# Patient Record
Sex: FEMALE | Race: WHITE | NOT HISPANIC OR LATINO
[De-identification: names, ages, dates, MRNs, and addresses within clinical notes are randomized per-mention and may not be internally consistent; named-entity substitution may affect disease eponyms.]

---

## 2017-09-12 ENCOUNTER — LABORATORY RESULT (OUTPATIENT)
Age: 82
End: 2017-09-12

## 2017-09-12 ENCOUNTER — NON-APPOINTMENT (OUTPATIENT)
Age: 82
End: 2017-09-12

## 2017-09-12 ENCOUNTER — APPOINTMENT (OUTPATIENT)
Dept: INTERNAL MEDICINE | Facility: CLINIC | Age: 82
End: 2017-09-12
Payer: MEDICARE

## 2017-09-12 VITALS — DIASTOLIC BLOOD PRESSURE: 66 MMHG | SYSTOLIC BLOOD PRESSURE: 156 MMHG

## 2017-09-12 VITALS — SYSTOLIC BLOOD PRESSURE: 142 MMHG | DIASTOLIC BLOOD PRESSURE: 72 MMHG

## 2017-09-12 DIAGNOSIS — Z00.00 ENCOUNTER FOR GENERAL ADULT MEDICAL EXAMINATION W/OUT ABNORMAL FINDINGS: ICD-10-CM

## 2017-09-12 DIAGNOSIS — F03.90 UNSPECIFIED DEMENTIA W/OUT BEHAVIORAL DISTURBANCE: ICD-10-CM

## 2017-09-12 DIAGNOSIS — Z82.0 FAMILY HISTORY OF EPILEPSY AND OTHER DISEASES OF THE NERVOUS SYSTEM: ICD-10-CM

## 2017-09-12 DIAGNOSIS — M48.06 SPINAL STENOSIS, LUMBAR REGION: ICD-10-CM

## 2017-09-12 DIAGNOSIS — Z82.49 FAMILY HISTORY OF ISCHEMIC HEART DISEASE AND OTHER DISEASES OF THE CIRCULATORY SYSTEM: ICD-10-CM

## 2017-09-12 DIAGNOSIS — I25.2 OLD MYOCARDIAL INFARCTION: ICD-10-CM

## 2017-09-12 DIAGNOSIS — Z87.898 PERSONAL HISTORY OF OTHER SPECIFIED CONDITIONS: ICD-10-CM

## 2017-09-12 LAB
BILIRUB UR QL STRIP: NORMAL
CLARITY UR: CLEAR
COLLECTION METHOD: NORMAL
GLUCOSE UR-MCNC: NORMAL
HCG UR QL: 0.2 EU/DL
HGB UR QL STRIP.AUTO: NORMAL
KETONES UR-MCNC: NORMAL
LEUKOCYTE ESTERASE UR QL STRIP: NORMAL
NITRITE UR QL STRIP: NORMAL
PH UR STRIP: 5.5
PROT UR STRIP-MCNC: NORMAL
SP GR UR STRIP: 1.01

## 2017-09-12 PROCEDURE — 81003 URINALYSIS AUTO W/O SCOPE: CPT | Mod: QW

## 2017-09-12 PROCEDURE — 90686 IIV4 VACC NO PRSV 0.5 ML IM: CPT

## 2017-09-12 PROCEDURE — 90670 PCV13 VACCINE IM: CPT | Mod: GA

## 2017-09-12 PROCEDURE — 36415 COLL VENOUS BLD VENIPUNCTURE: CPT

## 2017-09-12 PROCEDURE — G0009: CPT

## 2017-09-12 PROCEDURE — 93000 ELECTROCARDIOGRAM COMPLETE: CPT

## 2017-09-12 PROCEDURE — G0439: CPT

## 2017-09-12 PROCEDURE — G0438: CPT | Mod: GA

## 2017-09-12 PROCEDURE — 99214 OFFICE O/P EST MOD 30 MIN: CPT | Mod: 25

## 2017-09-12 PROCEDURE — G0008: CPT

## 2017-09-12 RX ORDER — MULTIVIT-MIN/FOLIC/VIT K/LYCOP 400-300MCG
25 MCG TABLET ORAL DAILY
Refills: 0 | Status: ACTIVE | COMMUNITY

## 2017-09-12 RX ORDER — INULIN 1.5 G
TABLET,CHEWABLE ORAL DAILY
Refills: 0 | Status: ACTIVE | COMMUNITY

## 2017-09-12 RX ORDER — ASPIRIN ENTERIC COATED TABLETS 81 MG 81 MG/1
81 TABLET, DELAYED RELEASE ORAL DAILY
Refills: 0 | Status: ACTIVE | COMMUNITY

## 2017-09-12 RX ORDER — FAMOTIDINE 10 MG/1
10 TABLET, FILM COATED ORAL DAILY
Refills: 0 | Status: DISCONTINUED | COMMUNITY
End: 2017-09-12

## 2017-09-12 RX ORDER — CETIRIZINE HYDROCHLORIDE 10 MG/1
10 CAPSULE, LIQUID FILLED ORAL DAILY
Refills: 0 | Status: ACTIVE | COMMUNITY

## 2017-09-12 RX ORDER — DOCUSATE SODIUM 100 MG/1
100 CAPSULE ORAL
Qty: 180 | Refills: 3 | Status: ACTIVE | COMMUNITY

## 2017-09-12 RX ORDER — ASCORBIC ACID 125 MG
TABLET,CHEWABLE ORAL DAILY
Refills: 0 | Status: ACTIVE | COMMUNITY

## 2017-09-13 LAB
25(OH)D3 SERPL-MCNC: 34.6 NG/ML
ALBUMIN SERPL ELPH-MCNC: 4.1 G/DL
ALP BLD-CCNC: 75 U/L
ALT SERPL-CCNC: 15 U/L
ANION GAP SERPL CALC-SCNC: 19 MMOL/L
AST SERPL-CCNC: 24 U/L
BASOPHILS # BLD AUTO: 0.02 K/UL
BASOPHILS NFR BLD AUTO: 0.2 %
BILIRUB SERPL-MCNC: 0.3 MG/DL
BUN SERPL-MCNC: 37 MG/DL
CALCIUM SERPL-MCNC: 11.4 MG/DL
CHLORIDE SERPL-SCNC: 99 MMOL/L
CHOLEST SERPL-MCNC: 172 MG/DL
CHOLEST/HDLC SERPL: 2.8 RATIO
CO2 SERPL-SCNC: 24 MMOL/L
CREAT SERPL-MCNC: 1.56 MG/DL
EOSINOPHIL # BLD AUTO: 0.27 K/UL
EOSINOPHIL NFR BLD AUTO: 3.1 %
GLUCOSE SERPL-MCNC: 85 MG/DL
HBA1C MFR BLD HPLC: 5.6 %
HCT VFR BLD CALC: 40 %
HDLC SERPL-MCNC: 62 MG/DL
HGB BLD-MCNC: 12.7 G/DL
IMM GRANULOCYTES NFR BLD AUTO: 0.1 %
LDLC SERPL CALC-MCNC: 72 MG/DL
LYMPHOCYTES # BLD AUTO: 1.65 K/UL
LYMPHOCYTES NFR BLD AUTO: 19.1 %
MAN DIFF?: NORMAL
MCHC RBC-ENTMCNC: 28.8 PG
MCHC RBC-ENTMCNC: 31.8 GM/DL
MCV RBC AUTO: 90.7 FL
MONOCYTES # BLD AUTO: 0.63 K/UL
MONOCYTES NFR BLD AUTO: 7.3 %
NEUTROPHILS # BLD AUTO: 6.05 K/UL
NEUTROPHILS NFR BLD AUTO: 70.2 %
PLATELET # BLD AUTO: 301 K/UL
POTASSIUM SERPL-SCNC: 4.9 MMOL/L
PROT SERPL-MCNC: 8 G/DL
RBC # BLD: 4.41 M/UL
RBC # FLD: 14.2 %
SAVE SPECIMEN: NORMAL
SODIUM SERPL-SCNC: 142 MMOL/L
T3RU NFR SERPL: 0.9 INDEX
TRIGL SERPL-MCNC: 190 MG/DL
TSH SERPL-ACNC: 3.83 UIU/ML
URATE SERPL-MCNC: 10.7 MG/DL
WBC # FLD AUTO: 8.63 K/UL

## 2017-09-19 DIAGNOSIS — Z63.4 DISAPPEARANCE AND DEATH OF FAMILY MEMBER: ICD-10-CM

## 2017-09-19 SDOH — SOCIAL STABILITY - SOCIAL INSECURITY: DISSAPEARANCE AND DEATH OF FAMILY MEMBER: Z63.4

## 2017-09-21 ENCOUNTER — RECORD ABSTRACTING (OUTPATIENT)
Age: 82
End: 2017-09-21

## 2017-09-25 ENCOUNTER — RECORD ABSTRACTING (OUTPATIENT)
Age: 82
End: 2017-09-25

## 2017-10-11 ENCOUNTER — MEDICATION RENEWAL (OUTPATIENT)
Age: 82
End: 2017-10-11

## 2017-10-12 ENCOUNTER — OTHER (OUTPATIENT)
Age: 82
End: 2017-10-12

## 2017-11-03 ENCOUNTER — MEDICATION RENEWAL (OUTPATIENT)
Age: 82
End: 2017-11-03

## 2017-12-06 ENCOUNTER — APPOINTMENT (OUTPATIENT)
Dept: INTERNAL MEDICINE | Facility: CLINIC | Age: 82
End: 2017-12-06
Payer: MEDICARE

## 2017-12-06 VITALS — SYSTOLIC BLOOD PRESSURE: 122 MMHG | DIASTOLIC BLOOD PRESSURE: 72 MMHG

## 2017-12-06 VITALS — DIASTOLIC BLOOD PRESSURE: 70 MMHG | SYSTOLIC BLOOD PRESSURE: 120 MMHG

## 2017-12-06 VITALS — HEIGHT: 62 IN | WEIGHT: 143 LBS | BODY MASS INDEX: 26.31 KG/M2

## 2017-12-06 PROCEDURE — 99214 OFFICE O/P EST MOD 30 MIN: CPT

## 2017-12-06 RX ORDER — IPRATROPIUM BROMIDE 0.5 MG/2.5ML
0.02 SOLUTION RESPIRATORY (INHALATION)
Refills: 0 | Status: ACTIVE | COMMUNITY

## 2017-12-06 RX ORDER — FLUOCINOLONE ACETONIDE 0.11 MG/ML
0.01 OIL AURICULAR (OTIC)
Refills: 0 | Status: ACTIVE | COMMUNITY

## 2017-12-21 ENCOUNTER — APPOINTMENT (OUTPATIENT)
Dept: INTERNAL MEDICINE | Facility: CLINIC | Age: 82
End: 2017-12-21

## 2018-01-11 ENCOUNTER — APPOINTMENT (OUTPATIENT)
Dept: INTERNAL MEDICINE | Facility: CLINIC | Age: 83
End: 2018-01-11

## 2018-01-29 ENCOUNTER — CHART COPY (OUTPATIENT)
Age: 83
End: 2018-01-29

## 2018-02-01 ENCOUNTER — APPOINTMENT (OUTPATIENT)
Dept: INTERNAL MEDICINE | Facility: CLINIC | Age: 83
End: 2018-02-01
Payer: MEDICARE

## 2018-02-01 DIAGNOSIS — Z00.00 ENCOUNTER FOR GENERAL ADULT MEDICAL EXAMINATION W/OUT ABNORMAL FINDINGS: ICD-10-CM

## 2018-02-01 PROCEDURE — 77080 DXA BONE DENSITY AXIAL: CPT

## 2018-04-10 ENCOUNTER — MEDICATION RENEWAL (OUTPATIENT)
Age: 83
End: 2018-04-10

## 2018-04-25 ENCOUNTER — MEDICATION RENEWAL (OUTPATIENT)
Age: 83
End: 2018-04-25

## 2018-05-22 ENCOUNTER — OTHER (OUTPATIENT)
Age: 83
End: 2018-05-22

## 2018-05-23 ENCOUNTER — RX RENEWAL (OUTPATIENT)
Age: 83
End: 2018-05-23

## 2018-05-30 ENCOUNTER — LABORATORY RESULT (OUTPATIENT)
Age: 83
End: 2018-05-30

## 2018-05-30 ENCOUNTER — APPOINTMENT (OUTPATIENT)
Dept: INTERNAL MEDICINE | Facility: CLINIC | Age: 83
End: 2018-05-30
Payer: MEDICARE

## 2018-05-30 VITALS — DIASTOLIC BLOOD PRESSURE: 72 MMHG | SYSTOLIC BLOOD PRESSURE: 112 MMHG

## 2018-05-30 VITALS — DIASTOLIC BLOOD PRESSURE: 72 MMHG | SYSTOLIC BLOOD PRESSURE: 106 MMHG

## 2018-05-30 DIAGNOSIS — E21.3 HYPERPARATHYROIDISM, UNSPECIFIED: ICD-10-CM

## 2018-05-30 PROCEDURE — 36415 COLL VENOUS BLD VENIPUNCTURE: CPT

## 2018-05-30 PROCEDURE — 99214 OFFICE O/P EST MOD 30 MIN: CPT | Mod: 25

## 2018-05-30 NOTE — PHYSICAL EXAM
[No Acute Distress] : no acute distress [Well Nourished] : well nourished [No Respiratory Distress] : no respiratory distress  [Clear to Auscultation] : lungs were clear to auscultation bilaterally [No Accessory Muscle Use] : no accessory muscle use [Regular Rhythm] : with a regular rhythm [Normal S1, S2] : normal S1 and S2 [No Murmur] : no murmur heard [No Edema] : there was no peripheral edema [No Joint Swelling] : no joint swelling [Grossly Normal Strength/Tone] : grossly normal strength/tone [No Focal Deficits] : no focal deficits [de-identified] : ELINA

## 2018-05-30 NOTE — ASSESSMENT
[FreeTextEntry1] : This is a 89-year-old female whose history has been reviewed above\par \par She has a history of ASHD she has little to no activity she has no chest pain or shortness of breath that can be ascertained her lungs are clear heart sounds are unchanged. She is on appropriate medication no intervention\par \par She has a history of dementia she remains on Aricept she does seem a bit more alert which I do believe it is from her hearing aids. No further intervention\par \par She is just hypertension blood pressure is under good control there is no orthostasis no medication changes\par \par She has a history of hyperparathyroidism she has been seen by endocrinology who declines intervention at this time a serum calcium has been drawn\par \par She does have some low back pain and this disease we will prescribe physical therapy

## 2018-05-30 NOTE — HISTORY OF PRESENT ILLNESS
[FreeTextEntry1] : This is a 89-year-old female for evaluation of treatment for hyperparathyroidism hypertension hypercholesterolemia ASHD mild dementia and spinal stenosis [de-identified] : Patient is accompanied by her daughter.\par \par She has received hearing aids and is much more attentive.\par \par She is having back pain which is not terribly debilitating. She continues to be relatively weak\par \par Her daughter states that she is incontinent but totally unaware\par \par The patient offers no complaints on her own

## 2018-05-31 LAB
25(OH)D3 SERPL-MCNC: 40.2 NG/ML
ALBUMIN SERPL ELPH-MCNC: 4.2 G/DL
ALP BLD-CCNC: 72 U/L
ALT SERPL-CCNC: 13 U/L
ANION GAP SERPL CALC-SCNC: 15 MMOL/L
AST SERPL-CCNC: 21 U/L
BASOPHILS # BLD AUTO: 0.02 K/UL
BASOPHILS NFR BLD AUTO: 0.2 %
BILIRUB SERPL-MCNC: 0.4 MG/DL
BUN SERPL-MCNC: 40 MG/DL
CALCIUM SERPL-MCNC: 11 MG/DL
CHLORIDE SERPL-SCNC: 105 MMOL/L
CHOLEST SERPL-MCNC: 172 MG/DL
CHOLEST/HDLC SERPL: 3.2 RATIO
CO2 SERPL-SCNC: 23 MMOL/L
CREAT SERPL-MCNC: 1.53 MG/DL
EOSINOPHIL # BLD AUTO: 0.2 K/UL
EOSINOPHIL NFR BLD AUTO: 2.4 %
GLUCOSE SERPL-MCNC: 96 MG/DL
HBA1C MFR BLD HPLC: 5.7 %
HCT VFR BLD CALC: 38.9 %
HDLC SERPL-MCNC: 54 MG/DL
HGB BLD-MCNC: 12 G/DL
IMM GRANULOCYTES NFR BLD AUTO: 0.1 %
LDLC SERPL CALC-MCNC: 84 MG/DL
LYMPHOCYTES # BLD AUTO: 1.25 K/UL
LYMPHOCYTES NFR BLD AUTO: 14.9 %
MAN DIFF?: NORMAL
MCHC RBC-ENTMCNC: 28.4 PG
MCHC RBC-ENTMCNC: 30.8 GM/DL
MCV RBC AUTO: 92.2 FL
MONOCYTES # BLD AUTO: 0.62 K/UL
MONOCYTES NFR BLD AUTO: 7.4 %
NEUTROPHILS # BLD AUTO: 6.31 K/UL
NEUTROPHILS NFR BLD AUTO: 75 %
PLATELET # BLD AUTO: 284 K/UL
POTASSIUM SERPL-SCNC: 4.2 MMOL/L
PROT SERPL-MCNC: 7.7 G/DL
RBC # BLD: 4.22 M/UL
RBC # FLD: 15.6 %
SAVE SPECIMEN: NORMAL
SODIUM SERPL-SCNC: 143 MMOL/L
T3RU NFR SERPL: 0.89 INDEX
T4 SERPL-MCNC: 6.1 UG/DL
TRIGL SERPL-MCNC: 171 MG/DL
TSH SERPL-ACNC: 4.14 UIU/ML
URATE SERPL-MCNC: 8.9 MG/DL
WBC # FLD AUTO: 8.41 K/UL

## 2018-06-08 ENCOUNTER — RX RENEWAL (OUTPATIENT)
Age: 83
End: 2018-06-08

## 2018-06-26 ENCOUNTER — MEDICATION RENEWAL (OUTPATIENT)
Age: 83
End: 2018-06-26

## 2018-07-12 ENCOUNTER — OTHER (OUTPATIENT)
Age: 83
End: 2018-07-12

## 2018-09-10 ENCOUNTER — RX RENEWAL (OUTPATIENT)
Age: 83
End: 2018-09-10

## 2018-09-12 ENCOUNTER — MEDICATION RENEWAL (OUTPATIENT)
Age: 83
End: 2018-09-12

## 2018-09-12 DIAGNOSIS — M54.9 DORSALGIA, UNSPECIFIED: ICD-10-CM

## 2018-09-19 ENCOUNTER — MESSAGE (OUTPATIENT)
Age: 83
End: 2018-09-19

## 2018-09-27 ENCOUNTER — OTHER (OUTPATIENT)
Age: 83
End: 2018-09-27

## 2018-09-27 ENCOUNTER — MEDICATION RENEWAL (OUTPATIENT)
Age: 83
End: 2018-09-27

## 2018-09-28 ENCOUNTER — OTHER (OUTPATIENT)
Age: 83
End: 2018-09-28

## 2018-11-01 ENCOUNTER — APPOINTMENT (OUTPATIENT)
Dept: INTERNAL MEDICINE | Facility: CLINIC | Age: 83
End: 2018-11-01
Payer: MEDICARE

## 2018-11-01 ENCOUNTER — LABORATORY RESULT (OUTPATIENT)
Age: 83
End: 2018-11-01

## 2018-11-01 VITALS
WEIGHT: 143 LBS | BODY MASS INDEX: 26.31 KG/M2 | SYSTOLIC BLOOD PRESSURE: 116 MMHG | DIASTOLIC BLOOD PRESSURE: 58 MMHG | HEIGHT: 62 IN | HEART RATE: 52 BPM

## 2018-11-01 DIAGNOSIS — L89.90 PRESSURE ULCER OF UNSPECIFIED SITE, UNSPECIFIED STAGE: ICD-10-CM

## 2018-11-01 PROCEDURE — 36415 COLL VENOUS BLD VENIPUNCTURE: CPT | Mod: PD

## 2018-11-01 PROCEDURE — 99213 OFFICE O/P EST LOW 20 MIN: CPT | Mod: 25,PD

## 2018-11-01 NOTE — PHYSICAL EXAM
[No Acute Distress] : no acute distress [Well Nourished] : well nourished [Well Developed] : well developed [Well-Appearing] : well-appearing [No Respiratory Distress] : no respiratory distress  [Clear to Auscultation] : lungs were clear to auscultation bilaterally [No Accessory Muscle Use] : no accessory muscle use [Normal Rate] : normal rate  [Normal S1, S2] : normal S1 and S2 [No Edema] : there was no peripheral edema [Soft] : abdomen soft [Non Tender] : non-tender [Non-distended] : non-distended [No Masses] : no abdominal mass palpated [No HSM] : no HSM [Normal Bowel Sounds] : normal bowel sounds [Normal Affect] : the affect was normal [Normal Insight/Judgement] : insight and judgment were intact [de-identified] : 3 wounds--one stage 1 on her head, ulcer on heel 3cm by 3 cm, one on sacrum--large, multiple stages

## 2018-11-01 NOTE — HISTORY OF PRESENT ILLNESS
[FreeTextEntry8] : 89 year old female here today with complaints of pain in her left hip. \par Shes having hip pain and having issues walking. She has a sore on her buttocks and heal also. \par She has been in pain for 4/5 days.  She has not taken anything for the pain, tylenol doesn’t work for the pain. \par She hasn’t seen Ortho. \par She was sitting down a lot which made the pain worse. \par She has an aide who helps her out. \par \par

## 2018-11-01 NOTE — ASSESSMENT
[FreeTextEntry1] : Pain: fu with Ortho\par prednisone 20mg for three days \par \par Pressure Ulcers: rotate every 2 hours at least,\par will put in woundcare/homecare referral asap \par \par routine labs

## 2018-11-02 ENCOUNTER — INPATIENT (INPATIENT)
Facility: HOSPITAL | Age: 83
LOS: 5 days | Discharge: HOME | DRG: 603 | End: 2018-11-08
Attending: HOSPITALIST | Admitting: HOSPITALIST
Payer: MEDICARE

## 2018-11-02 ENCOUNTER — OTHER (OUTPATIENT)
Age: 83
End: 2018-11-02

## 2018-11-02 VITALS
SYSTOLIC BLOOD PRESSURE: 150 MMHG | RESPIRATION RATE: 16 BRPM | OXYGEN SATURATION: 100 % | HEART RATE: 60 BPM | DIASTOLIC BLOOD PRESSURE: 76 MMHG

## 2018-11-02 DIAGNOSIS — F03.90 UNSPECIFIED DEMENTIA WITHOUT BEHAVIORAL DISTURBANCE: ICD-10-CM

## 2018-11-02 DIAGNOSIS — R22.9 LOCALIZED SWELLING, MASS AND LUMP, UNSPECIFIED: ICD-10-CM

## 2018-11-02 DIAGNOSIS — Z29.9 ENCOUNTER FOR PROPHYLACTIC MEASURES, UNSPECIFIED: ICD-10-CM

## 2018-11-02 DIAGNOSIS — M48.00 SPINAL STENOSIS, SITE UNSPECIFIED: ICD-10-CM

## 2018-11-02 DIAGNOSIS — E78.5 HYPERLIPIDEMIA, UNSPECIFIED: ICD-10-CM

## 2018-11-02 DIAGNOSIS — Z96.642 PRESENCE OF LEFT ARTIFICIAL HIP JOINT: Chronic | ICD-10-CM

## 2018-11-02 DIAGNOSIS — L03.317 CELLULITIS OF BUTTOCK: ICD-10-CM

## 2018-11-02 DIAGNOSIS — M86.9 OSTEOMYELITIS, UNSPECIFIED: ICD-10-CM

## 2018-11-02 DIAGNOSIS — R91.8 OTHER NONSPECIFIC ABNORMAL FINDING OF LUNG FIELD: ICD-10-CM

## 2018-11-02 DIAGNOSIS — I10 ESSENTIAL (PRIMARY) HYPERTENSION: ICD-10-CM

## 2018-11-02 DIAGNOSIS — N18.3 CHRONIC KIDNEY DISEASE, STAGE 3 (MODERATE): ICD-10-CM

## 2018-11-02 LAB
ALBUMIN SERPL ELPH-MCNC: 3.2 G/DL — LOW (ref 3.3–5)
ALBUMIN SERPL ELPH-MCNC: 3.7 G/DL
ALP BLD-CCNC: 145 U/L
ALP SERPL-CCNC: 130 U/L — HIGH (ref 40–120)
ALT FLD-CCNC: 41 U/L — SIGNIFICANT CHANGE UP (ref 10–45)
ALT SERPL-CCNC: 45 U/L
ANION GAP SERPL CALC-SCNC: 11 MMOL/L — SIGNIFICANT CHANGE UP (ref 5–17)
ANION GAP SERPL CALC-SCNC: 15 MMOL/L
APTT BLD: 25.9 SEC — LOW (ref 27.5–36.3)
AST SERPL-CCNC: 29 U/L — SIGNIFICANT CHANGE UP (ref 10–40)
AST SERPL-CCNC: 36 U/L
BASE EXCESS BLDV CALC-SCNC: 1 MMOL/L — SIGNIFICANT CHANGE UP (ref -2–2)
BASE EXCESS BLDV CALC-SCNC: 2.8 MMOL/L — HIGH (ref -2–2)
BASOPHILS # BLD AUTO: 0 K/UL
BASOPHILS # BLD AUTO: 0 K/UL — SIGNIFICANT CHANGE UP (ref 0–0.2)
BASOPHILS NFR BLD AUTO: 0 %
BASOPHILS NFR BLD AUTO: 0.1 % — SIGNIFICANT CHANGE UP (ref 0–2)
BILIRUB SERPL-MCNC: 0.2 MG/DL
BILIRUB SERPL-MCNC: 0.3 MG/DL — SIGNIFICANT CHANGE UP (ref 0.2–1.2)
BUN SERPL-MCNC: 48 MG/DL — HIGH (ref 7–23)
BUN SERPL-MCNC: 49 MG/DL
CA-I SERPL-SCNC: 1.42 MMOL/L — HIGH (ref 1.12–1.3)
CA-I SERPL-SCNC: 1.5 MMOL/L — HIGH (ref 1.12–1.3)
CALCIUM SERPL-MCNC: 11.7 MG/DL — HIGH (ref 8.4–10.5)
CALCIUM SERPL-MCNC: 11.8 MG/DL
CHLORIDE BLDV-SCNC: 104 MMOL/L — SIGNIFICANT CHANGE UP (ref 96–108)
CHLORIDE BLDV-SCNC: 105 MMOL/L — SIGNIFICANT CHANGE UP (ref 96–108)
CHLORIDE SERPL-SCNC: 102 MMOL/L — SIGNIFICANT CHANGE UP (ref 96–108)
CHLORIDE SERPL-SCNC: 103 MMOL/L
CHOLEST SERPL-MCNC: 137 MG/DL
CHOLEST/HDLC SERPL: 5.7 RATIO
CO2 BLDV-SCNC: 28 MMOL/L — SIGNIFICANT CHANGE UP (ref 22–30)
CO2 BLDV-SCNC: 30 MMOL/L — SIGNIFICANT CHANGE UP (ref 22–30)
CO2 SERPL-SCNC: 25 MMOL/L
CO2 SERPL-SCNC: 28 MMOL/L — SIGNIFICANT CHANGE UP (ref 22–31)
CREAT SERPL-MCNC: 1.37 MG/DL
CREAT SERPL-MCNC: 1.4 MG/DL — HIGH (ref 0.5–1.3)
CRP SERPL-MCNC: 6.14 MG/DL — HIGH (ref 0–0.4)
EOSINOPHIL # BLD AUTO: 0 K/UL
EOSINOPHIL # BLD AUTO: 0.1 K/UL — SIGNIFICANT CHANGE UP (ref 0–0.5)
EOSINOPHIL NFR BLD AUTO: 0 %
EOSINOPHIL NFR BLD AUTO: 0.4 % — SIGNIFICANT CHANGE UP (ref 0–6)
ERYTHROCYTE [SEDIMENTATION RATE] IN BLOOD: 95 MM/HR — HIGH (ref 0–20)
GAS PNL BLDV: 139 MMOL/L — SIGNIFICANT CHANGE UP (ref 136–145)
GAS PNL BLDV: 139 MMOL/L — SIGNIFICANT CHANGE UP (ref 136–145)
GAS PNL BLDV: SIGNIFICANT CHANGE UP
GLUCOSE BLDV-MCNC: 115 MG/DL — HIGH (ref 70–99)
GLUCOSE BLDV-MCNC: 237 MG/DL — HIGH (ref 70–99)
GLUCOSE SERPL-MCNC: 117 MG/DL — HIGH (ref 70–99)
HCO3 BLDV-SCNC: 27 MMOL/L — SIGNIFICANT CHANGE UP (ref 21–29)
HCO3 BLDV-SCNC: 29 MMOL/L — SIGNIFICANT CHANGE UP (ref 21–29)
HCT VFR BLD CALC: 36.8 % — SIGNIFICANT CHANGE UP (ref 34.5–45)
HCT VFR BLD CALC: 36.9 %
HCT VFR BLDA CALC: 34 % — LOW (ref 39–50)
HCT VFR BLDA CALC: 36 % — LOW (ref 39–50)
HDLC SERPL-MCNC: 24 MG/DL
HGB BLD CALC-MCNC: 11.1 G/DL — LOW (ref 11.5–15.5)
HGB BLD CALC-MCNC: 11.6 G/DL — SIGNIFICANT CHANGE UP (ref 11.5–15.5)
HGB BLD-MCNC: 11.7 G/DL — SIGNIFICANT CHANGE UP (ref 11.5–15.5)
HGB BLD-MCNC: 11.9 G/DL
INR BLD: 1.05 RATIO — SIGNIFICANT CHANGE UP (ref 0.88–1.16)
LACTATE BLDV-MCNC: 1.8 MMOL/L — SIGNIFICANT CHANGE UP (ref 0.7–2)
LACTATE BLDV-MCNC: 2.5 MMOL/L — HIGH (ref 0.7–2)
LDLC SERPL CALC-MCNC: 70 MG/DL
LYMPHOCYTES # BLD AUTO: 0.98 K/UL
LYMPHOCYTES # BLD AUTO: 1.3 K/UL — SIGNIFICANT CHANGE UP (ref 1–3.3)
LYMPHOCYTES # BLD AUTO: 6.4 % — LOW (ref 13–44)
LYMPHOCYTES NFR BLD AUTO: 4.3 %
MAN DIFF?: NORMAL
MCHC RBC-ENTMCNC: 28.3 PG — SIGNIFICANT CHANGE UP (ref 27–34)
MCHC RBC-ENTMCNC: 29.3 PG
MCHC RBC-ENTMCNC: 31.7 GM/DL — LOW (ref 32–36)
MCHC RBC-ENTMCNC: 32.2 GM/DL
MCV RBC AUTO: 89.2 FL — SIGNIFICANT CHANGE UP (ref 80–100)
MCV RBC AUTO: 90.9 FL
MONOCYTES # BLD AUTO: 0.77 K/UL
MONOCYTES # BLD AUTO: 1.2 K/UL — HIGH (ref 0–0.9)
MONOCYTES NFR BLD AUTO: 3.4 %
MONOCYTES NFR BLD AUTO: 5.9 % — SIGNIFICANT CHANGE UP (ref 2–14)
NEUTROPHILS # BLD AUTO: 17.5 K/UL — HIGH (ref 1.8–7.4)
NEUTROPHILS # BLD AUTO: 20.97 K/UL
NEUTROPHILS NFR BLD AUTO: 87.2 % — HIGH (ref 43–77)
NEUTROPHILS NFR BLD AUTO: 92.3 %
OTHER CELLS CSF MANUAL: 4 ML/DL — LOW (ref 18–22)
OTHER CELLS CSF MANUAL: 5 ML/DL — LOW (ref 18–22)
PCO2 BLDV: 50 MMHG — SIGNIFICANT CHANGE UP (ref 35–50)
PCO2 BLDV: 54 MMHG — HIGH (ref 35–50)
PH BLDV: 7.35 — SIGNIFICANT CHANGE UP (ref 7.35–7.45)
PH BLDV: 7.35 — SIGNIFICANT CHANGE UP (ref 7.35–7.45)
PLAT MORPH BLD: NORMAL — SIGNIFICANT CHANGE UP
PLATELET # BLD AUTO: 459 K/UL
PLATELET # BLD AUTO: 460 K/UL — HIGH (ref 150–400)
PO2 BLDV: 23 MMHG — SIGNIFICANT CHANGE UP (ref 25–45)
PO2 BLDV: 23 MMHG — SIGNIFICANT CHANGE UP (ref 25–45)
POTASSIUM BLDV-SCNC: 3.8 MMOL/L — SIGNIFICANT CHANGE UP (ref 3.5–5.3)
POTASSIUM BLDV-SCNC: 4.7 MMOL/L — SIGNIFICANT CHANGE UP (ref 3.5–5.3)
POTASSIUM SERPL-MCNC: 4.7 MMOL/L — SIGNIFICANT CHANGE UP (ref 3.5–5.3)
POTASSIUM SERPL-SCNC: 4.7 MMOL/L — SIGNIFICANT CHANGE UP (ref 3.5–5.3)
POTASSIUM SERPL-SCNC: 5.3 MMOL/L
PROT SERPL-MCNC: 7.5 G/DL — SIGNIFICANT CHANGE UP (ref 6–8.3)
PROT SERPL-MCNC: 8.2 G/DL
PROTHROM AB SERPL-ACNC: 12.1 SEC — SIGNIFICANT CHANGE UP (ref 10–12.9)
RAPID RVP RESULT: SIGNIFICANT CHANGE UP
RBC # BLD: 4.06 M/UL
RBC # BLD: 4.12 M/UL — SIGNIFICANT CHANGE UP (ref 3.8–5.2)
RBC # FLD: 14.4 % — SIGNIFICANT CHANGE UP (ref 10.3–14.5)
RBC # FLD: 16.2 %
RBC BLD AUTO: SIGNIFICANT CHANGE UP
SAO2 % BLDV: 30 % — LOW (ref 67–88)
SAO2 % BLDV: 31 % — LOW (ref 67–88)
SODIUM SERPL-SCNC: 141 MMOL/L — SIGNIFICANT CHANGE UP (ref 135–145)
SODIUM SERPL-SCNC: 143 MMOL/L
T4 SERPL-MCNC: 4.6 UG/DL
TRIGL SERPL-MCNC: 213 MG/DL
TSH SERPL-ACNC: 1.37 UIU/ML
WBC # BLD: 20.1 K/UL — HIGH (ref 3.8–10.5)
WBC # FLD AUTO: 20.1 K/UL — HIGH (ref 3.8–10.5)
WBC # FLD AUTO: 22.72 K/UL

## 2018-11-02 PROCEDURE — 73552 X-RAY EXAM OF FEMUR 2/>: CPT | Mod: 26,LT

## 2018-11-02 PROCEDURE — 99285 EMERGENCY DEPT VISIT HI MDM: CPT

## 2018-11-02 PROCEDURE — 99223 1ST HOSP IP/OBS HIGH 75: CPT | Mod: GC

## 2018-11-02 PROCEDURE — 71045 X-RAY EXAM CHEST 1 VIEW: CPT | Mod: 26

## 2018-11-02 PROCEDURE — 71250 CT THORAX DX C-: CPT | Mod: 26

## 2018-11-02 PROCEDURE — 74176 CT ABD & PELVIS W/O CONTRAST: CPT | Mod: 26

## 2018-11-02 PROCEDURE — 73502 X-RAY EXAM HIP UNI 2-3 VIEWS: CPT | Mod: 26,LT

## 2018-11-02 RX ORDER — AZITHROMYCIN 500 MG/1
500 TABLET, FILM COATED ORAL ONCE
Qty: 0 | Refills: 0 | Status: DISCONTINUED | OUTPATIENT
Start: 2018-11-02 | End: 2018-11-02

## 2018-11-02 RX ORDER — VANCOMYCIN HCL 1 G
1000 VIAL (EA) INTRAVENOUS ONCE
Qty: 0 | Refills: 0 | Status: COMPLETED | OUTPATIENT
Start: 2018-11-02 | End: 2018-11-02

## 2018-11-02 RX ORDER — HEPARIN SODIUM 5000 [USP'U]/ML
5000 INJECTION INTRAVENOUS; SUBCUTANEOUS EVERY 8 HOURS
Qty: 0 | Refills: 0 | Status: DISCONTINUED | OUTPATIENT
Start: 2018-11-02 | End: 2018-11-02

## 2018-11-02 RX ORDER — AZTREONAM 2 G
1000 VIAL (EA) INJECTION ONCE
Qty: 0 | Refills: 0 | Status: COMPLETED | OUTPATIENT
Start: 2018-11-02 | End: 2018-11-02

## 2018-11-02 RX ORDER — AMLODIPINE BESYLATE 2.5 MG/1
2.5 TABLET ORAL DAILY
Qty: 0 | Refills: 0 | Status: DISCONTINUED | OUTPATIENT
Start: 2018-11-02 | End: 2018-11-08

## 2018-11-02 RX ORDER — SODIUM CHLORIDE 9 MG/ML
1000 INJECTION INTRAMUSCULAR; INTRAVENOUS; SUBCUTANEOUS ONCE
Qty: 0 | Refills: 0 | Status: COMPLETED | OUTPATIENT
Start: 2018-11-02 | End: 2018-11-02

## 2018-11-02 RX ORDER — CARVEDILOL PHOSPHATE 80 MG/1
6.25 CAPSULE, EXTENDED RELEASE ORAL EVERY 12 HOURS
Qty: 0 | Refills: 0 | Status: DISCONTINUED | OUTPATIENT
Start: 2018-11-02 | End: 2018-11-08

## 2018-11-02 RX ORDER — ATORVASTATIN CALCIUM 80 MG/1
40 TABLET, FILM COATED ORAL AT BEDTIME
Qty: 0 | Refills: 0 | Status: DISCONTINUED | OUTPATIENT
Start: 2018-11-02 | End: 2018-11-08

## 2018-11-02 RX ORDER — CIPROFLOXACIN LACTATE 400MG/40ML
400 VIAL (ML) INTRAVENOUS EVERY 12 HOURS
Qty: 0 | Refills: 0 | Status: DISCONTINUED | OUTPATIENT
Start: 2018-11-02 | End: 2018-11-02

## 2018-11-02 RX ORDER — SODIUM CHLORIDE 9 MG/ML
500 INJECTION INTRAMUSCULAR; INTRAVENOUS; SUBCUTANEOUS ONCE
Qty: 0 | Refills: 0 | Status: COMPLETED | OUTPATIENT
Start: 2018-11-02 | End: 2018-11-02

## 2018-11-02 RX ORDER — GABAPENTIN 400 MG/1
100 CAPSULE ORAL THREE TIMES A DAY
Qty: 0 | Refills: 0 | Status: DISCONTINUED | OUTPATIENT
Start: 2018-11-02 | End: 2018-11-08

## 2018-11-02 RX ADMIN — Medication 200 MILLIGRAM(S): at 20:30

## 2018-11-02 RX ADMIN — SODIUM CHLORIDE 1000 MILLILITER(S): 9 INJECTION INTRAMUSCULAR; INTRAVENOUS; SUBCUTANEOUS at 15:41

## 2018-11-02 RX ADMIN — SODIUM CHLORIDE 1000 MILLILITER(S): 9 INJECTION INTRAMUSCULAR; INTRAVENOUS; SUBCUTANEOUS at 13:55

## 2018-11-02 RX ADMIN — ATORVASTATIN CALCIUM 40 MILLIGRAM(S): 80 TABLET, FILM COATED ORAL at 23:31

## 2018-11-02 RX ADMIN — SODIUM CHLORIDE 500 MILLILITER(S): 9 INJECTION INTRAMUSCULAR; INTRAVENOUS; SUBCUTANEOUS at 19:15

## 2018-11-02 RX ADMIN — Medication 50 MILLIGRAM(S): at 15:42

## 2018-11-02 RX ADMIN — GABAPENTIN 100 MILLIGRAM(S): 400 CAPSULE ORAL at 23:31

## 2018-11-02 RX ADMIN — Medication 250 MILLIGRAM(S): at 16:13

## 2018-11-02 NOTE — H&P ADULT - NSHPLABSRESULTS_GEN_ALL_CORE
11.7   20.1  )-----------( 460      ( 02 Nov 2018 14:23 )             36.8       11-02    141  |  102  |  48<H>  ----------------------------<  117<H>  4.7   |  28  |  1.40<H>    Ca    11.7<H>      02 Nov 2018 14:23    TPro  7.5  /  Alb  3.2<L>  /  TBili  0.3  /  DBili  x   /  AST  29  /  ALT  41  /  AlkPhos  130<H>  11-02                  PT/INR - ( 02 Nov 2018 14:23 )   PT: 12.1 sec;   INR: 1.05 ratio         PTT - ( 02 Nov 2018 14:23 )  PTT:25.9 sec    < from: Xray Chest 1 View AP/PA (11.02.18 @ 16:29) >    PROCEDURE DATE:  11/02/2018      ******PRELIMINARY REPORT******    ******PRELIMINARY REPORT******              INTERPRETATION:  Right upper lobe opacity has somewhat defined borders,   pneumonia is considered however chest CT is recommended to exclude   underlying mass.    Follow up official report.              ******PRELIMINARY REPORT******    ******PRELIMINARY REPORT******          < end of copied text >    < from: CT Chest No Cont (11.02.18 @ 19:47) >      IMPRESSION: A mass in the peripheral right upper lobe is consistent with   primary lung cancer.      < end of copied text > Personally reviewed labs, imaging.    11.7   20.1  )-----------( 460      ( 02 Nov 2018 14:23 )             36.8       11-02    141  |  102  |  48<H>  ----------------------------<  117<H>  4.7   |  28  |  1.40<H>    Ca    11.7<H>      02 Nov 2018 14:23    TPro  7.5  /  Alb  3.2<L>  /  TBili  0.3  /  DBili  x   /  AST  29  /  ALT  41  /  AlkPhos  130<H>  11-02                  PT/INR - ( 02 Nov 2018 14:23 )   PT: 12.1 sec;   INR: 1.05 ratio         PTT - ( 02 Nov 2018 14:23 )  PTT:25.9 sec    < from: Xray Chest 1 View AP/PA (11.02.18 @ 16:29) >    PROCEDURE DATE:  11/02/2018      ******PRELIMINARY REPORT******    ******PRELIMINARY REPORT******              INTERPRETATION:  Right upper lobe opacity has somewhat defined borders,   pneumonia is considered however chest CT is recommended to exclude   underlying mass.    Follow up official report.              ******PRELIMINARY REPORT******    ******PRELIMINARY REPORT******          < end of copied text >    < from: CT Chest No Cont (11.02.18 @ 19:47) >      IMPRESSION: A mass in the peripheral right upper lobe is consistent with   primary lung cancer.      < end of copied text >

## 2018-11-02 NOTE — H&P ADULT - PROBLEM SELECTOR PLAN 5
Pt reportedly takes carvedilol, amlodipine, and torsemide as outpatient per Allscripts review, however unable to reach daughter or pharmacy (closed) for verification  - c/w home amlodipine  - holding carvedilol and torsemide in setting of active infection, add back as clinically indicated Pain currently well controlled, has not needed pain medication since presentation  - pain management with tylenol for now Creatinine on admission 1.40, appears at baseline (Cr ~1.5 from as far back as 2017 per Allscripts)  - renally dose all meds, avoid nephrotoxins  - BMP QD

## 2018-11-02 NOTE — CHART NOTE - NSCHARTNOTEFT_GEN_A_CORE
TO BE COMPLETED WITHIN 6 HOURS OF INITIAL ASSESSMENT:    For use in patients that have 2 sepsis criteria and new organ dysfunction   •	Lactate >2    If patient persistent hypotension (SBP<90) or any lactate >4 then provider evaluation (Physician/PA/NP) within 30 minutes of bolus completion is required.    Vital Signs Last 24 Hrs  T(C): 37.1 (02 Nov 2018 17:30), Max: 37.1 (02 Nov 2018 17:30)  T(F): 98.7 (02 Nov 2018 17:30), Max: 98.7 (02 Nov 2018 17:30)  HR: 60 (02 Nov 2018 13:08) (60 - 60)  BP: 150/76 (02 Nov 2018 13:08) (150/76 - 150/76)  BP(mean): --  RR: 16 (02 Nov 2018 13:08) (16 - 16)  SpO2: 100% (02 Nov 2018 13:08) (100% - 100%)  		  LUNGS:  [ x ]Clear bilaterally [  ] Wheeze [  ] Rhonchi [  ] Rales [  ] Crackles; Other:  HEART: [ x ]RRR [  ] No murmur [ x ]  Normal S1S2 [  ] Tachycardia;  Other:  CAPILLARY REFULL:  	Fingers: [ x ] less than 2 seconds [  ] more than 2 seconds                                           Toes: [ x ]  less than 2 seconds [  ] more than 2 seconds   PERIPHERAL PULSES:  Radial: [ x ] Palpable  [  ]  non-palpable                                         Dorsalis Pedis: [ x ] Palpable  [  ] non-palpable                                         Posterior Tibial: [ x ] Palpable  [  ] non-palpable                                          Other:  SKIN:   [  ]  Diaphoretic  [  ]  mottling  [  ]  Cold extremities  [ x ]  Warm [ x ]  Dry                      Other:    Labs:  02 Nov 2018 14:23    141    |  102    |  48     ----------------------------<  117    4.7     |  28     |  1.40     Ca    11.7       02 Nov 2018 14:23    TPro  7.5    /  Alb  3.2    /  TBili  0.3    /  DBili  x      /  AST  29     /  ALT  41     /  AlkPhos  130    02 Nov 2018 14:23                          11.7   20.1  )-----------( 460      ( 02 Nov 2018 14:23 )             36.8     PT/INR - ( 02 Nov 2018 14:23 )   PT: 12.1 sec;   INR: 1.05 ratio    PTT - ( 02 Nov 2018 14:23 )  PTT:25.9 sec    Lactate: 2.5    Plan (orders must be placed in EMR):     [ x ]  Check Repeat Lactate   [ x ]  No change in current plan  [  ]  Start Vasopressors:  [  ]  Repeat Fluid Bolus:  [  ] other:    Nazanin Hector MD, R3  MAR Spectra: [id number]

## 2018-11-02 NOTE — H&P ADULT - ASSESSMENT
89F with PMHx of mild dementia, CAD, HLD, HTN, spinal stenosis who p/w left hip pain and elevated WBC on outpatient labs found to have likely osteomyelitis of left hip and new lung mass concerning for primary lung CA. 89F with PMHx of mild dementia, CAD, HLD, HTN, spinal stenosis who p/w left hip pain and elevated WBC on outpatient labs found to have likely osteomyelitis of sacrum, large fluid collection in left left hip, and an incidental new RUL lung mass concerning for primary lung CA.

## 2018-11-02 NOTE — H&P ADULT - PROBLEM SELECTOR PLAN 6
- c/w atorvastatin 40 Pt reportedly takes carvedilol, amlodipine, and torsemide as outpatient per Allscripts review, however unable to reach daughter or pharmacy (closed) for verification  - c/w home amlodipine and carvediolol  - holding torsemide in setting of ?active infection, add back as clinically indicated. Echo from 1/2018 showed EF60%, normal LVSF and mild concentric LVH Pain currently well controlled, has not needed pain medication since presentation  - pain management with tylenol for now

## 2018-11-02 NOTE — ED PROVIDER NOTE - SKIN WOUND DESCRIPTION
Left calcaneous medial aspect with 3x3cm stage II ulcer with no discharge or surrounding erythema Left calcaneous medial aspect with 3x3cm stage II ulcer with no discharge or surrounding erythema. Large stage II sacral ulcer with surrounding erythema +warmth ttp

## 2018-11-02 NOTE — H&P ADULT - HISTORY OF PRESENT ILLNESS
Pt is a 89F with PMHx of mild dementia, CAD, HLD, HTN, spinal stenosis who p/w left hip pain and elevated WBC on outpatient labs. Pt presented to PMD's office yesterday with complaint of left hip pain that had been progressing for 5 days. She has a known sacral decubitus ulcer. The pain is localized to her left hip, is a constant dull ache. Sitting for prolonged period of time makes the pain worse. she denies numbness or tingling in her LLE. She had a total hip replacement "years" ago in [place], she cannot recall the name of her surgeon. She denies current or recent hx of F/C/N/V, CP/SOB, dysuria, constipation/diarrhea. No recent trauma, travel, or sick contacts. Her PMD prescribed her 20mg of prednisone for 3 days and referred her to ortho. She was called back today to go to the emergency room after CBC showed elevated WBC count.    In ED, initial VS were T98.2 HR60 /62 RR18 SaO2 94% on RA. Labs notable for WBC count of 20.1, creatinine of 1.4 (baseline 1.4 - 1.5)

## 2018-11-02 NOTE — CONSULT NOTE ADULT - ASSESSMENT
89F r/o left prosthetic joint infection  NPO after midnight, IVF  Recommend IR aspiration of fluid collection for cell count, culture, and crystals  Hold abx until aspiration  Analgesia  DVT ppx  WBAT  Incentive spirometry  PT/OT  Will d/w attending any further recommendations

## 2018-11-02 NOTE — H&P ADULT - PROBLEM SELECTOR PLAN 7
DVT ppx: SCDs in setting of possible IR intervention tomorrow  Diet: NPO pending possible IR intervention tomorrow  GOC: daughter makes healthcare decisions for patient, clarify GOC in AM - c/w donepezil 10mg QD - c/w atorvastatin 40 Pt reportedly takes carvedilol, amlodipine, and torsemide as outpatient per Allscripts review, however unable to reach daughter or pharmacy (closed) for verification  - c/w home amlodipine and carvediolol  - holding torsemide in setting of ?active infection, add back as clinically indicated. Echo from 1/2018 showed EF60%, normal LVSF and mild concentric LVH

## 2018-11-02 NOTE — ED PROVIDER NOTE - MUSCULOSKELETAL, MLM
Spine appears normal, range of motion is not limited, no muscle or joint tenderness Spine appears normal, B/L UE and RLE full range of motion, painless; some pain c passive L hip range of motion without obvious erythema or d/c.

## 2018-11-02 NOTE — H&P ADULT - NSHPREVIEWOFSYSTEMS_GEN_ALL_CORE
CONSTITUTIONAL: No weakness, fevers or chills  EYES/ENT: No visual changes;  No vertigo or throat pain   NECK: No pain or stiffness  RESPIRATORY: No cough, wheezing, hemoptysis; No shortness of breath  CARDIOVASCULAR: No chest pain or palpitations  GASTROINTESTINAL: No abdominal or epigastric pain. No nausea, vomiting, or hematemesis; No diarrhea or constipation. No melena or hematochezia.  GENITOURINARY: No dysuria, frequency or hematuria  NEUROLOGICAL: No numbness or weakness  MSK: +left hip pain  SKIN: No itching, burning, rashes, or lesions   All other review of systems is negative unless indicated above.

## 2018-11-02 NOTE — ED ADULT NURSE NOTE - OBJECTIVE STATEMENT
89 yo female A&OX3 presents to the ED with the c/o generalized body weakness. As per family, pt has been c/o generalized body pain and weakness. Denies fevers, + chills yesterday. No decrease in po intake. Pt unable to ambulate due to weakness and pain. Was called today and told that she has a elevated WBC, unknown count. Lungs clear, equal b/l no sob or cough. Abd round, soft non tender and non distended. Denies any n/v/d. MAEX4

## 2018-11-02 NOTE — H&P ADULT - PROBLEM SELECTOR PLAN 1
Pt with elevated WBC, ESR, and CT a/p showing concern for occygeal osteomyelitis, most likely 2/2 known sacral decubitus ulcer. Large fluid collection in subcutaneous tissue adjacent to left hip, ddx includes hematoma vs infection. Evaluated by ortho in ED who recommend IR sampling in AM and holding off on antibiotics until collected. Received one dose of aztreonam and vanc in ED (PCN allergic). Lactate initially elevated to 2.5 on presentation, now wnl after 2.5L NS bolus. Vitally stable and nontoxic appearing on exam.  - IR c/s in AM for possible drainage/sample of fluid from subcutaneous tissue of left hip. Will hold any more antibiotics until then.  - f/u blood cultures  - c/w mIVF: NS at 75 for 10 hours  - PT eval, WBAT  - fall precautions  - incentive spirometry Pt with elevated WBC, ESR, CRP, and CT a/p showing concern for occygeal osteomyelitis, most likely 2/2 known sacral decubitus ulcer. Received one dose of aztreonam and vanc in ED (PCN allergic). Lactate initially elevated to 2.5 on presentation, now wnl after 2.5L NS bolus. Vitally stable and nontoxic appearing on exam.  - IR c/s in AM for possible drainage/sample of fluid from subcutaneous tissue of left hip. Will hold any more antibiotics until then.  - f/u blood cultures  - obtain MRI pelvis  - PT eval, WBAT  - fall precautions  - incentive spirometry  - wound care consult Pt with elevated WBC, ESR, CRP, and CT a/p showing concern for coccygeal osteomyelitis, most likely 2/2 known sacral decubitus ulcer. Received one dose of aztreonam and vanc in ED (PCN allergic). Lactate initially elevated to 2.5 on presentation, now wnl after 2.5L NS bolus. Vitally stable and nontoxic appearing on exam.  - restart antibiotics after IR drainage of fluid collection as below  - f/u blood cultures  - obtain MRI pelvis  - PT eval, WBAT  - fall precautions  - incentive spirometry  - wound care consult  - consider ID consult pending results of MRI

## 2018-11-02 NOTE — H&P ADULT - FAMILY HISTORY
No pertinent family history in first degree relatives No pertinent family history in first degree relatives, unable to obtain full fam hx from pt 2/2 dementia

## 2018-11-02 NOTE — H&P ADULT - PROBLEM SELECTOR PLAN 8
DVT ppx: SCDs in setting of possible IR intervention tomorrow  Diet: NPO pending possible IR intervention tomorrow  GOC: daughter makes healthcare decisions for patient, clarify GOC in AM - c/w donepezil 10mg QD

## 2018-11-02 NOTE — ED PROVIDER NOTE - PROGRESS NOTE DETAILS
lact/WBC noted.  Does not meet sepsis criteria but this likely represents sepsis and will treat accordingly.  Vanco/aztreonam and fluids ordered.  --BMM

## 2018-11-02 NOTE — H&P ADULT - PROBLEM SELECTOR PLAN 2
New finding on CT chest concerning for primary lung CA, unclear if pt would want further workup. Attempts to reach daughter unsuccessful  - day team to clarify GOC in AM Large fluid collection in subcutaneous tissue adjacent to left hip, ddx includes hematoma vs infection. Evaluated by ortho in ED who recommend IR sampling in AM and holding off on antibiotics until collected.  - IR c/s in AM for possible drainage/sample of fluid from subcutaneous tissue of left hip - IR c/s in AM for possible drainage/sample of fluid from subcutaneous tissue of left hip. Will hold any more antibiotics until then.  Large fluid collection in subcutaneous tissue adjacent to left hip, ddx includes hematoma vs infection. Evaluated by ortho in ED who recommend IR sampling in AM and holding off on antibiotics until collected.  - IR c/s in AM for possible drainage/sample of fluid from subcutaneous tissue of left hip

## 2018-11-02 NOTE — H&P ADULT - PROBLEM SELECTOR PLAN 4
Pain currently well controlled  - pain management with tylenol for mild-mod pain, tramadol severe pain Creatinine on admission 1.40, appears at baseline (Cr ~1.5 from as far back as 2017 per Allscripts)  - renally dose all meds, avoid nephrotoxins  - BMP QD - unclear etiol  - check vitd, pth, pthrp for now  - s/p 2.5L ns  - further workup pending goc discussion

## 2018-11-02 NOTE — ED PROVIDER NOTE - CARE PLAN
Principal Discharge DX:	Cellulitis of buttock  Secondary Diagnosis:	Pneumonia due to infectious organism, unspecified laterality, unspecified part of lung Principal Discharge DX:	Osteomyelitis of sacrum  Secondary Diagnosis:	Pneumonia due to infectious organism, unspecified laterality, unspecified part of lung

## 2018-11-02 NOTE — CONSULT NOTE ADULT - SUBJECTIVE AND OBJECTIVE BOX
89yFemale presenting for evaluation of sacral ulcer. CT imaging performed which found incidental fluid collection surrounding left prosthetic hip. Prosthetic hip done in [place] years ago, but cannot recall surgeon. Patient denies pain, numbness, or tingling in the LLE. Patient denies any acute trauma. Patient has no other complaints at this time.    PMH:  No pertinent past medical history    PSH:  L total hip, unknown surgeon    AH:  penicillin (Unknown)    Meds: See med rec    T(C): 37.1 (11-02-18 @ 17:30)  HR: 60 (11-02-18 @ 13:08)  BP: 150/76 (11-02-18 @ 13:08)  RR: 16 (11-02-18 @ 13:08)  SpO2: 100% (11-02-18 @ 13:08)  Wt(kg): --    PE LLE:  Skin intact, no ecchymosis, SILT L2-S1, +EHL/FHL/TA/Gastroc, +hip/Knee/ankle ROM, DP+, soft compartments, no calf ttp, +log roll.    Secondary:  No TTP over bony landmarks, SILT BL, ROM intact BL, distal pulses palpable.    Imaging:  CT demonstrating fluid collection surrounding L prosthetic hip, cannot rule out infection 89yFemale presenting for evaluation of sacral ulcer. Patient also has been complaining of hip pain for about a week. CT imaging performed which found incidental fluid collection surrounding left prosthetic hip. Prosthetic hip done in [place] years ago, but cannot recall surgeon. Patient denies pain, numbness, or tingling in the LLE. Patient denies any acute trauma. Patient has no other complaints at this time.    PMH:  mild dementia, CAD, HLD, HTN, spinal stenosis     PSH:  L total hip, unknown surgeon    AH:  penicillin (Unknown)    Meds: See med rec    T(C): 37.1 (11-02-18 @ 17:30)  HR: 60 (11-02-18 @ 13:08)  BP: 150/76 (11-02-18 @ 13:08)  RR: 16 (11-02-18 @ 13:08)  SpO2: 100% (11-02-18 @ 13:08)  Wt(kg): --    PE LLE:  Skin intact, no ecchymosis, SILT L2-S1, +EHL/FHL/TA/Gastroc, +hip/Knee/ankle ROM, DP+, soft compartments, no calf ttp, +log roll.    Secondary:  No TTP over bony landmarks, SILT BL, ROM intact BL, distal pulses palpable.    Imaging:  CT demonstrating fluid collection surrounding L prosthetic hip, cannot rule out infection

## 2018-11-02 NOTE — H&P ADULT - PROBLEM SELECTOR PLAN 9
DVT ppx: SCDs in setting of ?hematoma, possible IR intervention  Diet: NPO pending possible IR intervention tomorrow  GOC: daughter makes healthcare decisions for patient, clarify GOC in AM

## 2018-11-02 NOTE — H&P ADULT - NSHPPHYSICALEXAM_GEN_ALL_CORE
.  VITAL SIGNS:  T(C): 36.9 (11-02-18 @ 22:10), Max: 37.1 (11-02-18 @ 17:30)  T(F): 98.4 (11-02-18 @ 22:10), Max: 98.7 (11-02-18 @ 17:30)  HR: 58 (11-02-18 @ 22:10) (58 - 60)  BP: 146/74 (11-02-18 @ 22:10) (146/74 - 159/62)  BP(mean): --  RR: 17 (11-02-18 @ 22:10) (16 - 18)  SpO2: 96% (11-02-18 @ 22:10) (94% - 100%)  Wt(kg): --    PHYSICAL EXAM:    Constitutional: WDWN resting comfortably in bed; NAD  Head: NC/AT  Eyes: PERRLA, EOMI, clear conjunctiva  ENT: no nasal discharge; no oropharyngeal erythema or exudates  Neck: supple; no JVD or thyromegaly  Respiratory: CTA B/L; no W/R/R, no retractions  Cardiac: +S1/S2; RRR; no M/R/G  Gastrointestinal: soft, NT/ND; no rebound or guarding; +BS, no hepatosplenomegaly  Extremities: WWP, no clubbing or cyanosis; no peripheral edema, no pain to palpation of hip joint, mild tenderness along lateral aspect of left leg with straight leg raise  Back: sacral ulcer eith erythema, ttp, covered with bandage C/D/I   Vascular: 2+ radial, DP/PT pulses B/L  Lymphatic: no submandibular or cervical LAD  Neurologic: AAOx3; CNII-XII grossly intact

## 2018-11-02 NOTE — ED ADULT NURSE REASSESSMENT NOTE - NS ED NURSE REASSESS COMMENT FT1
Pt has redness to b/l buttocks with unstageable wound. No drainage or bleeding. Site is warm to touch

## 2018-11-02 NOTE — ED PROVIDER NOTE - MEDICAL DECISION MAKING DETAILS
89 yo F with pmhx htn, cad, hld and dementia brought in for leukocytosis. Will obtain labs, urine and xray. 89 yo F with pmhx htn, cad, hld and dementia brought in for leukocytosis. Will obtain labs, urine and xray.  Attending Statement: Agree with the above.  89 y F clinically infected sacral decub ulcer, lethargy, rigors/fever at home, WBC at PCP was 20.  Appears lethargic on exam though NAD.  Clinical dehydration.  Empiric abx, labs, fluids, CT to better delineate sacral infection.  --BMM

## 2018-11-02 NOTE — ED PROVIDER NOTE - OBJECTIVE STATEMENT
89 yo F with pmhx htn, cad, hld and dementia brought in for "elevated WBC". As per family, Patient went to the doctor today for an ulcer to the left foot and buttock area. Patient had been co of pain. Patient seen by PCP who sent her in for wbc of "20". family and aid deny fever, cough, diarrhea and vomiting 89 yo F with pmhx htn, cad, hld, hip replacement and dementia brought in for "elevated WBC". As per family, Patient went to the doctor today for an ulcer to the left foot and buttock area. Patient had been co of pain. Patient seen by PCP who sent her in for wbc of "20". family and aid deny fever, cough, diarrhea and vomiting 89 yo F with pmhx htn, cad, hld, hip replacement and dementia brought in for "elevated WBC". As per family, Patient went to the doctor today for an ulcer to the left foot and buttock area. Patient had been co of pain. Patient seen by PCP who sent her in for wbc of "20". family and aid deny fever, cough, diarrhea and vomiting    Respiratory doctor- Wellington Retana

## 2018-11-02 NOTE — H&P ADULT - PROBLEM SELECTOR PLAN 3
Creatinine on admission 1.40, appears at baseline (Cr ~1.5 from as far back as 2017 per Allscripts)  - renally dose all meds, avoid nephrotoxins  - BMP QD New finding on CT chest concerning for primary lung CA, unclear if pt would want further workup. Attempts to reach daughter unsuccessful. Pt unaware of finding, pending discussion with daughter who is HCP.  - day team to clarify GOC in AM New finding on CT chest concerning for primary lung CA, unclear if pt would want further workup. Attempts to reach daughter unsuccessful. Will need discussion with daughter who is HCP.  - day team to clarify GOC in AM  - consider onc/CTS consults if pt and family wish to pursue further workup

## 2018-11-03 DIAGNOSIS — E83.52 HYPERCALCEMIA: ICD-10-CM

## 2018-11-03 LAB
24R-OH-CALCIDIOL SERPL-MCNC: 51.5 NG/ML — SIGNIFICANT CHANGE UP (ref 30–80)
ANION GAP SERPL CALC-SCNC: 12 MMOL/L — SIGNIFICANT CHANGE UP (ref 5–17)
APPEARANCE UR: CLEAR — SIGNIFICANT CHANGE UP
BACTERIA # UR AUTO: NEGATIVE — SIGNIFICANT CHANGE UP
BASOPHILS # BLD AUTO: 0.02 K/UL — SIGNIFICANT CHANGE UP (ref 0–0.2)
BASOPHILS NFR BLD AUTO: 0.1 % — SIGNIFICANT CHANGE UP (ref 0–2)
BILIRUB UR-MCNC: NEGATIVE — SIGNIFICANT CHANGE UP
BUN SERPL-MCNC: 36 MG/DL — HIGH (ref 7–23)
CALCIUM SERPL-MCNC: 10.1 MG/DL — SIGNIFICANT CHANGE UP (ref 8.4–10.5)
CALCIUM SERPL-MCNC: 10.3 MG/DL — SIGNIFICANT CHANGE UP (ref 8.4–10.5)
CHLORIDE SERPL-SCNC: 104 MMOL/L — SIGNIFICANT CHANGE UP (ref 96–108)
CO2 SERPL-SCNC: 25 MMOL/L — SIGNIFICANT CHANGE UP (ref 22–31)
COLOR SPEC: SIGNIFICANT CHANGE UP
CREAT SERPL-MCNC: 1.19 MG/DL — SIGNIFICANT CHANGE UP (ref 0.5–1.3)
DIFF PNL FLD: NEGATIVE — SIGNIFICANT CHANGE UP
EOSINOPHIL # BLD AUTO: 0.33 K/UL — SIGNIFICANT CHANGE UP (ref 0–0.5)
EOSINOPHIL NFR BLD AUTO: 2.2 % — SIGNIFICANT CHANGE UP (ref 0–6)
EPI CELLS # UR: 1 /HPF — SIGNIFICANT CHANGE UP
GLUCOSE SERPL-MCNC: 73 MG/DL — SIGNIFICANT CHANGE UP (ref 70–99)
GLUCOSE UR QL: NEGATIVE — SIGNIFICANT CHANGE UP
GRAM STN FLD: SIGNIFICANT CHANGE UP
GRAM STN FLD: SIGNIFICANT CHANGE UP
HCT VFR BLD CALC: 34.3 % — LOW (ref 34.5–45)
HCT VFR BLD CALC: 35.3 % — SIGNIFICANT CHANGE UP (ref 34.5–45)
HGB BLD-MCNC: 10.9 G/DL — LOW (ref 11.5–15.5)
HGB BLD-MCNC: 11.2 G/DL — LOW (ref 11.5–15.5)
HYALINE CASTS # UR AUTO: 0 /LPF — SIGNIFICANT CHANGE UP (ref 0–2)
IMM GRANULOCYTES NFR BLD AUTO: 1.4 % — SIGNIFICANT CHANGE UP (ref 0–1.5)
KETONES UR-MCNC: NEGATIVE — SIGNIFICANT CHANGE UP
LEUKOCYTE ESTERASE UR-ACNC: ABNORMAL
LYMPHOCYTES # BLD AUTO: 1.53 K/UL — SIGNIFICANT CHANGE UP (ref 1–3.3)
LYMPHOCYTES # BLD AUTO: 10 % — LOW (ref 13–44)
MAGNESIUM SERPL-MCNC: 1.6 MG/DL — SIGNIFICANT CHANGE UP (ref 1.6–2.6)
MCHC RBC-ENTMCNC: 28.2 PG — SIGNIFICANT CHANGE UP (ref 27–34)
MCHC RBC-ENTMCNC: 29 PG — SIGNIFICANT CHANGE UP (ref 27–34)
MCHC RBC-ENTMCNC: 30.9 GM/DL — LOW (ref 32–36)
MCHC RBC-ENTMCNC: 32.7 GM/DL — SIGNIFICANT CHANGE UP (ref 32–36)
MCV RBC AUTO: 88.9 FL — SIGNIFICANT CHANGE UP (ref 80–100)
MCV RBC AUTO: 91.5 FL — SIGNIFICANT CHANGE UP (ref 80–100)
MONOCYTES # BLD AUTO: 1.13 K/UL — HIGH (ref 0–0.9)
MONOCYTES NFR BLD AUTO: 7.4 % — SIGNIFICANT CHANGE UP (ref 2–14)
NEUTROPHILS # BLD AUTO: 12.04 K/UL — HIGH (ref 1.8–7.4)
NEUTROPHILS NFR BLD AUTO: 78.9 % — HIGH (ref 43–77)
NITRITE UR-MCNC: NEGATIVE — SIGNIFICANT CHANGE UP
PH UR: 6 — SIGNIFICANT CHANGE UP (ref 5–8)
PHOSPHATE SERPL-MCNC: 2.5 MG/DL — SIGNIFICANT CHANGE UP (ref 2.5–4.5)
PLATELET # BLD AUTO: 378 K/UL — SIGNIFICANT CHANGE UP (ref 150–400)
PLATELET # BLD AUTO: 423 K/UL — HIGH (ref 150–400)
POTASSIUM SERPL-MCNC: 3.9 MMOL/L — SIGNIFICANT CHANGE UP (ref 3.5–5.3)
POTASSIUM SERPL-SCNC: 3.9 MMOL/L — SIGNIFICANT CHANGE UP (ref 3.5–5.3)
PROT UR-MCNC: NEGATIVE — SIGNIFICANT CHANGE UP
PTH-INTACT FLD-MCNC: 81 PG/ML — HIGH (ref 15–65)
RBC # BLD: 3.86 M/UL — SIGNIFICANT CHANGE UP (ref 3.8–5.2)
RBC # BLD: 3.86 M/UL — SIGNIFICANT CHANGE UP (ref 3.8–5.2)
RBC # FLD: 15.9 % — HIGH (ref 10.3–14.5)
RBC # FLD: 16.2 % — HIGH (ref 10.3–14.5)
RBC CASTS # UR COMP ASSIST: 1 /HPF — SIGNIFICANT CHANGE UP (ref 0–4)
SODIUM SERPL-SCNC: 141 MMOL/L — SIGNIFICANT CHANGE UP (ref 135–145)
SP GR SPEC: 1.02 — SIGNIFICANT CHANGE UP (ref 1.01–1.02)
SPECIMEN SOURCE: SIGNIFICANT CHANGE UP
UROBILINOGEN FLD QL: NEGATIVE — SIGNIFICANT CHANGE UP
WBC # BLD: 15.27 K/UL — HIGH (ref 3.8–10.5)
WBC # BLD: 17.17 K/UL — HIGH (ref 3.8–10.5)
WBC # FLD AUTO: 15.27 K/UL — HIGH (ref 3.8–10.5)
WBC # FLD AUTO: 17.17 K/UL — HIGH (ref 3.8–10.5)
WBC UR QL: 27 /HPF — HIGH (ref 0–5)

## 2018-11-03 PROCEDURE — 10030 IMG GID FLU COLL DRG SFT TIS: CPT

## 2018-11-03 PROCEDURE — 99233 SBSQ HOSP IP/OBS HIGH 50: CPT

## 2018-11-03 RX ORDER — ACETAMINOPHEN 500 MG
650 TABLET ORAL EVERY 6 HOURS
Qty: 0 | Refills: 0 | Status: DISCONTINUED | OUTPATIENT
Start: 2018-11-03 | End: 2018-11-08

## 2018-11-03 RX ORDER — TRAMADOL HYDROCHLORIDE 50 MG/1
25 TABLET ORAL EVERY 6 HOURS
Qty: 0 | Refills: 0 | Status: DISCONTINUED | OUTPATIENT
Start: 2018-11-03 | End: 2018-11-08

## 2018-11-03 RX ADMIN — TRAMADOL HYDROCHLORIDE 25 MILLIGRAM(S): 50 TABLET ORAL at 21:14

## 2018-11-03 RX ADMIN — GABAPENTIN 100 MILLIGRAM(S): 400 CAPSULE ORAL at 21:12

## 2018-11-03 RX ADMIN — GABAPENTIN 100 MILLIGRAM(S): 400 CAPSULE ORAL at 05:31

## 2018-11-03 RX ADMIN — CARVEDILOL PHOSPHATE 6.25 MILLIGRAM(S): 80 CAPSULE, EXTENDED RELEASE ORAL at 18:00

## 2018-11-03 RX ADMIN — AMLODIPINE BESYLATE 2.5 MILLIGRAM(S): 2.5 TABLET ORAL at 05:31

## 2018-11-03 RX ADMIN — GABAPENTIN 100 MILLIGRAM(S): 400 CAPSULE ORAL at 13:08

## 2018-11-03 RX ADMIN — CARVEDILOL PHOSPHATE 6.25 MILLIGRAM(S): 80 CAPSULE, EXTENDED RELEASE ORAL at 05:31

## 2018-11-03 RX ADMIN — ATORVASTATIN CALCIUM 40 MILLIGRAM(S): 80 TABLET, FILM COATED ORAL at 21:13

## 2018-11-03 RX ADMIN — TRAMADOL HYDROCHLORIDE 25 MILLIGRAM(S): 50 TABLET ORAL at 15:13

## 2018-11-03 NOTE — PROGRESS NOTE ADULT - SUBJECTIVE AND OBJECTIVE BOX
Interventional Radiology Brief- Operative Note    Procedure: Ultrasound and Fluoroscopically guided left hip abscess drainage.     Operators: Dr. Reyez, Dr. Daniels.    Anesthesia (type): Local only.    Contrast: 20cc    EBL: 3cc    Findings: Large left hip soft tissue abscess with sinus tract visualized communicating with the left trochanter.    Specimens Removed: 270cc pus    Implants: 8.5 Scottish Pigtail catheter drain.    Complications: None    Condition/Disposition: Return to floor.    Please call Interventional Radiology x [phone] with any questions, concerns, or issues.

## 2018-11-03 NOTE — PROGRESS NOTE ADULT - PROBLEM SELECTOR PLAN 1
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 Received one dose of aztreonam and vanc in ED (PCN allergic). Lactate initially elevated to 2.5 on presentation, now wnl after 2.5L NS bolus. Vitally stable and nontoxic appearing on exam.  - restart antibiotics after IR drainage of fluid collection as below  - f/u blood cultures  - f/u body fluid culture  - obtain MRI pelvis  - PT eval, WBAT  - fall precautions  - incentive spirometry  - wound care consult  - consider ID consult pending results of MRI

## 2018-11-03 NOTE — PROGRESS NOTE ADULT - SUBJECTIVE AND OBJECTIVE BOX
Patient is a 89y old  Female who presents with a chief complaint of Osteomyelitis (2018 06:44)      INTERVAL HPI/OVERNIGHT EVENTS: Patient has no current complaints. States L hip does not hurt until palpation. GOC discussed and patients wishes to be DNR/DNI. Would like to be involved with all POC discussions alongside with daughter.  Denies F/C/NS/N/V/D, CP, palpatations, fluttering.    MEDICATIONS (STANDING):  amLODIPine   Tablet 2.5 milliGRAM(s) Oral daily  atorvastatin 40 milliGRAM(s) Oral at bedtime  carvedilol 6.25 milliGRAM(s) Oral every 12 hours  gabapentin 100 milliGRAM(s) Oral three times a day    MEDICATIONS  (PRN):      REVIEW OF SYSTEMS:  CONSTITUTIONAL: No fever, weight loss, or fatigue  EYES: No eye pain, visual disturbances, or discharge  ENMT:  No difficulty hearing, tinnitus, vertigo; No sinus or throat pain  NECK: No pain or stiffness  RESPIRATORY: No cough, wheezing, chills or hemoptysis; No shortness of breath  CARDIOVASCULAR: No chest pain, palpitations, dizziness, or leg swelling  GASTROINTESTINAL: No abdominal or epigastric pain. No nausea, vomiting, or hematemesis; No diarrhea or constipation. No melena or hematochezia.  GENITOURINARY: No dysuria, frequency, hematuria, or incontinence  NEUROLOGICAL: No headaches, memory loss, loss of strength, numbness, or tremors  SKIN: No itching, burning, rashes, or lesions   MUSCULOSKELETAL: No joint pain or swelling; No muscle, back, or extremity pain    T(F): 98.5 (18 @ 05:24), Max: 98.7 (18 @ 17:30)  HR: 62 (18 @ 05:30) (58 - 62)  BP: 163/75 (18 @ 05:24) (146/74 - 163/75)  RR: 18 (18 @ 05:24) (16 - 18)  SpO2: 95% (18 @ 05:24) (94% - 100%)  Wt(kg): --  CAPILLARY BLOOD GLUCOSE        I&O's Summary    2018 07:01  -  2018 07:00  --------------------------------------------------------  IN: 0 mL / OUT: 250 mL / NET: -250 mL        PHYSICAL EXAM:  GENERAL: NAD, well-groomed, well-developed  HEAD:  Atraumatic, Normocephalic  EYES: EOMI, PERRLA, conjunctiva and sclera clear  ENMT: No tonsillar erythema, exudates, or enlargement; Moist mucous membranes  NECK: Supple, No JVD, Normal thyroid  NERVOUS SYSTEM:  Alert & Oriented X3, Good concentration; Motor Strength 5/5 B/L upper and lower extremities  CHEST/LUNG: Clear to percussion bilaterally; No rales, rhonchi, wheezing, or rubs  HEART: Regular rate and rhythm; No murmurs, rubs, or gallops  ABDOMEN: Soft, Nontender, Nondistended; Bowel sounds present  EXTREMITIES:  2+ Peripheral Pulses, No clubbing, cyanosis, or edema; Scar on L hip from previous surgery. Fluid collection is on anterior distal portion of scar.  LYMPH: No lymphadenopathy noted  SKIN: No rashes or lesions    LABS:                        11.2   17.17 )-----------( 378      ( 2018 08:49 )             34.3     11-    141  |  104  |  36<H>  ----------------------------<  73  3.9   |  25  |  1.19    Ca    10.3      2018 07:05  Phos  2.5       Mg     1.6         TPro  7.5  /  Alb  3.2<L>  /  TBili  0.3  /  DBili  x   /  AST  29  /  ALT  41  /  AlkPhos  130<H>  11-02    PT/INR - ( 2018 14:23 )   PT: 12.1 sec;   INR: 1.05 ratio         PTT - ( 2018 14:23 )  PTT:25.9 sec  Urinalysis Basic - ( 2018 04:46 )    Color: Light Yellow / Appearance: Clear / S.017 / pH: x  Gluc: x / Ketone: Negative  / Bili: Negative / Urobili: Negative   Blood: x / Protein: Negative / Nitrite: Negative   Leuk Esterase: Large / RBC: 1 /hpf / WBC 27 /hpf   Sq Epi: x / Non Sq Epi: 1 /hpf / Bacteria: Negative          RADIOLOGY & ADDITIONAL TESTS:    Becca Plascencia  Internal Medicine PGY-3  Pager #[phone] ([place]) / [phone] ([facility]) Patient is a 89y old  Female who presents with a chief complaint of Osteomyelitis (2018 06:44)      INTERVAL HPI/OVERNIGHT EVENTS: Patient has no current complaints. States L hip does not hurt until palpation. IR drained fluid collection with sinus tract visualization to the greater trochanter. 270cc of pus removed. GOC discussed and patients wishes to be DNR/DNI. Would like to be involved with all POC discussions alongside with daughter.  Denies F/C/NS/N/V/D, CP, palpatations, fluttering.    MEDICATIONS (STANDING):  amLODIPine   Tablet 2.5 milliGRAM(s) Oral daily  atorvastatin 40 milliGRAM(s) Oral at bedtime  carvedilol 6.25 milliGRAM(s) Oral every 12 hours  gabapentin 100 milliGRAM(s) Oral three times a day    MEDICATIONS  (PRN):      REVIEW OF SYSTEMS:  CONSTITUTIONAL: No fever, weight loss, or fatigue  EYES: No eye pain, visual disturbances, or discharge  ENMT:  No difficulty hearing, tinnitus, vertigo; No sinus or throat pain  NECK: No pain or stiffness  RESPIRATORY: No cough, wheezing, chills or hemoptysis; No shortness of breath  CARDIOVASCULAR: No chest pain, palpitations, dizziness, or leg swelling  GASTROINTESTINAL: No abdominal or epigastric pain. No nausea, vomiting, or hematemesis; No diarrhea or constipation. No melena or hematochezia.  GENITOURINARY: No dysuria, frequency, hematuria, or incontinence  NEUROLOGICAL: No headaches, memory loss, loss of strength, numbness, or tremors  SKIN: No itching, burning, rashes, or lesions   MUSCULOSKELETAL: No joint pain or swelling; No muscle, back, or extremity pain    T(F): 98.5 (18 @ 05:24), Max: 98.7 (18 @ 17:30)  HR: 62 (18 @ 05:30) (58 - 62)  BP: 163/75 (18 @ 05:24) (146/74 - 163/75)  RR: 18 (18 @ 05:24) (16 - 18)  SpO2: 95% (18 @ 05:24) (94% - 100%)  Wt(kg): --  CAPILLARY BLOOD GLUCOSE        I&O's Summary    2018 07:01  -  2018 07:00  --------------------------------------------------------  IN: 0 mL / OUT: 250 mL / NET: -250 mL        PHYSICAL EXAM:  GENERAL: NAD, well-groomed, well-developed  HEAD:  Atraumatic, Normocephalic  EYES: EOMI, PERRLA, conjunctiva and sclera clear  ENMT: No tonsillar erythema, exudates, or enlargement; Moist mucous membranes  NECK: Supple, No JVD, Normal thyroid  NERVOUS SYSTEM:  Alert & Oriented X3, Good concentration; Motor Strength 5/5 B/L upper and lower extremities  CHEST/LUNG: Clear to percussion bilaterally; No rales, rhonchi, wheezing, or rubs  HEART: Regular rate and rhythm; No murmurs, rubs, or gallops  ABDOMEN: Soft, Nontender, Nondistended; Bowel sounds present  EXTREMITIES:  2+ Peripheral Pulses, No clubbing, cyanosis, or edema; Scar on L hip from previous surgery. Fluid collection is on anterior distal portion of scar.  LYMPH: No lymphadenopathy noted  SKIN: No rashes or lesions    LABS:                        11.2   17.17 )-----------( 378      ( 2018 08:49 )             34.3     11-    141  |  104  |  36<H>  ----------------------------<  73  3.9   |  25  |  1.19    Ca    10.3      2018 07:05  Phos  2.5       Mg     1.6         TPro  7.5  /  Alb  3.2<L>  /  TBili  0.3  /  DBili  x   /  AST  29  /  ALT  41  /  AlkPhos  130<H>  11-02    PT/INR - ( 2018 14:23 )   PT: 12.1 sec;   INR: 1.05 ratio         PTT - ( 2018 14:23 )  PTT:25.9 sec  Urinalysis Basic - ( 2018 04:46 )    Color: Light Yellow / Appearance: Clear / S.017 / pH: x  Gluc: x / Ketone: Negative  / Bili: Negative / Urobili: Negative   Blood: x / Protein: Negative / Nitrite: Negative   Leuk Esterase: Large / RBC: 1 /hpf / WBC 27 /hpf   Sq Epi: x / Non Sq Epi: 1 /hpf / Bacteria: Negative          RADIOLOGY & ADDITIONAL TESTS:    Becca Plascencia  Internal Medicine PGY-3  Pager #[phone] ([place]) / [phone] ([facility])

## 2018-11-03 NOTE — PROGRESS NOTE ADULT - PROBLEM SELECTOR PLAN 7
Pt reportedly takes carvedilol, amlodipine, and torsemide as outpatient per Allscripts review, however unable to reach daughter or pharmacy (closed) for verification  - c/w home amlodipine and carvediolol  - holding torsemide in setting of ?active infection, add back as clinically indicated. Echo from 1/2018 showed EF60%, normal LVSF and mild concentric LVH

## 2018-11-03 NOTE — CHART NOTE - NSCHARTNOTEFT_GEN_A_CORE
Case and IR findings discussed with Dr. Wells  Continue IV antibiotics  Follow gram stain and culture  Even if infection involves the prosthesis, this is a chronic infection  No urgent orthopaedic intervention at this time    TOM Dias

## 2018-11-03 NOTE — PROGRESS NOTE ADULT - SUBJECTIVE AND OBJECTIVE BOX
Pt seen and examined. Pain controlled. No acute events overnight    Vital Signs Last 24 Hrs  T(C): 36.9 (03 Nov 2018 05:24), Max: 37.1 (02 Nov 2018 17:30)  T(F): 98.5 (03 Nov 2018 05:24), Max: 98.7 (02 Nov 2018 17:30)  HR: 62 (03 Nov 2018 05:30) (58 - 62)  BP: 163/75 (03 Nov 2018 05:24) (146/74 - 163/75)  BP(mean): --  RR: 18 (03 Nov 2018 05:24) (16 - 18)  SpO2: 95% (03 Nov 2018 05:24) (94% - 100%)    Gen: NAD  LLE:  skin clean D&I  +sensation L2-S1  +dorsiflexion/plantarflexion of ankle/hallux  +dorsalis pedis pulse  Soft compartments, - calf tenderness

## 2018-11-03 NOTE — PROGRESS NOTE ADULT - PROBLEM SELECTOR PLAN 5
Creatinine on admission 1.40, appears at baseline (Cr ~1.5 from as far back as 2017 per Allscripts)  - renally dose all meds, avoid nephrotoxins  - BMP QD

## 2018-11-03 NOTE — PROGRESS NOTE ADULT - ASSESSMENT
89F with PMHx of mild dementia, CAD, HLD, HTN, spinal stenosis who p/w left hip pain and elevated WBC on outpatient labs found to have likely osteomyelitis of sacrum, large fluid collection in left left hip, and an incidental new RUL lung mass concerning for primary lung CA.

## 2018-11-03 NOTE — PROGRESS NOTE ADULT - PROBLEM SELECTOR PLAN 6
Pain currently well controlled, has not needed pain medication since presentation  - pain management with tylenol for now

## 2018-11-03 NOTE — PROGRESS NOTE ADULT - PROBLEM SELECTOR PLAN 2
- IR c/s in AM for possible drainage/sample of fluid from subcutaneous tissue of left hip. Will hold any more antibiotics until then.  Large fluid collection in subcutaneous tissue adjacent to left hip, ddx includes hematoma vs infection. Evaluated by ortho in ED who recommend IR sampling in AM and holding off on antibiotics until collected.  - IR c/s in AM for possible drainage/sample of fluid from subcutaneous tissue of left hip

## 2018-11-03 NOTE — PROGRESS NOTE ADULT - PROBLEM SELECTOR PLAN 3
New finding on CT chest concerning for primary lung CA, unclear if pt would want further workup. Attempts to reach daughter unsuccessful. Will need discussion with daughter who is HCP.  - pt requests further workup. will consult onc/CTS after principal issue addressed.

## 2018-11-03 NOTE — PROGRESS NOTE ADULT - ASSESSMENT
89F w/ left hip fluid collection r/o prosthetic joint infection  NPO pending IR availability  Recommend IR aspiration of left hip  Will need cell count, culture, and crystals  Analgesia  DVT ppx  Incentive spirometry  WBAT  P/T  Discharge planning

## 2018-11-03 NOTE — PROGRESS NOTE ADULT - ATTENDING COMMENTS
Patient seen and examined.  Agree with resident note as above.  Patient originally presented with encephalopathy, hypercalcemia and concern for osteo.  S/p Antibiotics. Clinically improved with hydration.  Continue with current management.  Awaiting IR aspiration of hip.  Once biopsied, will get ID eval.

## 2018-11-04 DIAGNOSIS — L02.91 CUTANEOUS ABSCESS, UNSPECIFIED: ICD-10-CM

## 2018-11-04 LAB
-  COAGULASE NEGATIVE STAPHYLOCOCCUS: SIGNIFICANT CHANGE UP
ANION GAP SERPL CALC-SCNC: 12 MMOL/L — SIGNIFICANT CHANGE UP (ref 5–17)
BASOPHILS # BLD AUTO: 0.02 K/UL — SIGNIFICANT CHANGE UP (ref 0–0.2)
BASOPHILS NFR BLD AUTO: 0.1 % — SIGNIFICANT CHANGE UP (ref 0–2)
BUN SERPL-MCNC: 26 MG/DL — HIGH (ref 7–23)
CALCIUM SERPL-MCNC: 10.3 MG/DL — SIGNIFICANT CHANGE UP (ref 8.4–10.5)
CHLORIDE SERPL-SCNC: 105 MMOL/L — SIGNIFICANT CHANGE UP (ref 96–108)
CO2 SERPL-SCNC: 23 MMOL/L — SIGNIFICANT CHANGE UP (ref 22–31)
CREAT SERPL-MCNC: 1.09 MG/DL — SIGNIFICANT CHANGE UP (ref 0.5–1.3)
CULTURE RESULTS: NO GROWTH — SIGNIFICANT CHANGE UP
CULTURE RESULTS: SIGNIFICANT CHANGE UP
EOSINOPHIL # BLD AUTO: 0.27 K/UL — SIGNIFICANT CHANGE UP (ref 0–0.5)
EOSINOPHIL NFR BLD AUTO: 1.9 % — SIGNIFICANT CHANGE UP (ref 0–6)
GLUCOSE SERPL-MCNC: 86 MG/DL — SIGNIFICANT CHANGE UP (ref 70–99)
GRAM STN FLD: SIGNIFICANT CHANGE UP
HCT VFR BLD CALC: 34.6 % — SIGNIFICANT CHANGE UP (ref 34.5–45)
HGB BLD-MCNC: 10.7 G/DL — LOW (ref 11.5–15.5)
IMM GRANULOCYTES NFR BLD AUTO: 1.1 % — SIGNIFICANT CHANGE UP (ref 0–1.5)
LYMPHOCYTES # BLD AUTO: 1.79 K/UL — SIGNIFICANT CHANGE UP (ref 1–3.3)
LYMPHOCYTES # BLD AUTO: 12.7 % — LOW (ref 13–44)
MAGNESIUM SERPL-MCNC: 1.8 MG/DL — SIGNIFICANT CHANGE UP (ref 1.6–2.6)
MCHC RBC-ENTMCNC: 28.2 PG — SIGNIFICANT CHANGE UP (ref 27–34)
MCHC RBC-ENTMCNC: 30.9 GM/DL — LOW (ref 32–36)
MCV RBC AUTO: 91.3 FL — SIGNIFICANT CHANGE UP (ref 80–100)
METHOD TYPE: SIGNIFICANT CHANGE UP
MONOCYTES # BLD AUTO: 0.92 K/UL — HIGH (ref 0–0.9)
MONOCYTES NFR BLD AUTO: 6.5 % — SIGNIFICANT CHANGE UP (ref 2–14)
NEUTROPHILS # BLD AUTO: 10.94 K/UL — HIGH (ref 1.8–7.4)
NEUTROPHILS NFR BLD AUTO: 77.7 % — HIGH (ref 43–77)
ORGANISM # SPEC MICROSCOPIC CNT: SIGNIFICANT CHANGE UP
ORGANISM # SPEC MICROSCOPIC CNT: SIGNIFICANT CHANGE UP
PHOSPHATE SERPL-MCNC: 2.7 MG/DL — SIGNIFICANT CHANGE UP (ref 2.5–4.5)
PLATELET # BLD AUTO: 433 K/UL — HIGH (ref 150–400)
POTASSIUM SERPL-MCNC: 4.1 MMOL/L — SIGNIFICANT CHANGE UP (ref 3.5–5.3)
POTASSIUM SERPL-SCNC: 4.1 MMOL/L — SIGNIFICANT CHANGE UP (ref 3.5–5.3)
RBC # BLD: 3.79 M/UL — LOW (ref 3.8–5.2)
RBC # FLD: 16 % — HIGH (ref 10.3–14.5)
SODIUM SERPL-SCNC: 140 MMOL/L — SIGNIFICANT CHANGE UP (ref 135–145)
SPECIMEN SOURCE: SIGNIFICANT CHANGE UP
SPECIMEN SOURCE: SIGNIFICANT CHANGE UP
WBC # BLD: 14.1 K/UL — HIGH (ref 3.8–10.5)
WBC # FLD AUTO: 14.1 K/UL — HIGH (ref 3.8–10.5)

## 2018-11-04 PROCEDURE — 99233 SBSQ HOSP IP/OBS HIGH 50: CPT

## 2018-11-04 RX ADMIN — GABAPENTIN 100 MILLIGRAM(S): 400 CAPSULE ORAL at 13:33

## 2018-11-04 RX ADMIN — TRAMADOL HYDROCHLORIDE 25 MILLIGRAM(S): 50 TABLET ORAL at 17:26

## 2018-11-04 RX ADMIN — GABAPENTIN 100 MILLIGRAM(S): 400 CAPSULE ORAL at 06:08

## 2018-11-04 RX ADMIN — CARVEDILOL PHOSPHATE 6.25 MILLIGRAM(S): 80 CAPSULE, EXTENDED RELEASE ORAL at 17:28

## 2018-11-04 RX ADMIN — GABAPENTIN 100 MILLIGRAM(S): 400 CAPSULE ORAL at 21:17

## 2018-11-04 RX ADMIN — ATORVASTATIN CALCIUM 40 MILLIGRAM(S): 80 TABLET, FILM COATED ORAL at 21:17

## 2018-11-04 RX ADMIN — TRAMADOL HYDROCHLORIDE 25 MILLIGRAM(S): 50 TABLET ORAL at 11:11

## 2018-11-04 RX ADMIN — AMLODIPINE BESYLATE 2.5 MILLIGRAM(S): 2.5 TABLET ORAL at 06:08

## 2018-11-04 RX ADMIN — TRAMADOL HYDROCHLORIDE 25 MILLIGRAM(S): 50 TABLET ORAL at 15:52

## 2018-11-04 RX ADMIN — TRAMADOL HYDROCHLORIDE 25 MILLIGRAM(S): 50 TABLET ORAL at 09:36

## 2018-11-04 RX ADMIN — CARVEDILOL PHOSPHATE 6.25 MILLIGRAM(S): 80 CAPSULE, EXTENDED RELEASE ORAL at 06:08

## 2018-11-04 NOTE — PHYSICAL THERAPY INITIAL EVALUATION ADULT - PRECAUTIONS/LIMITATIONS, REHAB EVAL
CONT: There is a large fluid collection in the subcutaneous tissues adjacent to the left hip with a tract extending towards the left hip. An air droplet is noted within.  XRay Hip 11/2: S/p L total hip arthroplasty with a complex appearing acetabular component with adjacent plates and screws for fixation. The acetabular component is somewhat vertical in appearance. There is a constraining ring around the proximal femoral component. Lateral soft tissue swelling is present. fall precautions/CONT: There is a large fluid collection in the subcutaneous tissues adjacent to the left hip with a tract extending towards the left hip. An air droplet is noted within.  XRay Hip 11/2: S/p L total hip arthroplasty with a complex appearing acetabular component with adjacent plates and screws for fixation. The acetabular component is somewhat vertical in appearance. There is a constraining ring around the proximal femoral component. Lateral soft tissue swelling is present.

## 2018-11-04 NOTE — PROGRESS NOTE ADULT - PROBLEM SELECTOR PLAN 4
- unclear etiol  - check vitd, pth, pthrp for now  - s/p 2.5L ns  - will pursue further workup - unclear etiol  - check vitd, pth, pthrp for now  - s/p 2.5L ns  - will pursue further workup  - Possibly secondary to lung malignancy Possibly secondary to suspected lung malignancy  - check vitd, pth, pthrp for now  - s/p 2.5L ns  - will pursue further workup

## 2018-11-04 NOTE — PROGRESS NOTE ADULT - ATTENDING COMMENTS
Patient seen and examined.  Agree with resident note.  Meds, labs and vitals all reviewed.  Patient with dementia, concern for coccygeal osteomyelitis, subcutaneous fluid collection around left hip, along with other chronic comorbidities.   Patient is s/p IR biopsy of left fluid collection around left hip yesterday.   Clinically non toxic appearing with downtrending WBC. Afebrile, with no signs of sepsis.   ID consult to help assist with antibiotics and next steps.  Surgery input appreciated.  Patient also with likely evidence of primary lung cancer on CT Chest.   If workup for lung cancer does occur, it will need to happen after issues with osteo are addressed. However, given age, functional status (ECOG of 4), among with other acute on chronic comorbidities, it is unlikely that any disease modifying interventions are available for this patient. Patient seen and examined.  Agree with resident note.  Meds, labs and vitals all reviewed.  Patient with dementia, concern for coccygeal osteomyelitis, subcutaneous fluid collection around left hip, along with other chronic comorbidities.   Patient is s/p IR biopsy of left fluid collection around left hip yesterday.   Clinically non toxic appearing with downtrending WBC. Afebrile, with no signs of sepsis.   ID consult to help assist with antibiotics and next steps.  Wound care  Surgery input appreciated.  Patient also with likely evidence of primary lung cancer on CT Chest. Patients daughter not interested in workup.  If workup for lung cancer does occur, it will need to happen after issues with osteo are addressed. However, given age, functional status (ECOG of 4), among with other acute on chronic comorbidities, it is unlikely that any disease modifying interventions are available for this patient.

## 2018-11-04 NOTE — PROGRESS NOTE ADULT - PROBLEM SELECTOR PLAN 6
Pain currently well controlled, has not needed pain medication since presentation  - pain management with tylenol for now Pain currently well controlled, has not needed pain medication since presentation  -Pain control with tramadol q6h PRN and tylenol

## 2018-11-04 NOTE — PROGRESS NOTE ADULT - SUBJECTIVE AND OBJECTIVE BOX
Orthopaedic Surgery Progress Note    Subjective:   Patient seen and examined  IR aspiration of superficial L hip collection performed yesterday  No acute events overnight  Denies fever/chills  L hip pain improved    Objective:  T(C): 37 (18 @ 00:05), Max: 37 (18 @ 00:05)  HR: 62 (18 @ 00:05) (58 - 76)  BP: 153/76 (18 @ 00:05) (116/63 - 163/75)  RR: 18 (18 @ 00:05) (17 - 18)  SpO2: 93% (18 @ 00:05) (93% - 96%)     @ 07:01  -   @ 07:00  --------------------------------------------------------  IN: 0 mL / OUT: 250 mL / NET: -250 mL     @ 08:01  -   @ 02:18  --------------------------------------------------------  IN: 250 mL / OUT: 660 mL / NET: -410 mL    PE  NAD  LLE:   drain in place with purulent output  motor intact GS/TA/EHL  SILT S/S/SP/DP  WWP                        10.9   15.27 )-----------( 423      ( 2018 22:22 )             35.3         141  |  104  |  36<H>  ----------------------------<  73  3.9   |  25  |  1.19    Ca    10.3      2018 07:05  Phos  2.5       Mg     1.6         TPro  7.5  /  Alb  3.2<L>  /  TBili  0.3  /  DBili  x   /  AST  29  /  ALT  41  /  AlkPhos  130<H>      PT/INR - ( 2018 14:23 )   PT: 12.1 sec;   INR: 1.05 ratio      PTT - ( 2018 14:23 )  PTT:25.9 sec    Urinalysis Basic - ( 2018 04:46 )  Color: Light Yellow / Appearance: Clear / S.017 / pH: x  Gluc: x / Ketone: Negative  / Bili: Negative / Urobili: Negative   Blood: x / Protein: Negative / Nitrite: Negative   Leuk Esterase: Large / RBC: 1 /hpf / WBC 27 /hpf   Sq Epi: x / Non Sq Epi: 1 /hpf / Bacteria: Negative    89y Female with remote hx of L ANILA now with superficial infection overlying s/p IR aspiration    - Pain control  - WBAT  - FU IR asp cultures  - FU blood cultures  - Likely needs ID consult  - Abx per ID and primary team  - No urgent orthopaedic intervention at this time    Man Dias MD

## 2018-11-04 NOTE — PROGRESS NOTE ADULT - PROBLEM SELECTOR PLAN 1
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 Received one dose of aztreonam and vanc in ED (PCN allergic). Lactate initially elevated to 2.5 on presentation, now wnl after 2.5L NS bolus. Vitally stable and nontoxic appearing on exam.  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 Received one dose of aztreonam and vanc in ED (PCN allergic). Lactate initially elevated to 2.5 on presentation, now wnl after 2.5L NS bolus. Vitally stable and nontoxic appearing on exam.  - ID consulted for abx selection in patient with hardware and penicillin allergy  - wound care consulted  - f/u blood cultures, abscess cultures  - Appreciate ortho recs, WBAT  - fall precautions  - incentive spirometry  -will consider MRI pelvis

## 2018-11-04 NOTE — PHYSICAL THERAPY INITIAL EVALUATION ADULT - ADDITIONAL COMMENTS
per pt, she lives alone in private home, no steps to enter, with 24/7 live-in HHA. Pt uses a rollator to amb. HHA assists with all functional mobility and ADLs.

## 2018-11-04 NOTE — PROGRESS NOTE ADULT - PROBLEM SELECTOR PLAN 2
- IR c/s in AM for possible drainage/sample of fluid from subcutaneous tissue of left hip. Will hold any more antibiotics until then.  Large fluid collection in subcutaneous tissue adjacent to left hip, ddx includes hematoma vs infection. Evaluated by ortho in ED who recommend IR sampling in AM and holding off on antibiotics until collected.  - IR c/s in AM for possible drainage/sample of fluid from subcutaneous tissue of left hip Left hip abscess aspirated by IR (11/3) in setting of L hip hardware  -f/u abscess culture  -f/u ID recs  -will consider MRI pelvis

## 2018-11-04 NOTE — PHYSICAL THERAPY INITIAL EVALUATION ADULT - PERTINENT HX OF CURRENT PROBLEM, REHAB EVAL
89 yo F, h/o dementia, went to PMD for ulcer to the left foot and buttock area, sent to ED for elevated WBc of 20. Pt seen by ortho for L hip pain and fluid collection, WBAT per ortho. Pt also with coccygeal OM, likely due to decubitus ulcer. S/p IR aspiration L hip on 11/3.  CT Abd 11/2: Questionable sacral decubitus ulcer to R of midline, with tract leading to the distal coccyx. The cortices of the distal coccyx are obscured, raising concern for coccygeal OM, which is not well evaluated at CT.

## 2018-11-04 NOTE — PROGRESS NOTE ADULT - PROBLEM SELECTOR PLAN 9
DVT ppx: SCDs in setting of ?hematoma, possible IR intervention  Diet: NPO pending possible IR intervention tomorrow  GOC: daughter makes healthcare decisions for patient, clarify GOC in AM DVT ppx: SCDs in setting of ?hematoma, possible IR intervention  Diet: NPO pending possible IR intervention tomorrow  GOC: daughter makes healthcare decisions for patient, clarify GOC in AM  Dispo: home w/ assist; home w/ home PT DVT ppx: SCDs in setting of ?hematoma, possible IR intervention  Diet: DASH  GOC: daughter makes healthcare decisions for patient, clarify GOC in AM  Dispo: home w/ assist; home w/ home PT

## 2018-11-04 NOTE — PROGRESS NOTE ADULT - SUBJECTIVE AND OBJECTIVE BOX
Internal Medicine Progress Note    =========================================  CONTACT INFO  Celia Krishna M.D., PGY-2  Pager: [phone] ([facility]) / [phone] ([facility])    Mon-Fri: pager covered by day team 7am-7pm;   ***Academic conferences M-F 8am-9am & 12pm-1pm- page ONLY if URGENT or if Consultant  /Monika: see chart, primary physician assigned available 7am-12pm  Sat/[place] Cross Coverage 12pm-7pm: [facility]- page [phone] for Team1-4, [facility]- pager forwarded to covering Resident  For Night coverage 7pm-7am: [facility]- page [phone] Team1-3, page [phone] Team4 & Care Model; [facility]- page [phone] for Red, [phone] for Blue  =========================================    Patient is a 89y old  Female who presents with a chief complaint of Osteomyelitis (2018 12:18)      SUBJECTIVE / OVERNIGHT EVENTS: Growing GPC in clusters in blood in one bottle.    MEDICATIONS  (STANDING):  amLODIPine   Tablet 2.5 milliGRAM(s) Oral daily  atorvastatin 40 milliGRAM(s) Oral at bedtime  carvedilol 6.25 milliGRAM(s) Oral every 12 hours  gabapentin 100 milliGRAM(s) Oral three times a day    MEDICATIONS  (PRN):  acetaminophen   Tablet .. 650 milliGRAM(s) Oral every 6 hours PRN Mild Pain (1 - 3), Moderate Pain (4 - 6)  traMADol 25 milliGRAM(s) Oral every 6 hours PRN Severe Pain (7 - 10)    Vital Signs Last 24 Hrs  T(C): 36.7 (2018 04:15), Max: 37 (2018 00:05)  T(F): 98.1 (2018 04:15), Max: 98.6 (2018 00:05)  HR: 63 (2018 04:15) (62 - 76)  BP: 149/70 (2018 04:15) (116/63 - 153/76)  BP(mean): --  RR: 18 (2018 04:15) (17 - 18)  SpO2: 94% (2018 04:15) (93% - 96%)    CAPILLARY BLOOD GLUCOSE        I&O's Summary    2018 07:01  -  2018 07:00  --------------------------------------------------------  IN: 0 mL / OUT: 250 mL / NET: -250 mL    2018 08:01  -  2018 06:52  --------------------------------------------------------  IN: 250 mL / OUT: 1500 mL / NET: -1250 mL        PHYSICAL EXAM  GENERAL: NAD  HEAD:  Atraumatic  EYES: EOMI, PERRLA, conjunctiva and sclera clear  NECK: Supple, No JVD  CHEST/LUNG: Clear to auscultation bilaterally; No wheeze  HEART: Regular rate and rhythm; No murmurs, rubs, or gallops  ABDOMEN: Soft, Nontender, Nondistended; Bowel sounds present  EXTREMITIES:  2+ Peripheral Pulses, No clubbing, cyanosis, or edema  NEURO/PSYCH: AAOx3, nonfocal  SKIN: No rashes or lesions      LABS:                        10.9   15.27 )-----------( 423      ( 2018 22:22 )             35.3     CBC Full  -  ( 2018 22:22 )  WBC Count : 15.27 K/uL  Hemoglobin : 10.9 g/dL  Hematocrit : 35.3 %  Platelet Count - Automated : 423 K/uL  Mean Cell Volume : 91.5 fl  Mean Cell Hemoglobin : 28.2 pg  Mean Cell Hemoglobin Concentration : 30.9 gm/dL  Auto Neutrophil # : 12.04 K/uL  Auto Lymphocyte # : 1.53 K/uL  Auto Monocyte # : 1.13 K/uL  Auto Eosinophil # : 0.33 K/uL  Auto Basophil # : 0.02 K/uL  Auto Neutrophil % : 78.9 %  Auto Lymphocyte % : 10.0 %  Auto Monocyte % : 7.4 %  Auto Eosinophil % : 2.2 %  Auto Basophil % : 0.1 %        141  |  104  |  36<H>  ----------------------------<  73  3.9   |  25  |  1.19    Ca    10.3      2018 07:05  Phos  2.5       Mg     1.6         TPro  7.5  /  Alb  3.2<L>  /  TBili  0.3  /  DBili  x   /  AST  29  /  ALT  41  /  AlkPhos  130<H>      Creatinine Trend: 1.19<--, 1.40<--  LIVER FUNCTIONS - ( 2018 14:23 )  Alb: 3.2 g/dL / Pro: 7.5 g/dL / ALK PHOS: 130 U/L / ALT: 41 U/L / AST: 29 U/L / GGT: x           PT/INR - ( 2018 14:23 )   PT: 12.1 sec;   INR: 1.05 ratio         PTT - ( 2018 14:23 )  PTT:25.9 sec        Urinalysis Basic - ( 2018 04:46 )    Color: Light Yellow / Appearance: Clear / S.017 / pH: x  Gluc: x / Ketone: Negative  / Bili: Negative / Urobili: Negative   Blood: x / Protein: Negative / Nitrite: Negative   Leuk Esterase: Large / RBC: 1 /hpf / WBC 27 /hpf   Sq Epi: x / Non Sq Epi: 1 /hpf / Bacteria: Negative        EKG:   MICROBIOLOGY:    Culture - Body Fluid with Gram Stain (collected 2018 17:20)  Source: .Body Fluid Interstitial Fluid  Gram Stain (2018 23:24):    Numerous polymorphonuclear leukocytes per low power field    No organisms seen per oil power field    Culture - Blood (collected 2018 17:28)  Source: .Blood Blood-Peripheral  Preliminary Report (2018 18:02):    No growth to date.    Culture - Blood (collected 2018 17:28)  Source: .Blood Blood-Peripheral  Gram Stain (2018 21:49):    Growth in aerobic bottle: Gram Positive Cocci in Clusters  Preliminary Report (2018 21:50):    Growth in aerobic bottle: Gram Positive Cocci in Clusters    "Due to technical problems, Proteus sp. will Not be reported as part of    the BCID panel until further notice"    ***Blood Panel PCR results on this specimen are available    approximately 3 hours after the Gram stain result.***    Gram stain, PCR, and/or culture results may not always    correspond due to difference in methodologies.    ************************************************************    This PCR assay was performed using [employer].    The following targets are tested for: Enterococcus,    vancomycin resistant enterococci, Listeria monocytogenes,    coagulase negative staphylococci, S. aureus,    methicillin resistant S. aureus, Streptococcus agalactiae    (Group B), S. pneumoniae, S.pyogenes (Group A),    Acinetobacter baumannii, Enterobacter cloacae, E. coli,    Klebsiella oxytoca, K. pneumoniae, Proteus sp.,    Serratia marcescens, Haemophilus influenzae,    Neisseria meningitidis, Pseudomonas aeruginosa, Candida    albicans, C. glabrata, C krusei, C parapsilosis,    C. tropicalis and the KPC resistance gene.  Organism: Blood Culture PCR (2018 01:39)  Organism: Blood Culture PCR (2018 01:39)      IMAGING:     CONSULTS: Internal Medicine Progress Note    =========================================  CONTACT INFO  Celia Krishna M.D., PGY-2  Pager: [phone] ([facility]) / [phone] ([facility])    Mon-Fri: pager covered by day team 7am-7pm;   ***Academic conferences M-F 8am-9am & 12pm-1pm- page ONLY if URGENT or if Consultant  /Monika: see chart, primary physician assigned available 7am-12pm  Sat/[place] Cross Coverage 12pm-7pm: [facility]- page [phone] for Team1-4, [facility]- pager forwarded to covering Resident  For Night coverage 7pm-7am: [facility]- page [phone] Team1-3, page [phone] Team4 & Care Model; [facility]- page [phone] for Red, [phone] for Blue  =========================================    Patient is a 89y old  Female who presents with a chief complaint of Osteomyelitis (2018 12:18)      SUBJECTIVE / OVERNIGHT EVENTS: Growing GPC in clusters in blood in one bottle. Pt has no complaints. Denies F/C, SOB, CP, abd pain, N/V/D.    MEDICATIONS  (STANDING):  amLODIPine   Tablet 2.5 milliGRAM(s) Oral daily  atorvastatin 40 milliGRAM(s) Oral at bedtime  carvedilol 6.25 milliGRAM(s) Oral every 12 hours  gabapentin 100 milliGRAM(s) Oral three times a day    MEDICATIONS  (PRN):  acetaminophen   Tablet .. 650 milliGRAM(s) Oral every 6 hours PRN Mild Pain (1 - 3), Moderate Pain (4 - 6)  traMADol 25 milliGRAM(s) Oral every 6 hours PRN Severe Pain (7 - 10)    Vital Signs Last 24 Hrs  T(C): 36.7 (2018 04:15), Max: 37 (2018 00:05)  T(F): 98.1 (2018 04:15), Max: 98.6 (2018 00:05)  HR: 63 (2018 04:15) (62 - 76)  BP: 149/70 (2018 04:15) (116/63 - 153/76)  BP(mean): --  RR: 18 (2018 04:15) (17 - 18)  SpO2: 94% (2018 04:15) (93% - 96%)    CAPILLARY BLOOD GLUCOSE        I&O's Summary    2018 07:01  -  2018 07:00  --------------------------------------------------------  IN: 0 mL / OUT: 250 mL / NET: -250 mL    2018 08:01  -  2018 06:52  --------------------------------------------------------  IN: 250 mL / OUT: 1500 mL / NET: -1250 mL      PHYSICAL EXAM  GENERAL: NAD  HEAD:  Atraumatic  EYES: EOMI, PERRLA, conjunctiva and sclera clear  NECK: Supple, No JVD  CHEST/LUNG: Clear to auscultation bilaterally; No wheeze  HEART: Regular rate and rhythm; No murmurs, rubs, or gallops  ABDOMEN: Soft, Nontender, Nondistended; Bowel sounds present  EXTREMITIES:  2+ Peripheral Pulses, No clubbing, cyanosis, or edema  NEURO/PSYCH: AAOx3, nonfocal  SKIN: No rashes. Sacrum erythematous with right open lesion without drainage, b/l heel ulcers      LABS:                         10.7   14.10 )-----------( 433      ( 2018 08:56 )             34.6     CBC Full  -  ( 2018 08:56 )  WBC Count : 14.10 K/uL  Hemoglobin : 10.7 g/dL  Hematocrit : 34.6 %  Platelet Count - Automated : 433 K/uL  Mean Cell Volume : 91.3 fl  Mean Cell Hemoglobin : 28.2 pg  Mean Cell Hemoglobin Concentration : 30.9 gm/dL  Auto Neutrophil # : 10.94 K/uL  Auto Lymphocyte # : 1.79 K/uL  Auto Monocyte # : 0.92 K/uL  Auto Eosinophil # : 0.27 K/uL  Auto Basophil # : 0.02 K/uL  Auto Neutrophil % : 77.7 %  Auto Lymphocyte % : 12.7 %  Auto Monocyte % : 6.5 %  Auto Eosinophil % : 1.9 %  Auto Basophil % : 0.1 %        140  |  105  |  26<H>  ----------------------------<  86  4.1   |  23  |  1.09    Ca    10.3      2018 07:28  Phos  2.7       Mg     1.8         TPro  7.5  /  Alb  3.2<L>  /  TBili  0.3  /  DBili  x   /  AST  29  /  ALT  41  /  AlkPhos  130<H>      Creatinine Trend: 1.09<--, 1.19<--, 1.40<--  LIVER FUNCTIONS - ( 2018 14:23 )  Alb: 3.2 g/dL / Pro: 7.5 g/dL / ALK PHOS: 130 U/L / ALT: 41 U/L / AST: 29 U/L / GGT: x           PT/INR - ( 2018 14:23 )   PT: 12.1 sec;   INR: 1.05 ratio    PTT - ( 2018 14:23 )  PTT:25.9 sec        Urinalysis Basic - ( 2018 04:46 )    Color: Light Yellow / Appearance: Clear / S.017 / pH: x  Gluc: x / Ketone: Negative  / Bili: Negative / Urobili: Negative   Blood: x / Protein: Negative / Nitrite: Negative   Leuk Esterase: Large / RBC: 1 /hpf / WBC 27 /hpf   Sq Epi: x / Non Sq Epi: 1 /hpf / Bacteria: Negative        EKG:     MICROBIOLOGY:  Culture - Body Fluid with Gram Stain (collected 2018 17:20)  Source: .Body Fluid Interstitial Fluid  Gram Stain (2018 23:24):    Numerous polymorphonuclear leukocytes per low power field    No organisms seen per oil power field    Culture - Urine (collected 2018 08:38)  Source: .Urine Clean Catch (Midstream)  Final Report (2018 11:09):    No growth    Culture - Blood (collected 2018 17:28)  Source: .Blood Blood-Peripheral  Preliminary Report (2018 18:02):    No growth to date.    Culture - Blood (collected 2018 17:28)  Source: .Blood Blood-Peripheral  Gram Stain (2018 21:49):    Growth in aerobic bottle: Gram Positive Cocci in Clusters  Preliminary Report (2018 21:50):    Growth in aerobic bottle: Gram Positive Cocci in Clusters    "Due to technical problems, Proteus sp. will Not be reported as part of    the BCID panel until further notice"    ***Blood Panel PCR results on this specimen are available    approximately 3 hours after the Gram stain result.***    Gram stain, PCR, and/or culture results may not always    correspond due to difference in methodologies.    ************************************************************    This PCR assay was performed using [employer].    The following targets are tested for: Enterococcus,    vancomycin resistant enterococci, Listeria monocytogenes,    coagulase negative staphylococci, S. aureus,    methicillin resistant S. aureus, Streptococcus agalactiae    (Group B), S. pneumoniae, S.pyogenes (Group A),    Acinetobacter baumannii, Enterobacter cloacae, E. coli,    Klebsiella oxytoca, K. pneumoniae, Proteus sp.,    Serratia marcescens, Haemophilus influenzae,    Neisseria meningitidis, Pseudomonas aeruginosa, Candida    albicans, C. glabrata, C krusei, C parapsilosis,    C. tropicalis and the KPC resistance gene.  Organism: Blood Culture PCR (2018 01:39)  Organism: Blood Culture PCR (2018 01:39)      IMAGING:       CONSULTS: Internal Medicine Progress Note    =========================================  CONTACT INFO  Celia Krishna M.D., PGY-2  Pager: [phone] ([facility]) / [phone] ([facility])    Mon-Fri: pager covered by day team 7am-7pm;   ***Academic conferences M-F 8am-9am & 12pm-1pm- page ONLY if URGENT or if Consultant  /Monika: see chart, primary physician assigned available 7am-12pm  Sat/[place] Cross Coverage 12pm-7pm: [facility]- page [phone] for Team1-4, [facility]- pager forwarded to covering Resident  For Night coverage 7pm-7am: [facility]- page 1443 Team1-3, page 1446 Team4 & Care Model; [facility]- page [phone] for Red, [phone] for Blue  =========================================    Patient is a 89y old  Female who presents with a chief complaint of Osteomyelitis (2018 12:18)      SUBJECTIVE / OVERNIGHT EVENTS: Growing GPC in clusters in blood in one bottle. Pt has no complaints, denies hip pain. L hip drain with serosanguinous fluid. Denies F/C, SOB, CP, abd pain, N/V/D.    MEDICATIONS  (STANDING):  amLODIPine   Tablet 2.5 milliGRAM(s) Oral daily  atorvastatin 40 milliGRAM(s) Oral at bedtime  carvedilol 6.25 milliGRAM(s) Oral every 12 hours  gabapentin 100 milliGRAM(s) Oral three times a day    MEDICATIONS  (PRN):  acetaminophen   Tablet .. 650 milliGRAM(s) Oral every 6 hours PRN Mild Pain (1 - 3), Moderate Pain (4 - 6)  traMADol 25 milliGRAM(s) Oral every 6 hours PRN Severe Pain (7 - 10)    Vital Signs Last 24 Hrs  T(C): 36.7 (2018 04:15), Max: 37 (2018 00:05)  T(F): 98.1 (2018 04:15), Max: 98.6 (2018 00:05)  HR: 63 (2018 04:15) (62 - 76)  BP: 149/70 (2018 04:15) (116/63 - 153/76)  BP(mean): --  RR: 18 (2018 04:15) (17 - 18)  SpO2: 94% (2018 04:15) (93% - 96%)    CAPILLARY BLOOD GLUCOSE        I&O's Summary    2018 07:01  -  2018 07:00  --------------------------------------------------------  IN: 0 mL / OUT: 250 mL / NET: -250 mL    2018 08:01  -  2018 06:52  --------------------------------------------------------  IN: 250 mL / OUT: 1500 mL / NET: -1250 mL      PHYSICAL EXAM  GENERAL: NAD  HEAD:  Atraumatic  EYES: EOMI, PERRLA, conjunctiva and sclera clear  NECK: Supple, No JVD  CHEST/LUNG: Clear to auscultation bilaterally; No wheeze  HEART: Regular rate and rhythm; No murmurs, rubs, or gallops  ABDOMEN: Soft, Nontender, Nondistended; Bowel sounds present  EXTREMITIES:  2+ Peripheral Pulses, No clubbing, cyanosis, or edema  NEURO/PSYCH: AAOx3, nonfocal  SKIN: No rashes. Sacrum erythematous with right open lesion without drainage, b/l heel ulcers      LABS:                         10.7   14.10 )-----------( 433      ( 2018 08:56 )             34.6     CBC Full  -  ( 2018 08:56 )  WBC Count : 14.10 K/uL  Hemoglobin : 10.7 g/dL  Hematocrit : 34.6 %  Platelet Count - Automated : 433 K/uL  Mean Cell Volume : 91.3 fl  Mean Cell Hemoglobin : 28.2 pg  Mean Cell Hemoglobin Concentration : 30.9 gm/dL  Auto Neutrophil # : 10.94 K/uL  Auto Lymphocyte # : 1.79 K/uL  Auto Monocyte # : 0.92 K/uL  Auto Eosinophil # : 0.27 K/uL  Auto Basophil # : 0.02 K/uL  Auto Neutrophil % : 77.7 %  Auto Lymphocyte % : 12.7 %  Auto Monocyte % : 6.5 %  Auto Eosinophil % : 1.9 %  Auto Basophil % : 0.1 %        140  |  105  |  26<H>  ----------------------------<  86  4.1   |  23  |  1.09    Ca    10.3      2018 07:28  Phos  2.7       Mg     1.8         TPro  7.5  /  Alb  3.2<L>  /  TBili  0.3  /  DBili  x   /  AST  29  /  ALT  41  /  AlkPhos  130<H>      Creatinine Trend: 1.09<--, 1.19<--, 1.40<--  LIVER FUNCTIONS - ( 2018 14:23 )  Alb: 3.2 g/dL / Pro: 7.5 g/dL / ALK PHOS: 130 U/L / ALT: 41 U/L / AST: 29 U/L / GGT: x           PT/INR - ( 2018 14:23 )   PT: 12.1 sec;   INR: 1.05 ratio    PTT - ( 2018 14:23 )  PTT:25.9 sec        Urinalysis Basic - ( 2018 04:46 )    Color: Light Yellow / Appearance: Clear / S.017 / pH: x  Gluc: x / Ketone: Negative  / Bili: Negative / Urobili: Negative   Blood: x / Protein: Negative / Nitrite: Negative   Leuk Esterase: Large / RBC: 1 /hpf / WBC 27 /hpf   Sq Epi: x / Non Sq Epi: 1 /hpf / Bacteria: Negative        EKG:     MICROBIOLOGY:  Culture - Body Fluid with Gram Stain (collected 2018 17:20)  Source: .Body Fluid Interstitial Fluid  Gram Stain (2018 23:24):    Numerous polymorphonuclear leukocytes per low power field    No organisms seen per oil power field    Culture - Urine (collected 2018 08:38)  Source: .Urine Clean Catch (Midstream)  Final Report (2018 11:09):    No growth    Culture - Blood (collected 2018 17:28)  Source: .Blood Blood-Peripheral  Preliminary Report (2018 18:02):    No growth to date.    Culture - Blood (collected 2018 17:28)  Source: .Blood Blood-Peripheral  Gram Stain (2018 21:49):    Growth in aerobic bottle: Gram Positive Cocci in Clusters  Preliminary Report (2018 21:50):    Growth in aerobic bottle: Gram Positive Cocci in Clusters    "Due to technical problems, Proteus sp. will Not be reported as part of    the BCID panel until further notice"    ***Blood Panel PCR results on this specimen are available    approximately 3 hours after the Gram stain result.***    Gram stain, PCR, and/or culture results may not always    correspond due to difference in methodologies.    ************************************************************    This PCR assay was performed using [employer].    The following targets are tested for: Enterococcus,    vancomycin resistant enterococci, Listeria monocytogenes,    coagulase negative staphylococci, S. aureus,    methicillin resistant S. aureus, Streptococcus agalactiae    (Group B), S. pneumoniae, S.pyogenes (Group A),    Acinetobacter baumannii, Enterobacter cloacae, E. coli,    Klebsiella oxytoca, K. pneumoniae, Proteus sp.,    Serratia marcescens, Haemophilus influenzae,    Neisseria meningitidis, Pseudomonas aeruginosa, Candida    albicans, C. glabrata, C krusei, C parapsilosis,    C. tropicalis and the KPC resistance gene.  Organism: Blood Culture PCR (2018 01:39)  Organism: Blood Culture PCR (2018 01:39)      IMAGING:       CONSULTS:

## 2018-11-05 ENCOUNTER — TRANSCRIPTION ENCOUNTER (OUTPATIENT)
Age: 83
End: 2018-11-05

## 2018-11-05 LAB
-  CLINDAMYCIN: SIGNIFICANT CHANGE UP
-  PENICILLIN: SIGNIFICANT CHANGE UP
-  VANCOMYCIN: SIGNIFICANT CHANGE UP
ANION GAP SERPL CALC-SCNC: 12 MMOL/L — SIGNIFICANT CHANGE UP (ref 5–17)
APTT BLD: 27.2 SEC — LOW (ref 27.5–36.3)
BASOPHILS # BLD AUTO: 0.03 K/UL — SIGNIFICANT CHANGE UP (ref 0–0.2)
BASOPHILS NFR BLD AUTO: 0.2 % — SIGNIFICANT CHANGE UP (ref 0–2)
BUN SERPL-MCNC: 22 MG/DL — SIGNIFICANT CHANGE UP (ref 7–23)
CALCIUM SERPL-MCNC: 10.2 MG/DL — SIGNIFICANT CHANGE UP (ref 8.4–10.5)
CHLORIDE SERPL-SCNC: 100 MMOL/L — SIGNIFICANT CHANGE UP (ref 96–108)
CO2 SERPL-SCNC: 24 MMOL/L — SIGNIFICANT CHANGE UP (ref 22–31)
CREAT SERPL-MCNC: 0.97 MG/DL — SIGNIFICANT CHANGE UP (ref 0.5–1.3)
EOSINOPHIL # BLD AUTO: 0.23 K/UL — SIGNIFICANT CHANGE UP (ref 0–0.5)
EOSINOPHIL NFR BLD AUTO: 1.6 % — SIGNIFICANT CHANGE UP (ref 0–6)
GLUCOSE SERPL-MCNC: 122 MG/DL — HIGH (ref 70–99)
HCT VFR BLD CALC: 34.8 % — SIGNIFICANT CHANGE UP (ref 34.5–45)
HGB BLD-MCNC: 11.3 G/DL — LOW (ref 11.5–15.5)
IMM GRANULOCYTES NFR BLD AUTO: 0.9 % — SIGNIFICANT CHANGE UP (ref 0–1.5)
INR BLD: 1.11 RATIO — SIGNIFICANT CHANGE UP (ref 0.88–1.16)
LYMPHOCYTES # BLD AUTO: 1.52 K/UL — SIGNIFICANT CHANGE UP (ref 1–3.3)
LYMPHOCYTES # BLD AUTO: 10.8 % — LOW (ref 13–44)
MAGNESIUM SERPL-MCNC: 1.7 MG/DL — SIGNIFICANT CHANGE UP (ref 1.6–2.6)
MCHC RBC-ENTMCNC: 28.9 PG — SIGNIFICANT CHANGE UP (ref 27–34)
MCHC RBC-ENTMCNC: 32.5 GM/DL — SIGNIFICANT CHANGE UP (ref 32–36)
MCV RBC AUTO: 89 FL — SIGNIFICANT CHANGE UP (ref 80–100)
METHOD TYPE: SIGNIFICANT CHANGE UP
MONOCYTES # BLD AUTO: 0.85 K/UL — SIGNIFICANT CHANGE UP (ref 0–0.9)
MONOCYTES NFR BLD AUTO: 6 % — SIGNIFICANT CHANGE UP (ref 2–14)
NEUTROPHILS # BLD AUTO: 11.35 K/UL — HIGH (ref 1.8–7.4)
NEUTROPHILS NFR BLD AUTO: 80.5 % — HIGH (ref 43–77)
PHOSPHATE SERPL-MCNC: 2.4 MG/DL — LOW (ref 2.5–4.5)
PLATELET # BLD AUTO: 355 K/UL — SIGNIFICANT CHANGE UP (ref 150–400)
POTASSIUM SERPL-MCNC: 3.7 MMOL/L — SIGNIFICANT CHANGE UP (ref 3.5–5.3)
POTASSIUM SERPL-SCNC: 3.7 MMOL/L — SIGNIFICANT CHANGE UP (ref 3.5–5.3)
PROTHROM AB SERPL-ACNC: 12.7 SEC — SIGNIFICANT CHANGE UP (ref 10–13.1)
RBC # BLD: 3.91 M/UL — SIGNIFICANT CHANGE UP (ref 3.8–5.2)
RBC # FLD: 15.9 % — HIGH (ref 10.3–14.5)
SODIUM SERPL-SCNC: 136 MMOL/L — SIGNIFICANT CHANGE UP (ref 135–145)
WBC # BLD: 14.1 K/UL — HIGH (ref 3.8–10.5)
WBC # FLD AUTO: 14.1 K/UL — HIGH (ref 3.8–10.5)

## 2018-11-05 PROCEDURE — 99233 SBSQ HOSP IP/OBS HIGH 50: CPT | Mod: GC

## 2018-11-05 PROCEDURE — 99223 1ST HOSP IP/OBS HIGH 75: CPT | Mod: GC

## 2018-11-05 PROCEDURE — 99222 1ST HOSP IP/OBS MODERATE 55: CPT

## 2018-11-05 RX ORDER — DOCUSATE SODIUM 100 MG
100 CAPSULE ORAL THREE TIMES A DAY
Qty: 0 | Refills: 0 | Status: DISCONTINUED | OUTPATIENT
Start: 2018-11-05 | End: 2018-11-08

## 2018-11-05 RX ORDER — CEFTRIAXONE 500 MG/1
2 INJECTION, POWDER, FOR SOLUTION INTRAMUSCULAR; INTRAVENOUS ONCE
Qty: 0 | Refills: 0 | Status: COMPLETED | OUTPATIENT
Start: 2018-11-05 | End: 2018-11-05

## 2018-11-05 RX ORDER — SENNA PLUS 8.6 MG/1
2 TABLET ORAL AT BEDTIME
Qty: 0 | Refills: 0 | Status: DISCONTINUED | OUTPATIENT
Start: 2018-11-05 | End: 2018-11-08

## 2018-11-05 RX ORDER — CEFTRIAXONE 500 MG/1
2 INJECTION, POWDER, FOR SOLUTION INTRAMUSCULAR; INTRAVENOUS EVERY 24 HOURS
Qty: 0 | Refills: 0 | Status: DISCONTINUED | OUTPATIENT
Start: 2018-11-06 | End: 2018-11-08

## 2018-11-05 RX ORDER — VANCOMYCIN HCL 1 G
1000 VIAL (EA) INTRAVENOUS ONCE
Qty: 0 | Refills: 0 | Status: COMPLETED | OUTPATIENT
Start: 2018-11-05 | End: 2018-11-05

## 2018-11-05 RX ORDER — CEFTRIAXONE 500 MG/1
INJECTION, POWDER, FOR SOLUTION INTRAMUSCULAR; INTRAVENOUS
Qty: 0 | Refills: 0 | Status: DISCONTINUED | OUTPATIENT
Start: 2018-11-05 | End: 2018-11-08

## 2018-11-05 RX ORDER — HEPARIN SODIUM 5000 [USP'U]/ML
5000 INJECTION INTRAVENOUS; SUBCUTANEOUS EVERY 12 HOURS
Qty: 0 | Refills: 0 | Status: DISCONTINUED | OUTPATIENT
Start: 2018-11-05 | End: 2018-11-08

## 2018-11-05 RX ORDER — POTASSIUM PHOSPHATE, MONOBASIC POTASSIUM PHOSPHATE, DIBASIC 236; 224 MG/ML; MG/ML
15 INJECTION, SOLUTION INTRAVENOUS ONCE
Qty: 0 | Refills: 0 | Status: COMPLETED | OUTPATIENT
Start: 2018-11-05 | End: 2018-11-05

## 2018-11-05 RX ADMIN — GABAPENTIN 100 MILLIGRAM(S): 400 CAPSULE ORAL at 13:19

## 2018-11-05 RX ADMIN — CEFTRIAXONE 100 GRAM(S): 500 INJECTION, POWDER, FOR SOLUTION INTRAMUSCULAR; INTRAVENOUS at 17:38

## 2018-11-05 RX ADMIN — AMLODIPINE BESYLATE 2.5 MILLIGRAM(S): 2.5 TABLET ORAL at 05:00

## 2018-11-05 RX ADMIN — Medication 250 MILLIGRAM(S): at 10:36

## 2018-11-05 RX ADMIN — Medication 100 MILLIGRAM(S): at 13:19

## 2018-11-05 RX ADMIN — Medication 650 MILLIGRAM(S): at 08:22

## 2018-11-05 RX ADMIN — HEPARIN SODIUM 5000 UNIT(S): 5000 INJECTION INTRAVENOUS; SUBCUTANEOUS at 17:38

## 2018-11-05 RX ADMIN — CARVEDILOL PHOSPHATE 6.25 MILLIGRAM(S): 80 CAPSULE, EXTENDED RELEASE ORAL at 05:00

## 2018-11-05 RX ADMIN — Medication 650 MILLIGRAM(S): at 09:12

## 2018-11-05 RX ADMIN — GABAPENTIN 100 MILLIGRAM(S): 400 CAPSULE ORAL at 21:22

## 2018-11-05 RX ADMIN — Medication 100 MILLIGRAM(S): at 21:22

## 2018-11-05 RX ADMIN — CARVEDILOL PHOSPHATE 6.25 MILLIGRAM(S): 80 CAPSULE, EXTENDED RELEASE ORAL at 17:38

## 2018-11-05 RX ADMIN — TRAMADOL HYDROCHLORIDE 25 MILLIGRAM(S): 50 TABLET ORAL at 15:37

## 2018-11-05 RX ADMIN — ATORVASTATIN CALCIUM 40 MILLIGRAM(S): 80 TABLET, FILM COATED ORAL at 21:22

## 2018-11-05 RX ADMIN — POTASSIUM PHOSPHATE, MONOBASIC POTASSIUM PHOSPHATE, DIBASIC 62.5 MILLIMOLE(S): 236; 224 INJECTION, SOLUTION INTRAVENOUS at 13:24

## 2018-11-05 RX ADMIN — TRAMADOL HYDROCHLORIDE 25 MILLIGRAM(S): 50 TABLET ORAL at 13:15

## 2018-11-05 RX ADMIN — GABAPENTIN 100 MILLIGRAM(S): 400 CAPSULE ORAL at 05:00

## 2018-11-05 RX ADMIN — SENNA PLUS 2 TABLET(S): 8.6 TABLET ORAL at 21:22

## 2018-11-05 NOTE — DISCHARGE NOTE ADULT - CARE PLAN
Principal Discharge DX:	Abscess  Goal:	Resolution  Assessment and plan of treatment:	You were initially brought to the hospital due to the fluid collection in your left hip coupled with elevated white blood cells. We had the doctors drain the abscess and the drain they placed has been draining fluid every since. We also grew the bacteria that was located in the abscess which is why you will be receiving intravenous antibiotics for the next 6 weeks.  The reason why you will be taking antibiotics for so long is because of your left hip replacement and bacteria are harder to kill in the setting of the hip replacement. The Interventional Radiologist (IR) will continue to follow-up with your care and regarding removal of the bulb and drainage catheter.  They will remove it once the drain puts out 10mL or less within 24 hours. If you have any questions regarding the bulb, please call the IR team at [phone]. If you have any questions regarding your antibiotics please call the Infectious Disease team at [phone], and they will be following your care regarding the infection and antibiotics.  Your antibiotics will go until December 18, 2018, and you will also require oral antibiotics for the rest of your life. If you begin to experience symptoms including, but not limited to, fever, night sweats, chills, difficulty breathing, nausea, vomiting, diarrhea, chest pain or palpitations please call your primary care doctor for an appointment or visit the emergency room.  Secondary Diagnosis:	Lung mass  Goal:	Monitoring  Assessment and plan of treatment:	While in the emergency department, a lung mass concerning for primary lung cancer was found on your chest xray and confirmed with a CT scan. Your family made the decision to not to pursue this issue during your stay at the hospital, but please visit your doctor or the emergency department if you become short of breath or cough blood.  Secondary Diagnosis:	Spinal stenosis  Goal:	Baseline  Assessment and plan of treatment:	You did not report any pain in your lower back or radiating down your legs, but if you do start to experience pain, start with Tylenol and schedule an appointment with you PMD.  Secondary Diagnosis:	Hypertension  Goal:	Baseline  Assessment and plan of treatment:	Your blood pressure has been well-controlled since your hospitalization.  Please continue your Coreg and Norvasc at home and visit your primary care doctor within 7 days to stabilize your medications.  Secondary Diagnosis:	CKD (chronic kidney disease), stage III  Goal:	Baseline  Assessment and plan of treatment:	Your kidneys were working great during your hospitalization.  Please continue to your medications and follow up with your kidney doctor at your next scheduled appointment.  Secondary Diagnosis:	Sacral decubitus ulcer  Goal:	Resolution  Assessment and plan of treatment:	You did not report any pain during your hospitalization in this region and an MRI confirmed that the infection had not reached the bone. Please continue to keep pressure off of this area as best as you can and schedule a doctors appointment if the area gets red, inflamed, or swollen. Principal Discharge DX:	Abscess  Goal:	Resolution  Assessment and plan of treatment:	You were initially brought to the hospital due to the fluid collection in your left hip coupled with elevated white blood cells. We had the doctors drain the abscess and the drain they placed has been draining fluid every since. We also grew the bacteria that was located in the abscess which is why you will be receiving intravenous antibiotics for the next 6 weeks.  The reason why you will be taking antibiotics for so long is because of your left hip replacement and bacteria are harder to kill in the setting of the hip replacement. The Interventional Radiologist (IR) will continue to follow-up with your care and regarding removal of the bulb and drainage catheter.  They will remove it once the drain puts out 10mL or less within 24 hours. If you have any questions regarding the bulb, please call the IR team at [phone]. If you have any questions regarding your antibiotics please call the Infectious Disease team at [phone], and they will be following your care regarding the infection and antibiotics.  Your antibiotics will go until December 18, 2018, and you will also require oral antibiotics for the rest of your life. If you begin to experience symptoms including, but not limited to, fever, night sweats, chills, difficulty breathing, nausea, vomiting, diarrhea, chest pain or palpitations please call your primary care doctor for an appointment or visit the emergency room.  Secondary Diagnosis:	Lung mass  Goal:	Monitoring  Assessment and plan of treatment:	While in the emergency department, a lung mass concerning for primary lung cancer was found on your chest xray and confirmed with a CT scan. Your family made the decision to not to pursue this issue during your stay at the hospital, but please visit your doctor or the emergency department if you become short of breath or cough blood.  Secondary Diagnosis:	Spinal stenosis  Goal:	Baseline  Assessment and plan of treatment:	You did not report any pain in your lower back or radiating down your legs, but if you do start to experience pain, start with Tylenol and schedule an appointment with you PMD.  Secondary Diagnosis:	Hypertension  Goal:	Baseline  Assessment and plan of treatment:	Your blood pressure has been well-controlled since your hospitalization.  Please continue your Coreg and Norvasc at home and visit your primary care doctor within 7 days to stabilize your medications.  Secondary Diagnosis:	Sacral decubitus ulcer  Goal:	Resolution  Assessment and plan of treatment:	You did not report any pain during your hospitalization in this region and an MRI confirmed that the infection had not reached the bone. Please continue to keep pressure off of this area as best as you can and schedule a doctors appointment if the area gets red, inflamed, or swollen.

## 2018-11-05 NOTE — PROGRESS NOTE ADULT - PROBLEM SELECTOR PLAN 1
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 Received one dose of aztreonam and vanc in ED (PCN allergic). Lactate initially elevated to 2.5 on presentation, now wnl after 2.5L NS bolus. Vitally stable and nontoxic appearing on exam.  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 Received one dose of aztreonam and vanc in ED (PCN allergic). Lactate initially elevated to 2.5 on presentation, now wnl after 2.5L NS bolus. Vitally stable and nontoxic appearing on exam.  -ID consulted for abx selection in patient with hardware and penicillin allergy but Vanc started in the meantime.  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 Received one dose of aztreonam and vanc in ED (PCN allergic). Lactate initially elevated to 2.5 on presentation, now wnl after 2.5L NS bolus. Vitally stable and nontoxic appearing on exam.  - Ceftriaxone as below  -wound care consulted  -BC grew CoNS, Body fluid culture grew GBS  -Appreciate ortho recs, WBAT  -fall precautions  -incentive spirometry  -Check MRI L/S spine

## 2018-11-05 NOTE — DISCHARGE NOTE ADULT - PATIENT PORTAL LINK FT
You can access the [facility] Patient Portal, offered by [facility], by registering with the following website: [email or web address]

## 2018-11-05 NOTE — PROGRESS NOTE ADULT - SUBJECTIVE AND OBJECTIVE BOX
Orthopaedic Surgery Progress Note    Subjective:   Patient seen and examined  IR aspiration of superficial L hip collection performed yesterday  No acute events overnight  Denies fever/chills  L hip pain improved    Objective:  Vital Signs Last 24 Hrs  T(C): 36.8 (2018 03:42), Max: 36.8 (2018 13:50)  T(F): 98.2 (2018 03:42), Max: 98.2 (2018 13:50)  HR: 68 (2018 03:44) (62 - 93)  BP: 168/65 (2018 03:44) (105/61 - 175/71)  BP(mean): --  RR: 18 (2018 03:42) (17 - 18)  SpO2: 93% (2018 03:42) (91% - 99%)    PE  NAD  LLE:   drain in place with purulent output  motor intact GS/TA/EHL  SILT S/S/SP/DP  WWP                        10.9   15.27 )-----------( 423      ( 2018 22:22 )             35.3     11    141  |  104  |  36<H>  ----------------------------<  73  3.9   |  25  |  1.19    Ca    10.3      2018 07:05  Phos  2.5       Mg     1.6         TPro  7.5  /  Alb  3.2<L>  /  TBili  0.3  /  DBili  x   /  AST  29  /  ALT  41  /  AlkPhos  130<H>  1102    PT/INR - ( 2018 14:23 )   PT: 12.1 sec;   INR: 1.05 ratio      PTT - ( 2018 14:23 )  PTT:25.9 sec    Urinalysis Basic - ( 2018 04:46 )  Color: Light Yellow / Appearance: Clear / S.017 / pH: x  Gluc: x / Ketone: Negative  / Bili: Negative / Urobili: Negative   Blood: x / Protein: Negative / Nitrite: Negative   Leuk Esterase: Large / RBC: 1 /hpf / WBC 27 /hpf   Sq Epi: x / Non Sq Epi: 1 /hpf / Bacteria: Negative    89y Female with remote hx of L ANILA now with superficial infection overlying s/p IR aspiration    - Pain control  - WBAT  - Monitor IR asp cultures  - Monitor blood cultures  - FU ID consult  - Abx per ID and primary team  - No urgent orthopaedic intervention at this time

## 2018-11-05 NOTE — DISCHARGE NOTE ADULT - MEDICATION SUMMARY - MEDICATIONS TO TAKE
I will START or STAY ON the medications listed below when I get home from the hospital:    CBC, BMP, ESR, CRP once per week while receiving antibiotics.   -- Results to be sent to fax: [phone]  -- Indication: For Abx treatment    gabapentin 100 mg oral capsule  -- 1 cap(s) by mouth 3 times a day  -- Indication: For Spinal stenosis    atorvastatin 40 mg oral tablet  -- 1 tab(s) by mouth once a day  -- Indication: For Atherosclerotic cardiovascular disease    carvedilol 6.25 mg oral tablet  -- 1 tab(s) by mouth 2 times a day  -- Indication: For Hypertension    amLODIPine 2.5 mg oral tablet  -- 1 tab(s) by mouth once a day  -- Indication: For Hypertension    cefTRIAXone 2 g intravenous injection  -- 2 gram(s) intravenously once a day   -- Indication: For Osteomyelitis    donepezil 10 mg oral tablet  -- 1 tab(s) by mouth once a day (at bedtime)  -- Indication: For Dementia

## 2018-11-05 NOTE — DISCHARGE NOTE ADULT - INSTRUCTIONS
pressure ulcer at sacral area --apply protective barrier cream---right latera big toe and left lateral big toe--apply cavilon----right heel and left heel ---cleanse with normal saline apply adaptic dressing --dsd--stefany----right picc line care

## 2018-11-05 NOTE — PROGRESS NOTE ADULT - SUBJECTIVE AND OBJECTIVE BOX
Patient is a 89y old  Female who presents with a chief complaint of Osteomyelitis (05 Nov 2018 06:20)      INTERVAL HPI/OVERNIGHT EVENTS: NARAYAN overnight. Site of L hip drainage site has mild pain and rated 5/10. Tylenol requested. Last BM 11/3. Denies F/NS/C/N/V/D/CP/Palpitations/Cough/SOB.    MEDICATIONS (STANDING):  amLODIPine   Tablet 2.5 milliGRAM(s) Oral daily  atorvastatin 40 milliGRAM(s) Oral at bedtime  carvedilol 6.25 milliGRAM(s) Oral every 12 hours  docusate sodium 100 milliGRAM(s) Oral three times a day  gabapentin 100 milliGRAM(s) Oral three times a day  senna 2 Tablet(s) Oral at bedtime    MEDICATIONS  (PRN):  acetaminophen   Tablet .. 650 milliGRAM(s) Oral every 6 hours PRN  traMADol 25 milliGRAM(s) Oral every 6 hours PRN      REVIEW OF SYSTEMS:  CONSTITUTIONAL: No fever, weight loss, or fatigue  EYES: No eye pain, visual disturbances, or discharge  ENMT:  No difficulty hearing, tinnitus, vertigo; No sinus or throat pain  NECK: No pain or stiffness  RESPIRATORY: No cough, wheezing, chills or hemoptysis; No shortness of breath  CARDIOVASCULAR: No chest pain, palpitations, dizziness, or leg swelling  GASTROINTESTINAL: No abdominal or epigastric pain. No nausea, vomiting, or hematemesis; No diarrhea or constipation. No melena or hematochezia.  GENITOURINARY: No dysuria, frequency, hematuria, or incontinence  NEUROLOGICAL: No headaches, memory loss, loss of strength, numbness, or tremors  SKIN: No itching, burning, rashes, or lesions   MUSCULOSKELETAL: No joint pain or swelling; No muscle or back pain. Mild pain (rated 5/10) @ L hip drainage site  T(F): 98.2 (11-05-18 @ 03:42), Max: 98.2 (11-04-18 @ 13:50)  HR: 68 (11-05-18 @ 03:44) (62 - 93)  BP: 168/65 (11-05-18 @ 03:44) (105/61 - 175/71)  RR: 18 (11-05-18 @ 03:42) (17 - 18)  SpO2: 93% (11-05-18 @ 03:42) (91% - 99%)  Wt(kg): --  CAPILLARY BLOOD GLUCOSE        I&O's Summary    04 Nov 2018 07:01  -  05 Nov 2018 07:00  --------------------------------------------------------  IN: 480 mL / OUT: 100 mL / NET: 380 mL    05 Nov 2018 07:01  -  05 Nov 2018 09:03  --------------------------------------------------------  IN: 0 mL / OUT: 75 mL / NET: -75 mL        PHYSICAL EXAM:  GENERAL: NAD, well-groomed, well-developed  HEAD:  Atraumatic, Normocephalic  EYES: EOMI, PERRLA, conjunctiva and sclera clear  ENMT: No tonsillar erythema, exudates, or enlargement; Moist mucous membranes  NECK: Supple, No JVD, Normal thyroid  NERVOUS SYSTEM:  Alert & Oriented X3, Good concentration; Motor Strength 5/5 B/L upper and lower extremities  CHEST/LUNG: Clear to percussion bilaterally; No rales, rhonchi, wheezing, or rubs  HEART: Regular rate and rhythm; No murmurs, rubs, or gallops  ABDOMEN: Soft, Nontender, Nondistended; Bowel sounds present  EXTREMITIES:  2+ Peripheral Pulses, No clubbing, cyanosis, or edema, no erythema but minimal edema at site of drainage.  LYMPH: No lymphadenopathy noted  SKIN: No rashes or lesions    LABS:                        10.7   14.10 )-----------( 433      ( 04 Nov 2018 08:56 )             34.6     11-05    136  |  100  |  22  ----------------------------<  122<H>  3.7   |  24  |  0.97    Ca    10.2      05 Nov 2018 06:23  Phos  2.4     11-05  Mg     1.7     11-05      PT/INR - ( 05 Nov 2018 07:55 )   PT: 12.7 sec;   INR: 1.11 ratio         PTT - ( 05 Nov 2018 07:55 )  PTT:27.2 sec        RADIOLOGY & ADDITIONAL TESTS:    Becca Plascencia  Internal Medicine PGY-3  Pager #[phone] ([place]) / [phone] ([facility]) Patient is a 89y old  Female who presents with a chief complaint of Osteomyelitis (05 Nov 2018 06:20)      INTERVAL HPI/OVERNIGHT EVENTS: NARAYAN overnight. Site of L hip drainage site has mild pain and rated 5/10. Tylenol requested. Last BM 11/3. Denies F/NS/C/N/V/D/CP/Palpitations/Cough/SOB.    MEDICATIONS (STANDING):  amLODIPine   Tablet 2.5 milliGRAM(s) Oral daily  atorvastatin 40 milliGRAM(s) Oral at bedtime  carvedilol 6.25 milliGRAM(s) Oral every 12 hours  docusate sodium 100 milliGRAM(s) Oral three times a day  gabapentin 100 milliGRAM(s) Oral three times a day  senna 2 Tablet(s) Oral at bedtime    MEDICATIONS  (PRN):  acetaminophen   Tablet .. 650 milliGRAM(s) Oral every 6 hours PRN  traMADol 25 milliGRAM(s) Oral every 6 hours PRN      REVIEW OF SYSTEMS:  CONSTITUTIONAL: No fever, weight loss, or fatigue  EYES: No eye pain, visual disturbances, or discharge  ENMT:  No difficulty hearing, tinnitus, vertigo; No sinus or throat pain  NECK: No pain or stiffness  RESPIRATORY: No cough, wheezing, chills or hemoptysis; No shortness of breath  CARDIOVASCULAR: No chest pain, palpitations, dizziness, or leg swelling  GASTROINTESTINAL: No abdominal or epigastric pain. No nausea, vomiting, or hematemesis; No diarrhea or constipation. No melena or hematochezia.  GENITOURINARY: No dysuria, frequency, hematuria, or incontinence  NEUROLOGICAL: No headaches, memory loss, loss of strength, numbness, or tremors  SKIN: No itching, burning, rashes, or lesions   MUSCULOSKELETAL: No joint pain or swelling; No muscle or back pain. Mild pain (rated 5/10) @ L hip drainage site    T(F): 98.2 (11-05-18 @ 03:42), Max: 98.2 (11-04-18 @ 13:50)  HR: 68 (11-05-18 @ 03:44) (62 - 93)  BP: 168/65 (11-05-18 @ 03:44) (105/61 - 175/71)  RR: 18 (11-05-18 @ 03:42) (17 - 18)  SpO2: 93% (11-05-18 @ 03:42) (91% - 99%)  Wt(kg): --    I&O's Summary    04 Nov 2018 07:01  -  05 Nov 2018 07:00  --------------------------------------------------------  IN: 480 mL / OUT: 100 mL / NET: 380 mL    05 Nov 2018 07:01  -  05 Nov 2018 09:03  --------------------------------------------------------  IN: 0 mL / OUT: 75 mL / NET: -75 mL        PHYSICAL EXAM:  GENERAL: NAD, well-groomed, well-developed  HEAD:  Atraumatic, Normocephalic  EYES: EOMI, PERRLA, conjunctiva and sclera clear  ENMT: No tonsillar erythema, exudates, or enlargement; Moist mucous membranes  NECK: Supple, No JVD, Normal thyroid  NERVOUS SYSTEM:  Alert & Oriented X2 (no date), Good concentration; Motor Strength 5/5 B/L upper and lower extremities  CHEST/LUNG: Clear to percussion bilaterally; No rales, rhonchi, wheezing, or rubs  HEART: Regular rate and rhythm; No murmurs, rubs, or gallops  ABDOMEN: Soft, Nontender, Nondistended; Bowel sounds present  EXTREMITIES:  2+ Peripheral Pulses, No clubbing, cyanosis, or edema, no erythema but minimal edema at site of drainage.  LYMPH: No lymphadenopathy noted  SKIN: stage II sacral decub ulcer    LABS:                        10.7   14.10 )-----------( 433      ( 04 Nov 2018 08:56 )             34.6     11-05    136  |  100  |  22  ----------------------------<  122<H>  3.7   |  24  |  0.97    Ca    10.2      05 Nov 2018 06:23  Phos  2.4     11-05  Mg     1.7     11-05      PT/INR - ( 05 Nov 2018 07:55 )   PT: 12.7 sec;   INR: 1.11 ratio         PTT - ( 05 Nov 2018 07:55 )  PTT:27.2 sec        RADIOLOGY & ADDITIONAL TESTS:

## 2018-11-05 NOTE — DISCHARGE NOTE ADULT - PROVIDER TOKENS
TOKEN:'[id number]:'[id number]' TOKEN:'[id number]:'[id number]',TOKEN:'[id number]'

## 2018-11-05 NOTE — PROGRESS NOTE ADULT - PROBLEM SELECTOR PLAN 7
Pt reportedly takes carvedilol, amlodipine, and torsemide as outpatient per [employer]scripts review, however unable to reach daughter or pharmacy (closed) for verification  - c/w home amlodipine and carvediolol  - holding torsemide in setting of ?active infection, add back as clinically indicated. Echo from 1/2018 showed EF60%, normal LVSF and mild concentric LVH Pt reportedly takes carvedilol, amlodipine, and torsemide as outpatient   - c/w home amlodipine and carvediolol  - holding torsemide in setting of ?active infection, add back as clinically indicated. Echo from 1/2018 showed EF60%, normal LVSF and mild concentric LVH

## 2018-11-05 NOTE — PROGRESS NOTE ADULT - ATTENDING COMMENTS
Patient seen and examined.   1) Hip Abscess: s/p 270 cc pus drainage over the weekend w/ culture + for GBS. Case d/w ID (Dr. Ceron). Patient has remote h/o PCN allergy. As per family, reported "hives" in the past. Will challenge w/ ceftriaxone 2 g IV daily, close monitoring for reaction. Will f/u BCX. Patient will need PICC once BCX are officially cleared fo 6 weeks ABX.  2) Sacral OM: Visualized on CT. Does not appear to be tracking to hip fluid collection. Ceftriaxone as above. Will check MRI lumbosacral spine.  3) Lung Mass: Patient w/ mild-mod dementia. She is unable to verbalize understanding of her lung mass or its implications. Does not have capacity to make decision regarding treatment. Extensive d/w grandson (Cezar). Explained 3x4 cm isolated RUL mass. He will speak with his mother and aunt (Erin and Manuela) who are the HCPs. He reports family would likely not want any further workup or treatment for this mass.     Plan d/w ID as above, team resident (Dr. Corado).    Wilmer Michelle M.D.  Hospitalist  Pager: [phone]

## 2018-11-05 NOTE — DISCHARGE NOTE ADULT - PLAN OF CARE
Resolution You were initially brought to the hospital due to the fluid collection in your left hip coupled with elevated white blood cells. We had the doctors drain the abscess and the drain they placed has been draining fluid every since. We also grew the bacteria that was located in the abscess which is why you will be receiving intravenous antibiotics for the next 6 weeks.  The reason why you will be taking antibiotics for so long is because of your left hip replacement and bacteria are harder to kill in the setting of the hip replacement. The Interventional Radiologist (IR) will continue to follow-up with your care and regarding removal of the bulb and drainage catheter.  They will remove it once the drain puts out 10mL or less within 24 hours. If you have any questions regarding the bulb, please call the IR team at [phone]. If you have any questions regarding your antibiotics please call the Infectious Disease team at [phone], and they will be following your care regarding the infection and antibiotics.  Your antibiotics will go until December 18, 2018, and you will also require oral antibiotics for the rest of your life. If you begin to experience symptoms including, but not limited to, fever, night sweats, chills, difficulty breathing, nausea, vomiting, diarrhea, chest pain or palpitations please call your primary care doctor for an appointment or visit the emergency room. Monitoring While in the emergency department, a lung mass concerning for primary lung cancer was found on your chest xray and confirmed with a CT scan. Your family made the decision to not to pursue this issue during your stay at the hospital, but please visit your doctor or the emergency department if you become short of breath or cough blood. Baseline You did not report any pain in your lower back or radiating down your legs, but if you do start to experience pain, start with Tylenol and schedule an appointment with you PMD. Your blood pressure has been well-controlled since your hospitalization.  Please continue your Coreg and Norvasc at home and visit your primary care doctor within 7 days to stabilize your medications. Your kidneys were working great during your hospitalization.  Please continue to your medications and follow up with your kidney doctor at your next scheduled appointment. You did not report any pain during your hospitalization in this region and an MRI confirmed that the infection had not reached the bone. Please continue to keep pressure off of this area as best as you can and schedule a doctors appointment if the area gets red, inflamed, or swollen.

## 2018-11-05 NOTE — CONSULT NOTE ADULT - ASSESSMENT
A/P: 89F with PMHx of mild dementia, CAD, HLD, HTN, spinal stenosis who p/w left hip pain and elevated WBC on outpatient labs found to have likely osteomyelitis of sacrum, large fluid collection in left left hip, and an incidental new RUL lung mass concerning for primary lung CA.     Bilateral Heels w/ Evolving DTI ADAPTIC dressing  Moisture Dermatitis w/ Evolving DTI- CAVILON/ Kiya w/ pericare  Compression BLE  Consider BLE Duplex   BLE elevation  Abx per Medicine/ ID  Moisturize intact skin w/ SWEEN cream BID  con't Nutrition (as tolerated), Nutrition Consult  con't Offloading   con't Pericare  Care as per medicine will follow w/ you  Upon discharge f/u as outpatient at [facility] [address] [facility] [phone]  D/w team & attng  Thank you for this consult  Shannon Hiadlgo PA-C CWS [id number]

## 2018-11-05 NOTE — DISCHARGE NOTE ADULT - MEDICATION SUMMARY - MEDICATIONS TO STOP TAKING
I will STOP taking the medications listed below when I get home from the hospital:    predniSONE 20 mg oral tablet  -- 1 tab(s) by mouth once a day    pantoprazole 40 mg oral delayed release tablet  -- 1 tab(s) by mouth once a day    torsemide 10 mg oral tablet  -- 1 tab(s) by mouth once a day

## 2018-11-05 NOTE — CONSULT NOTE ADULT - ATTENDING COMMENTS
Patient examined. Chart and X-rays reviewed. Agree with above note.    Charley Wells MD
89F with PMHx of mild dementia, CAD, HLD, HTN, spinal stenosis and left hip pain s/p drainage on 11/3 with left hip PJI GBS with PCN allergy   -low risk PCN allergy - no throat or breathing issues  -DC vanco  -ceftriaxone 2 gm iv q24  -check MRI lumbosacral spine given ?OM on CT  -bcx negative  -pt likely high risk for orthopedic surgery and would favor long term iv abx followed by lifelong suppression  -monitor for allergy manifestations  -if not improving, may need Ortho intervention   -PICC eventually     Sam Ceron  Attending Physician   Division of Infectious Disease  Pager #[phone]  After 5pm/weekend or no response, call #[phone]

## 2018-11-05 NOTE — DISCHARGE NOTE ADULT - HOSPITAL COURSE
89F with PMHx of mild dementia, CAD, HLD, HTN, sacral decubitus ulcer, spinal stenosis, L hip replacement who p/w left hip pain and elevated WBC on outpatient labs. Admitted for possilble osteomyelitis and incidental finding of RUL lung mass on CXR confirmed by CT (3.1x4.1cm). Family chose not pursue RUL mass during this admission. IR consulted to drain L hip abscess which grew GBS.  BC grew CoNS initially but likely contaminant so BC reordered which demonstrated no growth. L hip abscess has been draining serosanguineous fluid ever since. CT was concerning for sacral osteomyelitis but MRI negative for osteomyelitis. ID placed patient on Ceftriaxone x6wks with lifelong PO medication for suppression of infection in the setting of hardware. PICC line placed and ID and IR to follow as outpatient. 89F with PMHx of mild-mod dementia, CAD, HLD, HTN, sacral decubitus ulcer, spinal stenosis, L hip replacement who p/w left hip pain and elevated WBC on outpatient labs. Admitted for possible osteomyelitis and incidental finding of RUL lung mass on CXR confirmed by CT (3.1x4.1cm). Family chose not pursue RUL mass during this admission. Was discussed with them in detail regarding likely lung cancer diagnosis. IR consulted to drain L hip abscess which grew GBS.  BC grew CoNS initially but likely contaminant so BC reordered which demonstrated no growth. L hip abscess has been draining serosanguineous fluid ever since. CT was concerning for sacral osteomyelitis but MRI negative for osteomyelitis. ID placed patient on Ceftriaxone 2 g daily x6wks with lifelong PO medication for suppression of infection in the setting of hardware. PICC line placed and ID and IR to follow as outpatient. On the day of D/C, patient's condition had improved significantly. Plan of care d/w patient's family (Daughters and grandson) in detail, d/w PCP (Dr. Grissom) during hospitalization.

## 2018-11-05 NOTE — PROVIDER CONTACT NOTE (CRITICAL VALUE NOTIFICATION) - BACKGROUND
Pt admitted with L hip pain; history of HTN, HLD, spinal stenosis, decubitus ulcer, dementia, CKD III, and osteomyelitis.

## 2018-11-05 NOTE — DISCHARGE NOTE ADULT - CARE PROVIDERS DIRECT ADDRESSES
[email or web address]

## 2018-11-05 NOTE — CONSULT NOTE ADULT - ASSESSMENT
Pt is a 89F with PMHx of mild dementia, CAD, HLD, HTN, spinal stenosis and left hip pain s/p drainage on 11/3 with IR.  Pt now growing GBS, sensitivities pending.    GBS should be sensitive to PCN, however given allergy, may not best.    Plan:  - Recommend repeat blood cultures  - Pt will need 4-6 weeks for possible osteo given changes noted  - Further recs pending rounding - Pt is a 89F with PMHx of mild dementia, CAD, HLD, HTN, spinal stenosis and left hip pain s/p drainage on 11/3 with IR.  Pt now growing GBS, sensitivities pending.    GBS should be sensitive to PCN, however given allergy, may not best.  Upon further review of allergies with patient and family, pt states she had a possible reaction over 20 years ago, however does not remember the reaction.  Denies anaphylactic reaction that she can recall. Will try ceftriaxone for tx.    Plan:  - Pt will need 4-6 weeks for possible osteo given changes noted  - Please start ceftriaxone 2g q24 for deep tissue infections.  - Given CT findings, pls obtain MRI to r/o osteo of sacrum  - ID will follow

## 2018-11-05 NOTE — PROGRESS NOTE ADULT - PROBLEM SELECTOR PLAN 5
Creatinine on admission 1.40, appears at baseline (Cr ~1.5 from as far back as 2017 per Allscripts)  - renally dose all meds, avoid nephrotoxins  - BMP QD Suspect HAILY on CKD III. Cr has downtrended 1.4-->0.97.  - renally dose all meds, avoid nephrotoxins  - BMP QD

## 2018-11-05 NOTE — DISCHARGE NOTE ADULT - OTHER SIGNIFICANT FINDINGS
PROCEDURE:   CT of the Abdomen and Pelvis was performed without intravenous contrast.     IMPRESSION: Questionable sacral decubitus ulcer to the right of midline,   with tract leading to the distal coccyx. The cortices of the distal   coccyx are obscured, raising concern for coccygeal osteomyelitis, which   is not well evaluated at CT. Contrast-enhanced MRI of the sacrum and   coccyx is recommended for confirmation.  There is a large fluid collection in the subcutaneous tissues adjacent to   the left hip with a tract extending towards the left hip. An air droplet   is noted within. Please correlate for recent instrumentation. Hematoma or   infection not excluded.    Calcified structure in the presacral space; correlate with available   surgical records.      PROCEDURE:   CT of the Chest was performed without intravenous contrast.  Sagittal and coronal reformats were performed.    FINDINGS:  CHEST:   LUNGS AND LARGE AIRWAYS: Patent central airways. 3.1 x 4.1 cm mass in the   posterior right upper lobe.    IMPRESSION: A mass in the peripheral right upper lobe is consistent with   primary lung cancer.    PROCEDURE NAME  Ultrasound and Fluoroscopic guided drainage of left hip abscess.    OBSERVATIONS  Preliminary ultrasound: Large left hip soft tissue collection with sinus   tract communicating to the left greater trochanter. No fluid was seen   within the femoral head.  Fluid Aspirated: 270 cc  Fluid Appearance: Purulent  Specimen to microbiology: Yes      EXAM:  MR SPINE LUMBAR                          CLINICAL INDICATION: Sacral osteomyelitis, evaluate for chronic OM    FINDINGS:     Vertebral body height, marrow signal homogeneity, and facet alignment are   maintained throughout the visualized spinal segments. Numerous small   hemangiomas/focal fatty deposits are noted. Diffuse disc space narrowing.   The conus is normal in size, position, and signal characteristics, ending   at L1.    No marrow edema or abnormal prevertebral/paravertebral soft tissue. No   intradiscal edema.    Multilevel degenerative changes characterized by degenerative disc   disease and facet/ligamentous hypertrophy. Mild to moderate multilevel   thecal sac compression. Significant multilevel neural foraminal   narrowing. No intradiscal edema.    IMPRESSION:    No marrow edema or abnormal prevertebral/paravertebral soft tissue. No   intradiscal edema. No evidence for discitis/ostium myelitis.

## 2018-11-05 NOTE — PROGRESS NOTE ADULT - PROBLEM SELECTOR PLAN 9
DVT ppx: SCDs in setting of ?hematoma, possible IR intervention  Diet: DASH  GOC: daughter makes healthcare decisions for patient, clarify GOC in AM  Dispo: home w/ assist; home w/ home PT DVT ppx: SCDs for now  Diet: DASH  GOC: DNR/DNI  Dispo: TBD

## 2018-11-05 NOTE — PROGRESS NOTE ADULT - PROBLEM SELECTOR PLAN 6
Pain currently well controlled, has not needed pain medication since presentation  -Pain control with tramadol q6h PRN and tylenol

## 2018-11-05 NOTE — PROGRESS NOTE ADULT - ASSESSMENT
89F with PMHx of mild dementia, CAD, HLD, HTN, spinal stenosis who p/w left hip pain and elevated WBC on outpatient labs found to have likely osteomyelitis of sacrum, large fluid collection in left left hip, and an incidental new RUL lung mass concerning for primary lung CA. 89F with PMHx of mild dementia, CAD, HLD, HTN, spinal stenosis who p/w left hip pain and elevated WBC on outpatient labs found to have likely osteomyelitis of sacrum, large fluid collection in left hip, and an incidental new RUL lung mass concerning for primary lung CA.

## 2018-11-05 NOTE — DISCHARGE NOTE ADULT - SECONDARY DIAGNOSIS.
Lung mass Spinal stenosis Hypertension CKD (chronic kidney disease), stage III Sacral decubitus ulcer

## 2018-11-05 NOTE — CONSULT NOTE ADULT - SUBJECTIVE AND OBJECTIVE BOX
Wound SURGERY CONSULT NOTE    HPI:  Pt is a 89F with PMHx of mild dementia, CAD, HLD, HTN, spinal stenosis who p/w left hip pain and elevated WBC on outpatient labs. Pt presented to PMD's office yesterday with complaint of left hip pain that had been progressing for 5 days. She has a known sacral decubitus ulcer. The pain is localized to her left hip, is a constant dull ache. Sitting for prolonged period of time makes the pain worse. she denies numbness or tingling in her LLE. She had a total hip replacement "years" ago in [place], she cannot recall the name of her surgeon. She denies current or recent hx of F/C/N/V, CP/SOB, dysuria, constipation/diarrhea. No recent trauma, travel, or sick contacts. Her PMD prescribed her 20mg of prednisone for 3 days and referred her to ortho. She was called back today to go to the emergency room after CBC showed elevated WBC count.  Wound consult requested to assist w/ management of wounds.  Pt sednetary & incontinent.  HHA using barrier  cream and diapers.   Pt sitting in recliner during day but is able to walk a little.  DSD placed awaiting consult. No drainage, odor, redness, warmth, pain, f/c/s noted. Drainage  managed by dressing, but dressing sticking. HHA & grandson at bedside.  All questions asked and answered to pt & family's satisfaction.     PAST MEDICAL & SURGICAL HISTORY:  Atherosclerotic cardiovascular disease  Spinal stenosis  Hyperlipidemia  Hypertension  s/p Lt hip replacement      REVIEW OF SYSTEMS    Skin/ MSK: see HPI  All other systems negative    MEDICATIONS  (STANDING):  amLODIPine   Tablet 2.5 milliGRAM(s) Oral daily  atorvastatin 40 milliGRAM(s) Oral at bedtime  carvedilol 6.25 milliGRAM(s) Oral every 12 hours  cefTRIAXone   IVPB      docusate sodium 100 milliGRAM(s) Oral three times a day  gabapentin 100 milliGRAM(s) Oral three times a day  heparin  Injectable 5000 Unit(s) SubCutaneous every 12 hours  senna 2 Tablet(s) Oral at bedtime    MEDICATIONS  (PRN):  acetaminophen   Tablet .. 650 milliGRAM(s) Oral every 6 hours PRN Mild Pain (1 - 3), Moderate Pain (4 - 6)  traMADol 25 milliGRAM(s) Oral every 6 hours PRN Severe Pain (7 - 10)      Allergies    penicillin (Unknown)    SOCIAL HISTORY:  ; (+)HHA;  Denies smoking, ETOH, drugs    FAMILY HISTORY:  No pertinent family history in first degree relatives: unable to obtain full fam hx from pt 2/2 dementia      Vital Signs Last 24 Hrs  T(C): 36.6 (05 Nov 2018 13:15), Max: 36.8 (05 Nov 2018 03:42)  T(F): 97.9 (05 Nov 2018 13:15), Max: 98.2 (05 Nov 2018 03:42)  HR: 60 (05 Nov 2018 13:15) (60 - 78)  BP: 135/60 (05 Nov 2018 13:15) (135/60 - 175/71)  BP(mean): --  RR: 18 (05 Nov 2018 13:15) (18 - 18)  SpO2: 96% (05 Nov 2018 13:15) (93% - 96%)    NAD /A&Ox3/   thin/ frail  WD/ WN/ WG  Versa Care P500 bed    Cardiovascular: RRR (+)m    Respiratory: CTA    Gastrointestinal soft NT/ND (+)BS      Neurology  weakened strength & sensation grossly intact    Musculoskeletal/Vascular:  ROM  >LE //BLE edema equal  DP/PT pulses palpable  BLE equally warm/ cool  no acute ischemia noted  hemosiderin staining  no deformities/ contractures    Skin:  frail,  ecchymosis w/o hematoma  serosanguinous drainage  No odor, erythema, increased warmth, tenderness, induration, fluctuance    LABS:  11-05    136  |  100  |  22  ----------------------------<  122<H>  3.7   |  24  |  0.97    Ca    10.2      05 Nov 2018 06:23  Phos  2.4     11-05  Mg     1.7     11-05                            11.3   14.10 )-----------( 355      ( 05 Nov 2018 08:20 )             34.8     PT/INR - ( 05 Nov 2018 07:55 )   PT: 12.7 sec;   INR: 1.11 ratio         PTT - ( 05 Nov 2018 07:55 )  PTT:27.2 sec      RADIOLOGY & ADDITIONAL STUDIES:  Cultures:    A/P:    Compression BLE  Consider NESTOR/PVR, XRay, Duplex, MRI, CT  BLE elevation  Abx per Medicine/ ID  Moisturize intact skin w/ SWEEN cream BID  con't Nutrition (as tolerated), Nutrition Consult  con't Offloading   con't Pericare  Care as per medicine will follow w/ you  Upon discharge f/u as outpatient at [facility] [address] [place] [address] [phone]  Seen w/ attng and D/w team  Thank you for this consult  Shannon Hidalgo PA-C CWS [id number] Wound SURGERY CONSULT NOTE    HPI:  Pt is a 89F with PMHx of mild dementia, CAD, HLD, HTN, spinal stenosis who p/w left hip pain and elevated WBC on outpatient labs. Pt presented to PMD's office yesterday with complaint of left hip pain that had been progressing for 5 days. She has a known sacral decubitus ulcer. The pain is localized to her left hip, is a constant dull ache. Sitting for prolonged period of time makes the pain worse. she denies numbness or tingling in her LLE. She had a total hip replacement "years" ago in [place], she cannot recall the name of her surgeon. She denies current or recent hx of F/C/N/V, CP/SOB, dysuria, constipation/diarrhea. No recent trauma, travel, or sick contacts. Her PMD prescribed her 20mg of prednisone for 3 days and referred her to ortho. She was called back today to go to the emergency room after CBC showed elevated WBC count.  Wound consult requested to assist w/ management of wounds.  Pt sednetary & incontinent.  HHA using barrier  cream and diapers.   Pt sitting in recliner during day but is able to walk a little.  DSD placed awaiting consult. No drainage, odor, redness, warmth, pain, f/c/s noted. Drainage  managed by dressing, but dressing sticking. HHA & grandson at bedside.  All questions asked and answered to pt & family's satisfaction.     PAST MEDICAL & SURGICAL HISTORY:  Atherosclerotic cardiovascular disease  Spinal stenosis  Hyperlipidemia  Hypertension  s/p Lt hip replacement      REVIEW OF SYSTEMS    Skin/ MSK: see HPI  All other systems negative    MEDICATIONS  (STANDING):  amLODIPine   Tablet 2.5 milliGRAM(s) Oral daily  atorvastatin 40 milliGRAM(s) Oral at bedtime  carvedilol 6.25 milliGRAM(s) Oral every 12 hours  cefTRIAXone   IVPB      docusate sodium 100 milliGRAM(s) Oral three times a day  gabapentin 100 milliGRAM(s) Oral three times a day  heparin  Injectable 5000 Unit(s) SubCutaneous every 12 hours  senna 2 Tablet(s) Oral at bedtime    MEDICATIONS  (PRN):  acetaminophen   Tablet .. 650 milliGRAM(s) Oral every 6 hours PRN Mild Pain (1 - 3), Moderate Pain (4 - 6)  traMADol 25 milliGRAM(s) Oral every 6 hours PRN Severe Pain (7 - 10)      Allergies    penicillin (Unknown)    SOCIAL HISTORY:  ; (+)HHA;  Denies smoking, ETOH, drugs    FAMILY HISTORY:  No pertinent family history in first degree relatives: unable to obtain full fam hx from pt 2/2 dementia      Vital Signs Last 24 Hrs  T(C): 36.6 (05 Nov 2018 13:15), Max: 36.8 (05 Nov 2018 03:42)  T(F): 97.9 (05 Nov 2018 13:15), Max: 98.2 (05 Nov 2018 03:42)  HR: 60 (05 Nov 2018 13:15) (60 - 78)  BP: 135/60 (05 Nov 2018 13:15) (135/60 - 175/71)  BP(mean): --  RR: 18 (05 Nov 2018 13:15) (18 - 18)  SpO2: 96% (05 Nov 2018 13:15) (93% - 96%)    NAD /A&Ox3/   thin/ frail  WD/ WN/ WG  Versa Care P500 bed    Cardiovascular: RRR (+)m    Respiratory: CTA    Gastrointestinal soft NT/ND (+)BS      Neurology  weakened strength & sensation grossly intact    Musculoskeletal/Vascular:  FROM x4  BLE edema equal  DP/PT pulses palpable  BLE equally warm  no acute ischemia noted  hemosiderin staining  Bilateral Heels w/ Evolving DTI w/ ruptured blistering and central area of purple DTI  Lt 2.2cm x 6cm x 0.1cm  Rt 3cm x 5cm x 0.1cm   No drainage  No odor, erythema, increased warmth, tenderness, induration, fluctuance    Skin:  frail,  ecchymosis w/o hematoma  Bilateral buttocks into gluteal cleft/ coccyx w/ hyperpigmented tissue and moist partial thickness skin loss  central area with DTI w/o blistering of 1.4cm x 1.5cm x 0cm  No drainage  No odor, erythema, increased warmth, tenderness, induration, fluctuance    LABS:  11-05    136  |  100  |  22  ----------------------------<  122<H>  3.7   |  24  |  0.97    Ca    10.2      05 Nov 2018 06:23  Phos  2.4     11-05  Mg     1.7     11-05                            11.3   14.10 )-----------( 355      ( 05 Nov 2018 08:20 )             34.8     PT/INR - ( 05 Nov 2018 07:55 )   PT: 12.7 sec;   INR: 1.11 ratio    PTT - ( 05 Nov 2018 07:55 )  PTT:27.2 sec      RADIOLOGY & ADDITIONAL STUDIES:    < from: Xray Femur 2 Views, Left (11.02.18 @ 21:39) >  FINDINGS:   BONES: Statuspost total hip arthroplasty with augmentation.  JOINTS: No dislocation.  SOFT TISSUES: Soft tissue swelling and a focus of subcutaneous emphysema   lateral hip soft tissues    IMPRESSION: Soft tissue edema and focus of subcutaneous emphysema lateral   to the hip on the frontal radiograph. This represents a soft tissue   infection.    < from: CT Chest No Cont (11.02.18 @ 19:47) >  FINDINGS:    CHEST:     LUNGS AND LARGE AIRWAYS: Patent central airways. 3.1 x 4.1 cm mass in the   posterior right upper lobe.  PLEURA: Trace bilateral pleural effusions.  VESSELS: Atherosclerotic aorta.  HEART: Heart size is normal. No pericardial effusion. Coronary artery   calcifications.  MEDIASTINUM AND AMY: No lymphadenopathy.  CHEST WALL AND LOWER NECK: Within normal limits.  VISUALIZED UPPER ABDOMEN: Rightrenal cyst.  BONES: Degenerative changes of the spine.    IMPRESSION: A mass in the peripheral right upper lobe is consistent with   primary lung cancer.          Cultures:  Culture - Blood (11.02.18 @ 17:28)    Specimen Source: .Blood Blood-Peripheral    Culture Results:   No growth to date.    Culture - Blood (11.02.18 @ 17:28)    -  Coagulase negative Staphylococcus: Detec    Gram Stain:   Growth in aerobic bottle: Gram Positive Cocci in Clusters    Specimen Source: .Blood Blood-Peripheral    Organism: Blood Culture PCR    Culture Results:   Growth in aerobic bottle: Coag Negative Staphylococcus "Susceptibilities  not performed"  "Due to technical problems, Proteus sp. will Not be reported as part of  the BCID panel until further notice"  ***Blood Panel PCR results on this specimen areavailable  approximately 3 hours after the Gram stain result.***  Gram stain, PCR, and/or culture results may not always  correspond due to difference in methodologies.  ************************************************************  This PCR assay wasperformed using [facility].  The following targets are tested for: Enterococcus,  vancomycin resistant enterococci, Listeria monocytogenes,  coagulase negative staphylococci, S. aureus,  methicillin resistant S. aureus, Streptococcus agalactiae  (Group B), S. pneumoniae, S. pyogenes (Group A),  Acinetobacter baumannii, Enterobacter cloacae, E. coli,  Klebsiella oxytoca, K. pneumoniae, Proteus sp.,  Serratia marcescens, Haemophilus influenzae,  Neisseria meningitidis, Pseudomonas aeruginosa, Candida  albicans, C. glabrata, C krusei, C parapsilosis,  C. tropicalis and the KPC resistance gene.    Organism Identification: Blood Culture PCR    Method Type: PCR      Culture - Body Fluid with Gram Stain (11.03.18 @ 17:20)    -  Penicillin: S Predicts results for ampicillin, amoxicillin, amoxicillin/clavulanate, ampicillin/sulbactam, 1st, 2nd and 3rd generation cephalosporins and carbapenems.    -  Vancomycin: S    Gram Stain:   Upon re-evaluation of gram stain:  Numerous polymorphonuclear leukocytes per low power field  Rare Gram positive cocci in pairs per oil power field    -  Clindamycin: S    Specimen Source: .Body Fluid Interstitial Fluid    Culture Results:   Moderate Streptococcus agalactiae (Group B)    Organism Identification: Streptococcus agalactiae (Group B)    Organism: Streptococcus agalactiae (Group B)    Method Type: INDIRA

## 2018-11-05 NOTE — DISCHARGE NOTE ADULT - CARE PROVIDER_API CALL
Sam Ceron; MBBS), Infectious Disease; Internal Medicine  [address] [place] [address]  Phone: [phone]  Fax: [phone]    Lauro Daniels), Diagnostic Radiology; [facility]  [address] [place] [address]  Phone: [phone]  Fax: [phone] Sam Ceron; MBBS), Infectious Disease; Internal Medicine  [address] [place] [address]  Phone: [phone]  Fax: [phone]    Lauro Daniels), Diagnostic Radiology; [facility]  [address] [place] [address]  Phone: [phone]  Fax: [phone]    Eddy Grissom), Internal Medicine; Nephrology  [address] [address]  Phone: [phone]  Fax: [phone]

## 2018-11-05 NOTE — PROGRESS NOTE ADULT - PROBLEM SELECTOR PLAN 4
Possibly secondary to suspected lung malignancy  - check vitd, pth, pthrp for now  - s/p 2.5L ns  - will pursue further workup Possibly secondary to suspected lung malignancy. Borderline.   - check vitd, pth, pthrp for now  - s/p 2.5L ns

## 2018-11-05 NOTE — DISCHARGE NOTE ADULT - ADDITIONAL INSTRUCTIONS
Please follow-up with your PMD and kidney doctor within 7 days after leaving the hospital.    Please follow-up with the Infectious Disease and Interventional Radiology doctors as they have instructed. Please follow-up with your PMD and within 7 days after leaving the hospital.    Please follow-up with the Infectious Disease and Interventional Radiology doctors as they have instructed.

## 2018-11-05 NOTE — PROGRESS NOTE ADULT - PROBLEM SELECTOR PLAN 2
Left hip abscess aspirated by IR (11/3) in setting of L hip hardware  -abscess culture grew GBS  -f/u ID recs  -will consider MRI pelvis Left hip abscess aspirated by IR (11/3) in setting of L hip hardware  -abscess culture grew GBS  -Will challenge w/ ceftriaxone 2 g IV daily (d/w family. Patient w/ distant reported PCN allergy w/ "hives". Close monitoring)

## 2018-11-05 NOTE — CONSULT NOTE ADULT - SUBJECTIVE AND OBJECTIVE BOX
Patient is a 89y old  Female who presents with a chief complaint of Osteomyelitis, Pulmonary mass (05 Nov 2018 09:02)    HPI:  Pt is a 89F with PMHx of mild dementia, CAD, HLD, HTN, spinal stenosis who p/w left hip pain and elevated WBC on outpatient labs. Pt presented to PMD's office yesterday with complaint of left hip pain that had been progressing for 5 days. She has a known sacral decubitus ulcer. The pain is localized to her left hip, is a constant dull ache. Sitting for prolonged period of time makes the pain worse. she denies numbness or tingling in her LLE. She had a total hip replacement "years" ago in [place], she cannot recall the name of her surgeon. She denies current or recent hx of F/C/N/V, CP/SOB, dysuria, constipation/diarrhea. No recent trauma, travel, or sick contacts. Her PMD prescribed her 20mg of prednisone for 3 days and referred her to ortho. She was called back today to go to the emergency room after CBC showed elevated WBC count.    In ED, initial VS were T98.2 HR60 /62 RR18 SaO2 94% on RA. Labs notable for WBC count of 20.1, creatinine of 1.4 (baseline 1.4 - 1.5) (02 Nov 2018 22:33)    Pt examined at bedside this am.  Pt underwent IR guided drainage of hip abscess on 11/3 now growing GBS, sensitivities pending.  Pt denies fevers or chills.  Pt still c/o hip pain this am.  Denies fevers, chills overnight.  Denies nausea or vomiting.    Of note, patient lives at home with daughter and pet dog.  Denies small children in household  [place] 2 [place] ago.      PAST MEDICAL & SURGICAL HISTORY:  Atherosclerotic cardiovascular disease  Spinal stenosis  Hyperlipidemia  Hypertension  History of left hip replacement      Social history:  Former smoker 1ppd x ~30 yrs; EtOH socially  Denies drug use  Denies sexual activity    FAMILY HISTORY:  No pertinent family history in first degree relatives: unable to obtain full fam hx from pt 2/2 dementia    Allergies    penicillin (Unknown):  Pt states she thinks she had a reaction, denies hives or closing of throat.    Intolerances        REVIEW OF SYSTEMS:    CONSTITUTIONAL: No weakness, fevers or chills  EYES/ENT: No visual changes;  No vertigo or throat pain   NECK: No pain or stiffness  RESPIRATORY: No cough, wheezing, hemoptysis; No shortness of breath  CARDIOVASCULAR: No chest pain or palpitations  GASTROINTESTINAL: No abdominal or epigastric pain. No nausea, vomiting, or hematemesis; No diarrhea or constipation. No melena or hematochezia.  GENITOURINARY: No dysuria, frequency or hematuria  NEUROLOGICAL: No numbness or weakness  SKIN: Open wound on left hip      Antimicrobials:    vancomycin  IVPB 1000 milliGRAM(s) IV Intermittent once      Vital Signs Last 24 Hrs  T(C): 36.8 (05 Nov 2018 03:42), Max: 36.8 (04 Nov 2018 13:50)  T(F): 98.2 (05 Nov 2018 03:42), Max: 98.2 (04 Nov 2018 13:50)  HR: 68 (05 Nov 2018 03:44) (62 - 93)  BP: 168/65 (05 Nov 2018 03:44) (105/61 - 175/71)  BP(mean): --  RR: 18 (05 Nov 2018 03:42) (17 - 18)  SpO2: 93% (05 Nov 2018 03:42) (91% - 99%)    11-04-18 @ 07:01  -  11-05-18 @ 07:00  --------------------------------------------------------  IN: 480 mL / OUT: 100 mL / NET: 380 mL    11-05-18 @ 07:01  -  11-05-18 @ 09:56  --------------------------------------------------------  IN: 0 mL / OUT: 75 mL / NET: -75 mL        Appearance: Sitting in bed, NAD  HEENT:   Normal oral mucosa, PERRL, EOMI, anicteric sclera  Cardiovascular: RRR, No murmurs, gallops or rubs appreciated  Respiratory: Lungs clear to auscultation bilaterally, no wheezes, crackles appreciated  Gastrointestinal:  Soft, nondistended, nontender, +BS	  Skin: Left hip with drain in place draining serosanguinous drainage with some purulence	  Neurologic: able to move LE minimally  Extremities: no edema noted.  Psych:  Normal mood and affect, responds to questions appropriately                          11.3   14.10 )-----------( 355      ( 05 Nov 2018 08:20 )             34.8       11-05    136  |  100  |  22  ----------------------------<  122<H>  3.7   |  24  |  0.97    Ca    10.2      05 Nov 2018 06:23  Phos  2.4     11-05  Mg     1.7     11-05        RECENT CULTURES:  11-03 @ 17:20  .Body Fluid Interstitial Fluid  --  --  --    Moderate Streptococcus agalactiae (Group B)  --  11-03 @ 08:38  .Urine Clean Catch (Midstream)  --  --  --    No growth  --  11-02 @ 17:28  .Blood Blood-Peripheral  Blood Culture PCR  Blood Culture PCR  PCR    Growth in aerobic bottle: Coag Negative Staphylococcus "Susceptibilities  not performed"  "Due to technical problems, Proteus sp. will Not be reported as part of  the BCID panel until further notice"  ***Blood Panel PCR results on this specimen areavailable  approximately 3 hours after the Gram stain result.***  Gram stain, PCR, and/or culture results may not always  correspond due to difference in methodologies.  ************************************************************  This PCR assay wasperformed using [employer].  The following targets are tested for: Enterococcus,  vancomycin resistant enterococci, Listeria monocytogenes,  coagulase negative staphylococci, S. aureus,  methicillin resistant S. aureus, Streptococcus agalactiae  (Group B), S. pneumoniae, S. pyogenes (Group A),  Acinetobacter baumannii, Enterobacter cloacae, E. coli,  Klebsiella oxytoca, K. pneumoniae, Proteus sp.,  Serratia marcescens, Haemophilus influenzae,  Neisseria meningitidis, Pseudomonas aeruginosa, Candida  albicans, C. glabrata, C krusei, C parapsilosis,  C. tropicalis and the KPC resistance gene.  --      MICROBIOLOGY:  Culture Results:   Moderate Streptococcus agalactiae (Group B) (11-03 @ 17:20)  Culture Results:   No growth (11-03 @ 08:38)  Culture Results:   Growth in aerobic bottle: Coag Negative Staphylococcus "Susceptibilities  not performed"  "Due to technical problems, Proteus sp. will Not be reported as part of  the BCID panel until further notice"  ***Blood Panel PCR results on this specimen areavailable  approximately 3 hours after the Gram stain result.***  Gram stain, PCR, and/or culture results may not always  correspond due to difference in methodologies.  ************************************************************  This PCR assay wasperformed using [employer].  The following targets are tested for: Enterococcus,  vancomycin resistant enterococci, Listeria monocytogenes,  coagulase negative staphylococci, S. aureus,  methicillin resistant S. aureus, Streptococcus agalactiae  (Group B), S. pneumoniae, S. pyogenes (Group A),  Acinetobacter baumannii, Enterobacter cloacae, E. coli,  Klebsiella oxytoca, K. pneumoniae, Proteus sp.,  Serratia marcescens, Haemophilus influenzae,  Neisseria meningitidis, Pseudomonas aeruginosa, Candida  albicans, C. glabrata, C krusei, C parapsilosis,  C. tropicalis and the KPC resistance gene. (11-02 @ 17:28)  Culture Results:   No growth to date. (11-02 @ 17:28)              Radiology: Patient is a 89y old  Female who presents with a chief complaint of Osteomyelitis, Pulmonary mass (05 Nov 2018 09:02)    HPI:  Pt is a 89F with PMHx of mild dementia, CAD, HLD, HTN, spinal stenosis who p/w left hip pain and elevated WBC on outpatient labs. Pt presented to PMD's office yesterday with complaint of left hip pain that had been progressing for 5 days. She has a known sacral decubitus ulcer. The pain is localized to her left hip, is a constant dull ache. Sitting for prolonged period of time makes the pain worse. she denies numbness or tingling in her LLE. She had a total hip replacement "years" ago in [place], she cannot recall the name of her surgeon. She denies current or recent hx of F/C/N/V, CP/SOB, dysuria, constipation/diarrhea. No recent trauma, travel, or sick contacts. Her PMD prescribed her 20mg of prednisone for 3 days and referred her to ortho. She was called back today to go to the emergency room after CBC showed elevated WBC count.    In ED, initial VS were T98.2 HR60 /62 RR18 SaO2 94% on RA. Labs notable for WBC count of 20.1, creatinine of 1.4 (baseline 1.4 - 1.5) (02 Nov 2018 22:33)    Pt examined at bedside this am.  Pt underwent IR guided drainage of hip abscess on 11/3 now growing GBS, sensitivities pending.  Pt denies fevers or chills.  Pt still c/o hip pain this am.  Denies fevers, chills overnight.  Denies nausea or vomiting.    Of note, patient lives at home with daughter and pet dog.  Denies small children in household  [place].      PAST MEDICAL & SURGICAL HISTORY:  Atherosclerotic cardiovascular disease  Spinal stenosis  Hyperlipidemia  Hypertension  History of left hip replacement      Social history:  Former smoker 1ppd x ~30 yrs; EtOH socially  Denies drug use  Denies sexual activity    FAMILY HISTORY:  No pertinent family history in first degree relatives: unable to obtain full fam hx from pt 2/2 dementia    Allergies    penicillin (Unknown):  Pt states she thinks she had a reaction, denies hives or closing of throat.    Intolerances        REVIEW OF SYSTEMS:    CONSTITUTIONAL: No weakness, fevers or chills  EYES/ENT: No visual changes;  No vertigo or throat pain   NECK: No pain or stiffness  RESPIRATORY: No cough, wheezing, hemoptysis; No shortness of breath  CARDIOVASCULAR: No chest pain or palpitations  GASTROINTESTINAL: No abdominal or epigastric pain. No nausea, vomiting, or hematemesis; No diarrhea or constipation. No melena or hematochezia.  GENITOURINARY: No dysuria, frequency or hematuria  NEUROLOGICAL: No numbness or weakness  SKIN: Open wound on left hip      Antimicrobials:    vancomycin  IVPB 1000 milliGRAM(s) IV Intermittent once      Vital Signs Last 24 Hrs  T(C): 36.8 (05 Nov 2018 03:42), Max: 36.8 (04 Nov 2018 13:50)  T(F): 98.2 (05 Nov 2018 03:42), Max: 98.2 (04 Nov 2018 13:50)  HR: 68 (05 Nov 2018 03:44) (62 - 93)  BP: 168/65 (05 Nov 2018 03:44) (105/61 - 175/71)  BP(mean): --  RR: 18 (05 Nov 2018 03:42) (17 - 18)  SpO2: 93% (05 Nov 2018 03:42) (91% - 99%)    11-04-18 @ 07:01  -  11-05-18 @ 07:00  --------------------------------------------------------  IN: 480 mL / OUT: 100 mL / NET: 380 mL    11-05-18 @ 07:01  -  11-05-18 @ 09:56  --------------------------------------------------------  IN: 0 mL / OUT: 75 mL / NET: -75 mL        Appearance: Sitting in bed, NAD  HEENT:   Normal oral mucosa, PERRL, EOMI, anicteric sclera  Cardiovascular: RRR, No murmurs, gallops or rubs appreciated  Respiratory: Lungs clear to auscultation bilaterally, no wheezes, crackles appreciated  Gastrointestinal:  Soft, nondistended, nontender, +BS	  Skin: Left hip with drain in place draining serosanguinous drainage with some purulence	  Stage II decubitus ulcer on sacrum  Neurologic: able to move LE minimally  Extremities: no edema noted.  Psych:  Normal mood and affect, responds to questions appropriately                          11.3   14.10 )-----------( 355      ( 05 Nov 2018 08:20 )             34.8       11-05    136  |  100  |  22  ----------------------------<  122<H>  3.7   |  24  |  0.97    Ca    10.2      05 Nov 2018 06:23  Phos  2.4     11-05  Mg     1.7     11-05        RECENT CULTURES:  11-03 @ 17:20  .Body Fluid Interstitial Fluid  --  --  --    Moderate Streptococcus agalactiae (Group B)  --  11-03 @ 08:38  .Urine Clean Catch (Midstream)  --  --  --    No growth  --  11-02 @ 17:28  .Blood Blood-Peripheral  Blood Culture PCR  Blood Culture PCR  PCR    Growth in aerobic bottle: Coag Negative Staphylococcus "Susceptibilities  not performed"  "Due to technical problems, Proteus sp. will Not be reported as part of  the BCID panel until further notice"  ***Blood Panel PCR results on this specimen areavailable  approximately 3 hours after the Gram stain result.***  Gram stain, PCR, and/or culture results may not always  correspond due to difference in methodologies.  ************************************************************  This PCR assay wasperformed using [employer].  The following targets are tested for: Enterococcus,  vancomycin resistant enterococci, Listeria monocytogenes,  coagulase negative staphylococci, S. aureus,  methicillin resistant S. aureus, Streptococcus agalactiae  (Group B), S. pneumoniae, S. pyogenes (Group A),  Acinetobacter baumannii, Enterobacter cloacae, E. coli,  Klebsiella oxytoca, K. pneumoniae, Proteus sp.,  Serratia marcescens, Haemophilus influenzae,  Neisseria meningitidis, Pseudomonas aeruginosa, Candida  albicans, C. glabrata, C krusei, C parapsilosis,  C. tropicalis and the KPC resistance gene.  --      MICROBIOLOGY:  Culture Results:   Moderate Streptococcus agalactiae (Group B) (11-03 @ 17:20)  Culture Results:   No growth (11-03 @ 08:38)  Culture Results:   Growth in aerobic bottle: Coag Negative Staphylococcus "Susceptibilities  not performed"  "Due to technical problems, Proteus sp. will Not be reported as part of  the BCID panel until further notice"  ***Blood Panel PCR results on this specimen areavailable  approximately 3 hours after the Gram stain result.***  Gram stain, PCR, and/or culture results may not always  correspond due to difference in methodologies.  ************************************************************  This PCR assay wasperformed using [employer].  The following targets are tested for: Enterococcus,  vancomycin resistant enterococci, Listeria monocytogenes,  coagulase negative staphylococci, S. aureus,  methicillin resistant S. aureus, Streptococcus agalactiae  (Group B), S. pneumoniae, S. pyogenes (Group A),  Acinetobacter baumannii, Enterobacter cloacae, E. coli,  Klebsiella oxytoca, K. pneumoniae, Proteus sp.,  Serratia marcescens, Haemophilus influenzae,  Neisseria meningitidis, Pseudomonas aeruginosa, Candida  albicans, C. glabrata, C krusei, C parapsilosis,  C. tropicalis and the KPC resistance gene. (11-02 @ 17:28)  Culture Results:   No growth to date. (11-02 @ 17:28)              Radiology: Patient is a 89y old  Female who presents with a chief complaint of Osteomyelitis, Pulmonary mass (05 Nov 2018 09:02)    HPI:  Pt is a 89F with PMHx of mild dementia, CAD, HLD, HTN, spinal stenosis who p/w left hip pain and elevated WBC on outpatient labs. Pt presented to PMD's office yesterday with complaint of left hip pain that had been progressing for 5 days. She has a known sacral decubitus ulcer. The pain is localized to her left hip, is a constant dull ache. Sitting for prolonged period of time makes the pain worse. she denies numbness or tingling in her LLE. She had a total hip replacement "years" ago in [place], she cannot recall the name of her surgeon. She denies current or recent hx of F/C/N/V, CP/SOB, dysuria, constipation/diarrhea. No recent trauma, travel, or sick contacts. Her PMD prescribed her 20mg of prednisone for 3 days and referred her to ortho. She was called back today to go to the emergency room after CBC showed elevated WBC count.    In ED, initial VS were T98.2 HR60 /62 RR18 SaO2 94% on RA. Labs notable for WBC count of 20.1, creatinine of 1.4 (baseline 1.4 - 1.5) (02 Nov 2018 22:33)    Pt examined at bedside this am.  Pt underwent IR guided drainage of hip abscess on 11/3 now growing GBS, sensitivities pending.  Pt denies fevers or chills.  Pt still c/o hip pain this am.  Denies fevers, chills overnight.  Denies nausea or vomiting.    Of note, patient lives at home with daughter and pet dog.  Denies small children in household  [place].      PAST MEDICAL & SURGICAL HISTORY:  Atherosclerotic cardiovascular disease  Spinal stenosis  Hyperlipidemia  Hypertension  History of left hip replacement      Social history:  Former smoker 1ppd x ~30 yrs; EtOH socially  Denies drug use  Denies sexual activity    FAMILY HISTORY:  No pertinent family history in first degree relatives: unable to obtain full fam hx from pt 2/2 dementia    Allergies    penicillin (Unknown):  Pt states she thinks she had a reaction, denies hives or closing of throat.    Intolerances        REVIEW OF SYSTEMS:    CONSTITUTIONAL: No weakness, fevers or chills  EYES/ENT: No visual changes;  No vertigo or throat pain   NECK: No pain or stiffness  RESPIRATORY: No cough, wheezing, hemoptysis; No shortness of breath  CARDIOVASCULAR: No chest pain or palpitations  GASTROINTESTINAL: No abdominal or epigastric pain. No nausea, vomiting, or hematemesis; No diarrhea or constipation. No melena or hematochezia.  GENITOURINARY: No dysuria, frequency or hematuria  NEUROLOGICAL: No numbness or weakness  SKIN: Open wound on left hip      Antimicrobials:    vancomycin  IVPB 1000 milliGRAM(s) IV Intermittent once      Vital Signs Last 24 Hrs  T(C): 36.8 (05 Nov 2018 03:42), Max: 36.8 (04 Nov 2018 13:50)  T(F): 98.2 (05 Nov 2018 03:42), Max: 98.2 (04 Nov 2018 13:50)  HR: 68 (05 Nov 2018 03:44) (62 - 93)  BP: 168/65 (05 Nov 2018 03:44) (105/61 - 175/71)  BP(mean): --  RR: 18 (05 Nov 2018 03:42) (17 - 18)  SpO2: 93% (05 Nov 2018 03:42) (91% - 99%)    11-04-18 @ 07:01  -  11-05-18 @ 07:00  --------------------------------------------------------  IN: 480 mL / OUT: 100 mL / NET: 380 mL    11-05-18 @ 07:01  -  11-05-18 @ 09:56  --------------------------------------------------------  IN: 0 mL / OUT: 75 mL / NET: -75 mL        Appearance: Sitting in bed, NAD  HEENT:   Normal oral mucosa, PERRL, EOMI, anicteric sclera  Cardiovascular: RRR, No murmurs, gallops or rubs appreciated  Respiratory: Lungs clear to auscultation bilaterally, no wheezes, crackles appreciated  Gastrointestinal:  Soft, nondistended, nontender, +BS	  Skin: Left hip with drain in place draining serosanguinous drainage with some purulence	  Stage II decubitus ulcer on sacrum  Neurologic: able to move LE minimally  Extremities: no edema noted.  Psych:  Normal mood and affect, responds to questions appropriately                          11.3   14.10 )-----------( 355      ( 05 Nov 2018 08:20 )             34.8       11-05    136  |  100  |  22  ----------------------------<  122<H>  3.7   |  24  |  0.97    Ca    10.2      05 Nov 2018 06:23  Phos  2.4     11-05  Mg     1.7     11-05        RECENT CULTURES:  11-03 @ 17:20  .Body Fluid Interstitial Fluid  --  --  --    Moderate Streptococcus agalactiae (Group B)  --  11-03 @ 08:38  .Urine Clean Catch (Midstream)  --  --  --    No growth  --  11-02 @ 17:28  .Blood Blood-Peripheral  Blood Culture PCR  Blood Culture PCR  PCR    Growth in aerobic bottle: Coag Negative Staphylococcus "Susceptibilities  not performed"  "Due to technical problems, Proteus sp. will Not be reported as part of  the BCID panel until further notice"  ***Blood Panel PCR results on this specimen areavailable  approximately 3 hours after the Gram stain result.***  Gram stain, PCR, and/or culture results may not always  correspond due to difference in methodologies.  ************************************************************  This PCR assay wasperformed using [employer].  The following targets are tested for: Enterococcus,  vancomycin resistant enterococci, Listeria monocytogenes,  coagulase negative staphylococci, S. aureus,  methicillin resistant S. aureus, Streptococcus agalactiae  (Group B), S. pneumoniae, S. pyogenes (Group A),  Acinetobacter baumannii, Enterobacter cloacae, E. coli,  Klebsiella oxytoca, K. pneumoniae, Proteus sp.,  Serratia marcescens, Haemophilus influenzae,  Neisseria meningitidis, Pseudomonas aeruginosa, Candida  albicans, C. glabrata, C krusei, C parapsilosis,  C. tropicalis and the KPC resistance gene.  --      MICROBIOLOGY:  Culture Results:   Moderate Streptococcus agalactiae (Group B) (11-03 @ 17:20)  Culture Results:   No growth (11-03 @ 08:38)  Culture Results:   Growth in aerobic bottle: Coag Negative Staphylococcus "Susceptibilities  not performed"  "Due to technical problems, Proteus sp. will Not be reported as part of  the BCID panel until further notice"  ***Blood Panel PCR results on this specimen areavailable  approximately 3 hours after the Gram stain result.***  Gram stain, PCR, and/or culture results may not always  correspond due to difference in methodologies.  ************************************************************  This PCR assay wasperformed using [employer].  The following targets are tested for: Enterococcus,  vancomycin resistant enterococci, Listeria monocytogenes,  coagulase negative staphylococci, S. aureus,  methicillin resistant S. aureus, Streptococcus agalactiae  (Group B), S. pneumoniae, S. pyogenes (Group A),  Acinetobacter baumannii, Enterobacter cloacae, E. coli,  Klebsiella oxytoca, K. pneumoniae, Proteus sp.,  Serratia marcescens, Haemophilus influenzae,  Neisseria meningitidis, Pseudomonas aeruginosa, Candida  albicans, C. glabrata, C krusei, C parapsilosis,  C. tropicalis and the KPC resistance gene. (11-02 @ 17:28)  Culture Results:   No growth to date. (11-02 @ 17:28)          Radiology:    Xray Femur 2 Views, Left (11.02.18 @ 21:39) >  Soft tissue edema and focus of subcutaneous emphysema lateral   to the hip on the frontal radiograph. This represents a soft tissue   infection.      CT Chest No Cont (11.02.18 @ 19:47) >  A mass in the peripheral right upper lobe is consistent with   primary lung cancer.

## 2018-11-05 NOTE — DISCHARGE NOTE ADULT - NS AS ACTIVITY OBS
Walking-Outdoors allowed/Do not make important decisions/Do not drive or operate machinery/Bathing allowed/Walking-Indoors allowed/No Heavy lifting/straining

## 2018-11-05 NOTE — PROGRESS NOTE ADULT - PROBLEM SELECTOR PLAN 3
New finding on CT chest concerning for primary lung CA, unclear if pt would want further workup. Attempts to reach daughter unsuccessful. Will need discussion with daughter who is HCP.  - pt requests further workup. will consult onc/CTS after principal issue addressed. New finding on CT chest concerning for primary lung CA. Extensive d/w patient's grandson (HCP are daughters, Erin and Manuela, who are not present). Unlikely would want further workup of lung mass, will further discuss as family.   - Pt DNR/DNI

## 2018-11-06 LAB
ANION GAP SERPL CALC-SCNC: 11 MMOL/L — SIGNIFICANT CHANGE UP (ref 5–17)
APTT BLD: 26.5 SEC — LOW (ref 27.5–36.3)
BASOPHILS # BLD AUTO: 0.02 K/UL — SIGNIFICANT CHANGE UP (ref 0–0.2)
BASOPHILS NFR BLD AUTO: 0.2 % — SIGNIFICANT CHANGE UP (ref 0–2)
BUN SERPL-MCNC: 18 MG/DL — SIGNIFICANT CHANGE UP (ref 7–23)
CALCIUM SERPL-MCNC: 9.8 MG/DL — SIGNIFICANT CHANGE UP (ref 8.4–10.5)
CHLORIDE SERPL-SCNC: 105 MMOL/L — SIGNIFICANT CHANGE UP (ref 96–108)
CO2 SERPL-SCNC: 22 MMOL/L — SIGNIFICANT CHANGE UP (ref 22–31)
CREAT SERPL-MCNC: 0.97 MG/DL — SIGNIFICANT CHANGE UP (ref 0.5–1.3)
EOSINOPHIL # BLD AUTO: 0.27 K/UL — SIGNIFICANT CHANGE UP (ref 0–0.5)
EOSINOPHIL NFR BLD AUTO: 2.2 % — SIGNIFICANT CHANGE UP (ref 0–6)
GLUCOSE SERPL-MCNC: 128 MG/DL — HIGH (ref 70–99)
HCT VFR BLD CALC: 32 % — LOW (ref 34.5–45)
HGB BLD-MCNC: 10 G/DL — LOW (ref 11.5–15.5)
IMM GRANULOCYTES NFR BLD AUTO: 0.3 % — SIGNIFICANT CHANGE UP (ref 0–1.5)
INR BLD: 1.14 RATIO — SIGNIFICANT CHANGE UP (ref 0.88–1.16)
LYMPHOCYTES # BLD AUTO: 1.47 K/UL — SIGNIFICANT CHANGE UP (ref 1–3.3)
LYMPHOCYTES # BLD AUTO: 12 % — LOW (ref 13–44)
MAGNESIUM SERPL-MCNC: 1.7 MG/DL — SIGNIFICANT CHANGE UP (ref 1.6–2.6)
MCHC RBC-ENTMCNC: 27.2 PG — SIGNIFICANT CHANGE UP (ref 27–34)
MCHC RBC-ENTMCNC: 31.3 GM/DL — LOW (ref 32–36)
MCV RBC AUTO: 87.2 FL — SIGNIFICANT CHANGE UP (ref 80–100)
MONOCYTES # BLD AUTO: 0.75 K/UL — SIGNIFICANT CHANGE UP (ref 0–0.9)
MONOCYTES NFR BLD AUTO: 6.1 % — SIGNIFICANT CHANGE UP (ref 2–14)
NEUTROPHILS # BLD AUTO: 9.71 K/UL — HIGH (ref 1.8–7.4)
NEUTROPHILS NFR BLD AUTO: 79.2 % — HIGH (ref 43–77)
PHOSPHATE SERPL-MCNC: 2.6 MG/DL — SIGNIFICANT CHANGE UP (ref 2.5–4.5)
PLATELET # BLD AUTO: 382 K/UL — SIGNIFICANT CHANGE UP (ref 150–400)
POTASSIUM SERPL-MCNC: 3.9 MMOL/L — SIGNIFICANT CHANGE UP (ref 3.5–5.3)
POTASSIUM SERPL-SCNC: 3.9 MMOL/L — SIGNIFICANT CHANGE UP (ref 3.5–5.3)
PROTHROM AB SERPL-ACNC: 12.8 SEC — SIGNIFICANT CHANGE UP (ref 10–13.1)
RBC # BLD: 3.67 M/UL — LOW (ref 3.8–5.2)
RBC # FLD: 15.5 % — HIGH (ref 10.3–14.5)
SODIUM SERPL-SCNC: 138 MMOL/L — SIGNIFICANT CHANGE UP (ref 135–145)
WBC # BLD: 12.26 K/UL — HIGH (ref 3.8–10.5)
WBC # FLD AUTO: 12.26 K/UL — HIGH (ref 3.8–10.5)

## 2018-11-06 PROCEDURE — 99232 SBSQ HOSP IP/OBS MODERATE 35: CPT | Mod: GC

## 2018-11-06 PROCEDURE — 72148 MRI LUMBAR SPINE W/O DYE: CPT | Mod: 26

## 2018-11-06 PROCEDURE — 99233 SBSQ HOSP IP/OBS HIGH 50: CPT | Mod: GC

## 2018-11-06 RX ADMIN — AMLODIPINE BESYLATE 2.5 MILLIGRAM(S): 2.5 TABLET ORAL at 05:17

## 2018-11-06 RX ADMIN — GABAPENTIN 100 MILLIGRAM(S): 400 CAPSULE ORAL at 21:44

## 2018-11-06 RX ADMIN — HEPARIN SODIUM 5000 UNIT(S): 5000 INJECTION INTRAVENOUS; SUBCUTANEOUS at 18:12

## 2018-11-06 RX ADMIN — CEFTRIAXONE 100 GRAM(S): 500 INJECTION, POWDER, FOR SOLUTION INTRAMUSCULAR; INTRAVENOUS at 14:28

## 2018-11-06 RX ADMIN — Medication 100 MILLIGRAM(S): at 21:44

## 2018-11-06 RX ADMIN — HEPARIN SODIUM 5000 UNIT(S): 5000 INJECTION INTRAVENOUS; SUBCUTANEOUS at 05:17

## 2018-11-06 RX ADMIN — SENNA PLUS 2 TABLET(S): 8.6 TABLET ORAL at 21:44

## 2018-11-06 RX ADMIN — GABAPENTIN 100 MILLIGRAM(S): 400 CAPSULE ORAL at 05:17

## 2018-11-06 RX ADMIN — ATORVASTATIN CALCIUM 40 MILLIGRAM(S): 80 TABLET, FILM COATED ORAL at 21:44

## 2018-11-06 RX ADMIN — CARVEDILOL PHOSPHATE 6.25 MILLIGRAM(S): 80 CAPSULE, EXTENDED RELEASE ORAL at 05:17

## 2018-11-06 RX ADMIN — Medication 100 MILLIGRAM(S): at 05:17

## 2018-11-06 RX ADMIN — GABAPENTIN 100 MILLIGRAM(S): 400 CAPSULE ORAL at 14:28

## 2018-11-06 RX ADMIN — Medication 100 MILLIGRAM(S): at 14:28

## 2018-11-06 RX ADMIN — CARVEDILOL PHOSPHATE 6.25 MILLIGRAM(S): 80 CAPSULE, EXTENDED RELEASE ORAL at 18:12

## 2018-11-06 NOTE — PROGRESS NOTE ADULT - PROBLEM SELECTOR PLAN 1
Problem: Osteomyelitis.  Plan: Pt with elevated WBC, ESR, CRP, and CT a/p showing concern for coccygeal osteomyelitis, most likely 2/2 known sacral decubitus ulcer.   -Ceftriaxone 2g IV daily for 6 weeks. PICC placement when BCX clear x 48 hours  -BC grew CoNS, Body fluid culture grew GBS. BCx2 reordered (11/5) due to possible contaminant.  -Appreciate ortho recs, WBAT  -fall precautions  -incentive spirometry  -Check MRI L/S spine.

## 2018-11-06 NOTE — PROGRESS NOTE ADULT - ASSESSMENT
89F with PMHx of mild dementia, CAD, HLD, HTN, spinal stenosis who p/w left hip pain and elevated WBC on outpatient labs found to have likely osteomyelitis of sacrum, large fluid collection in left hip, and an incidental new RUL lung mass concerning for primary lung CA.

## 2018-11-06 NOTE — PROGRESS NOTE ADULT - SUBJECTIVE AND OBJECTIVE BOX
INFECTIOUS DISEASES FOLLOW UP    This is a follow up note for this  89yFemale with  Cellulitis of buttock    Pt examined at bedside this am.  Denies fevers, chills, sob overnight.  No acute events overnight.    ROS:  CONSTITUTIONAL:  No fever, chills  CARDIOVASCULAR:  No chest pain, palpitations  RESPIRATORY:  No dyspnea, cough  GASTROINTESTINAL:  No nausea, vomiting, diarrhea, or abdominal pain  GENITOURINARY:  No dysuria, frequency   NEUROLOGIC:  No headache    Allergies    penicillin (Unknown)    Intolerances        ANTIBIOTICS/RELEVANT:  antimicrobials  cefTRIAXone   IVPB      cefTRIAXone   IVPB 2 Gram(s) IV Intermittent every 24 hours    immunologic:    OTHER:  acetaminophen   Tablet .. 650 milliGRAM(s) Oral every 6 hours PRN  amLODIPine   Tablet 2.5 milliGRAM(s) Oral daily  atorvastatin 40 milliGRAM(s) Oral at bedtime  carvedilol 6.25 milliGRAM(s) Oral every 12 hours  docusate sodium 100 milliGRAM(s) Oral three times a day  gabapentin 100 milliGRAM(s) Oral three times a day  heparin  Injectable 5000 Unit(s) SubCutaneous every 12 hours  senna 2 Tablet(s) Oral at bedtime  traMADol 25 milliGRAM(s) Oral every 6 hours PRN      Objective:  Vital Signs Last 24 Hrs  T(C): 36.7 (06 Nov 2018 12:11), Max: 37.5 (05 Nov 2018 22:16)  T(F): 98.1 (06 Nov 2018 12:11), Max: 99.5 (05 Nov 2018 22:16)  HR: 72 (06 Nov 2018 12:11) (60 - 72)  BP: 121/60 (06 Nov 2018 12:11) (121/60 - 149/79)  BP(mean): --  RR: 18 (06 Nov 2018 12:11) (18 - 20)  SpO2: 96% (06 Nov 2018 12:11) (95% - 96%)    PHYSICAL EXAM:  Constitutional: NAD; sitting in chair.  Eyes: KLEVER, EOMI	  Respiratory: CTABL  Cardiovascular: normal S1/S2, no murmurs, rubs, or gallops  Gastrointestinal:soft, (+) BS, no tenderness  Extremities: 2+ peripheral pulses  TRES drain in place on L hip, serosanguinous drainage  No Lymphadenopathy  IV sites not inflammed.    LABS:                        10.0   12.26 )-----------( 382      ( 06 Nov 2018 08:02 )             32.0     11-06    138  |  105  |  18  ----------------------------<  128<H>  3.9   |  22  |  0.97    Ca    9.8      06 Nov 2018 06:43  Phos  2.6     11-06  Mg     1.7     11-06      PT/INR - ( 06 Nov 2018 08:14 )   PT: 12.8 sec;   INR: 1.14 ratio         PTT - ( 06 Nov 2018 08:14 )  PTT:26.5 sec      MICROBIOLOGY:            RECENT CULTURES:  11-03 @ 17:20  .Body Fluid Interstitial Fluid  Streptococcus agalactiae (Group B)  Streptococcus agalactiae (Group B)  KB    Moderate Streptococcus agalactiae (Group B)  --  11-03 @ 08:38  .Urine Clean Catch (Midstream)  --  --  --    No growth  --  11-02 @ 17:28  .Blood Blood-Peripheral  Blood Culture PCR  Blood Culture PCR  PCR    Growth in aerobic bottle: Coag Negative Staphylococcus "Susceptibilities  not performed"  "Due to technical problems, Proteus sp. will Not be reported as part of  the BCID panel until further notice"  ***Blood Panel PCR results on this specimen areavailable  approximately 3 hours after the Gram stain result.***  Gram stain, PCR, and/or culture results may not always  correspond due to difference in methodologies.  ************************************************************  This PCR assay wasperformed using [employer].  The following targets are tested for: Enterococcus,  vancomycin resistant enterococci, Listeria monocytogenes,  coagulase negative staphylococci, S. aureus,  methicillin resistant S. aureus, Streptococcus agalactiae  (Group B), S. pneumoniae, S. pyogenes (Group A),  Acinetobacter baumannii, Enterobacter cloacae, E. coli,  Klebsiella oxytoca, K. pneumoniae, Proteus sp.,  Serratia marcescens, Haemophilus influenzae,  Neisseria meningitidis, Pseudomonas aeruginosa, Candida  albicans, C. glabrata, C krusei, C parapsilosis,  C. tropicalis and the KPC resistance gene.  --      RADIOLOGY & ADDITIONAL STUDIES: INFECTIOUS DISEASES FOLLOW UP    This is a follow up note for this  89yFemale with  Cellulitis of buttock    Pt examined at bedside this am.  Denies fevers, chills, sob overnight.  No acute events overnight.    ROS:  CONSTITUTIONAL:  No fever, chills  CARDIOVASCULAR:  No chest pain, palpitations  RESPIRATORY:  No dyspnea, cough  GASTROINTESTINAL:  No nausea, vomiting, diarrhea, or abdominal pain  GENITOURINARY:  No dysuria, frequency   NEUROLOGIC:  No headache    Allergies    penicillin (Unknown)    Intolerances        ANTIBIOTICS/RELEVANT:  antimicrobials  cefTRIAXone   IVPB      cefTRIAXone   IVPB 2 Gram(s) IV Intermittent every 24 hours    immunologic:    OTHER:  acetaminophen   Tablet .. 650 milliGRAM(s) Oral every 6 hours PRN  amLODIPine   Tablet 2.5 milliGRAM(s) Oral daily  atorvastatin 40 milliGRAM(s) Oral at bedtime  carvedilol 6.25 milliGRAM(s) Oral every 12 hours  docusate sodium 100 milliGRAM(s) Oral three times a day  gabapentin 100 milliGRAM(s) Oral three times a day  heparin  Injectable 5000 Unit(s) SubCutaneous every 12 hours  senna 2 Tablet(s) Oral at bedtime  traMADol 25 milliGRAM(s) Oral every 6 hours PRN      Objective:  Vital Signs Last 24 Hrs  T(C): 36.7 (06 Nov 2018 12:11), Max: 37.5 (05 Nov 2018 22:16)  T(F): 98.1 (06 Nov 2018 12:11), Max: 99.5 (05 Nov 2018 22:16)  HR: 72 (06 Nov 2018 12:11) (60 - 72)  BP: 121/60 (06 Nov 2018 12:11) (121/60 - 149/79)  BP(mean): --  RR: 18 (06 Nov 2018 12:11) (18 - 20)  SpO2: 96% (06 Nov 2018 12:11) (95% - 96%)    PHYSICAL EXAM:  Constitutional: NAD; sitting in chair.  Eyes: KLEVER, EOMI	  Respiratory: CTABL  Cardiovascular: normal S1/S2, no murmurs, rubs, or gallops  Gastrointestinal:soft, (+) BS, no tenderness  Extremities: 2+ peripheral pulses  TRES drain in place on L hip, serosanguinous drainage  No Lymphadenopathy  IV sites not inflammed.    LABS:                        10.0   12.26 )-----------( 382      ( 06 Nov 2018 08:02 )             32.0     11-06    138  |  105  |  18  ----------------------------<  128<H>  3.9   |  22  |  0.97    Ca    9.8      06 Nov 2018 06:43  Phos  2.6     11-06  Mg     1.7     11-06      PT/INR - ( 06 Nov 2018 08:14 )   PT: 12.8 sec;   INR: 1.14 ratio         PTT - ( 06 Nov 2018 08:14 )  PTT:26.5 sec      MICROBIOLOGY:            RECENT CULTURES:  11-03 @ 17:20  .Body Fluid Interstitial Fluid  Streptococcus agalactiae (Group B)  Streptococcus agalactiae (Group B)  KB    Moderate Streptococcus agalactiae (Group B)  --  11-03 @ 08:38  .Urine Clean Catch (Midstream)  --  --  --    No growth  --  11-02 @ 17:28  .Blood Blood-Peripheral  Blood Culture PCR  Blood Culture PCR  PCR    Growth in aerobic bottle: Coag Negative Staphylococcus "Susceptibilities  not performed"  "Due to technical problems, Proteus sp. will Not be reported as part of  the BCID panel until further notice"  ***Blood Panel PCR results on this specimen areavailable  approximately 3 hours after the Gram stain result.***  Gram stain, PCR, and/or culture results may not always  correspond due to difference in methodologies.  ************************************************************  This PCR assay wasperformed using [employer].  The following targets are tested for: Enterococcus,  vancomycin resistant enterococci, Listeria monocytogenes,  coagulase negative staphylococci, S. aureus,  methicillin resistant S. aureus, Streptococcus agalactiae  (Group B), S. pneumoniae, S. pyogenes (Group A),  Acinetobacter baumannii, Enterobacter cloacae, E. coli,  Klebsiella oxytoca, K. pneumoniae, Proteus sp.,  Serratia marcescens, Haemophilus influenzae,  Neisseria meningitidis, Pseudomonas aeruginosa, Candida  albicans, C. glabrata, C krusei, C parapsilosis,  C. tropicalis and the KPC resistance gene.  --      RADIOLOGY & ADDITIONAL STUDIES:  < from: MR Lumbar Spine No Cont (11.06.18 @ 13:57) >  No marrow edema or abnormal prevertebral/paravertebral soft tissue. No   intradiscal edema. No evidence for discitis/ostium myelitis.    Degenerative changes. Patient is a 89y old  Female who presents with a chief complaint of L hip pain (06 Nov 2018 13:07)      INTERVAL HPI/OVERNIGHT EVENTS: NARAYAN overnight. Continues to tolerate Rocephin well w/out rash, erythema, or dyspnea. Pain continues to decreased from L hip with serosanguineous output from drain. 420mL output on 11/6. Denies F/C/NS/N/V/D/CP/Palpitations/SOB/Cough. BM last night.    MEDICATIONS (STANDING):  amLODIPine   Tablet 2.5 milliGRAM(s) Oral daily  atorvastatin 40 milliGRAM(s) Oral at bedtime  carvedilol 6.25 milliGRAM(s) Oral every 12 hours  cefTRIAXone   IVPB      cefTRIAXone   IVPB 2 Gram(s) IV Intermittent every 24 hours  docusate sodium 100 milliGRAM(s) Oral three times a day  gabapentin 100 milliGRAM(s) Oral three times a day  heparin  Injectable 5000 Unit(s) SubCutaneous every 12 hours  senna 2 Tablet(s) Oral at bedtime    MEDICATIONS  (PRN):  acetaminophen   Tablet .. 650 milliGRAM(s) Oral every 6 hours PRN  traMADol 25 milliGRAM(s) Oral every 6 hours PRN      REVIEW OF SYSTEMS:  CONSTITUTIONAL: No fever, weight loss, or fatigue  EYES: No eye pain, visual disturbances, or discharge  ENMT:  Difficulty hearing but no tinnitus, vertigo; No sinus or throat pain  NECK: No pain or stiffness  RESPIRATORY: No cough, wheezing, chills or hemoptysis; No shortness of breath  CARDIOVASCULAR: No chest pain, palpitations, dizziness, or leg swelling  GASTROINTESTINAL: No abdominal or epigastric pain. No nausea, vomiting, or hematemesis; No diarrhea or constipation. No melena or hematochezia.  GENITOURINARY: No dysuria, frequency, hematuria, or incontinence  NEUROLOGICAL: No headaches, memory loss, loss of strength, numbness, or tremors  SKIN: No itching, burning, rashes, or lesions   MUSCULOSKELETAL: No joint pain or swelling; No muscle, back, or extremity pain    T(F): 98.6 (11-07-18 @ 04:37), Max: 99.5 (11-06-18 @ 20:41)  HR: 72 (11-07-18 @ 04:37) (72 - 75)  BP: 153/60 (11-07-18 @ 04:37) (121/60 - 153/60)  RR: 19 (11-07-18 @ 04:37) (17 - 19)  SpO2: 95% (11-07-18 @ 04:37) (94% - 96%)  Wt(kg): --  CAPILLARY BLOOD GLUCOSE        I&O's Summary    06 Nov 2018 07:01  -  07 Nov 2018 07:00  --------------------------------------------------------  IN: 1220 mL / OUT: 2200 mL / NET: -980 mL        PHYSICAL EXAM:  GENERAL: NAD, well-groomed, well-developed  HEAD:  Atraumatic, Normocephalic  EYES: EOMI, PERRLA, conjunctiva and sclera clear  ENMT: No tonsillar erythema, exudates, or enlargement; Moist mucous membranes  NECK: Supple, No JVD, Normal thyroid  NERVOUS SYSTEM:  Alert & Oriented X2 (no date), Good concentration; Motor Strength 5/5 B/L upper and lower extremities  CHEST/LUNG: Clear to percussion bilaterally; No rales, rhonchi, wheezing, or rubs  HEART: Regular rate and rhythm; No murmurs, rubs, or gallops  ABDOMEN: Soft, Nontender, Nondistended; Bowel sounds present  EXTREMITIES:  2+ Peripheral Pulses, No clubbing, cyanosis, or edema; L hip bulb and incision site appears C/D/I without erythema/edema. Serosanguineous output from drain.  LYMPH: No lymphadenopathy noted  SKIN: No rashes or lesions    LABS:                        10.0   12.26 )-----------( 382      ( 06 Nov 2018 08:02 )             32.0     11-06    138  |  105  |  18  ----------------------------<  128<H>  3.9   |  22  |  0.97    Ca    9.8      06 Nov 2018 06:43  Phos  2.6     11-06  Mg     1.7     11-06      PT/INR - ( 06 Nov 2018 08:14 )   PT: 12.8 sec;   INR: 1.14 ratio         PTT - ( 06 Nov 2018 08:14 )  PTT:26.5 sec        RADIOLOGY & ADDITIONAL TESTS:    Becca Plascencia  Internal Medicine PGY-3  Pager #[phone] ([place]) / [phone] ([facility])

## 2018-11-06 NOTE — PROGRESS NOTE ADULT - ASSESSMENT
Pt is a 89F with PMHx of mild dementia, CAD, HLD, HTN, spinal stenosis and left hip pain s/p drainage on 11/3 with IR.  Pt now growing GBS, pan sensitive.  Pt tolerating ceftriaxone well.      Plan:  - Pt will need 6 weeks abx  - c/w ceftriaxone 2g q24  - pls obtain MRI to r/o osteo of sacrum  -Pt will need PICC eventually  - if infection does not resolve, pt may need removal of hardware  - ID will follow 89F with PMHx of mild dementia, CAD, HLD, HTN, spinal stenosis who p/w left hip pain and elevated WBC on outpatient labs found to have left hip abscess (negative for osteomyelitis confirmed by MRI) and an incidental new RUL lung mass concerning for primary lung CA. Elected to not pursue lung mass at this time.

## 2018-11-06 NOTE — PROGRESS NOTE ADULT - PROBLEM SELECTOR PLAN 5
Suspect HAILY on CKD III. Cr has downtrended 1.4-->0.97.  - renally dose all meds, avoid nephrotoxins  - BMP QD Suspect HAILY on CKD III. Cr has downtrended 1.4-->0.97.  -renally dose all meds, avoid nephrotoxins  -BMP QD

## 2018-11-06 NOTE — PROGRESS NOTE ADULT - PROBLEM SELECTOR PLAN 7
Pt reportedly takes carvedilol, amlodipine, and torsemide as outpatient   - c/w home amlodipine and carvediolol  - holding torsemide in setting of ?active infection, add back as clinically indicated. Echo from 1/2018 showed EF60%, normal LVSF and mild concentric LVH Pt reportedly takes carvedilol, amlodipine, and torsemide as outpatient   - c/w home amlodipine and carvediolol  - holding torsemide in setting Echo from 1/2018 showed EF60%, normal LVSF and mild concentric LVH

## 2018-11-06 NOTE — PROGRESS NOTE ADULT - PROBLEM SELECTOR PLAN 5
Suspect HAILY on CKD III. Cr has downtrended 1.4-->0.97.  -renally dose all meds, avoid nephrotoxins  -BMP QD

## 2018-11-06 NOTE — PROGRESS NOTE ADULT - SUBJECTIVE AND OBJECTIVE BOX
Patient is a 89y old  Female who presents with a chief complaint of Osteomyelitis (05 Nov 2018 22:12)      INTERVAL HPI/OVERNIGHT EVENTS: NARAYAN overnight. Pain at L hip drainage site is decreasing compared to yesterday. Serosanguinous output from drain and site is negative for erythema or edema. Pt is unsure when family is coming in today to discuss further tx. Denies F/C/NS/N/V/D/CP/Palpitations/SOB/Cough.    MEDICATIONS (STANDING):  amLODIPine   Tablet 2.5 milliGRAM(s) Oral daily  atorvastatin 40 milliGRAM(s) Oral at bedtime  carvedilol 6.25 milliGRAM(s) Oral every 12 hours  cefTRIAXone   IVPB      cefTRIAXone   IVPB 2 Gram(s) IV Intermittent every 24 hours  docusate sodium 100 milliGRAM(s) Oral three times a day  gabapentin 100 milliGRAM(s) Oral three times a day  heparin  Injectable 5000 Unit(s) SubCutaneous every 12 hours  senna 2 Tablet(s) Oral at bedtime    MEDICATIONS  (PRN):  acetaminophen   Tablet .. 650 milliGRAM(s) Oral every 6 hours PRN  traMADol 25 milliGRAM(s) Oral every 6 hours PRN      REVIEW OF SYSTEMS:  CONSTITUTIONAL: No fever, weight loss, or fatigue  EYES: No eye pain, visual disturbances, or discharge  ENMT:  Moderate difficulty hearing but no tinnitus or vertigo; No sinus or throat pain  NECK: No pain or stiffness  RESPIRATORY: No cough, wheezing, chills or hemoptysis; No shortness of breath  CARDIOVASCULAR: No chest pain, palpitations, dizziness, or leg swelling  GASTROINTESTINAL: No abdominal or epigastric pain. No nausea, vomiting, or hematemesis; No diarrhea or constipation. No melena or hematochezia.  GENITOURINARY: No dysuria, frequency, hematuria, or incontinence  NEUROLOGICAL: No headaches, memory loss, loss of strength, numbness, or tremors  SKIN: No itching, burning, rashes, or lesions   MUSCULOSKELETAL: No joint pain or swelling; No muscle, back, or extremity pain. Moderate pain on L hip at site of drainage.    T(F): 98.4 (11-06-18 @ 04:52), Max: 99.5 (11-05-18 @ 22:16)  HR: 65 (11-06-18 @ 04:52) (60 - 72)  BP: 149/79 (11-06-18 @ 04:52) (135/60 - 149/79)  RR: 20 (11-06-18 @ 04:52) (18 - 20)  SpO2: 95% (11-06-18 @ 04:52) (95% - 96%)  Wt(kg): --  CAPILLARY BLOOD GLUCOSE        I&O's Summary    05 Nov 2018 07:01  -  06 Nov 2018 07:00  --------------------------------------------------------  IN: 480 mL / OUT: 780 mL / NET: -300 mL        PHYSICAL EXAM:  GENERAL: NAD, well-groomed, well-developed  HEAD:  Atraumatic, Normocephalic  EYES: EOMI, PERRLA, conjunctiva and sclera clear  ENMT: No tonsillar erythema, exudates, or enlargement; Moist mucous membranes  NECK: Supple, No JVD, Normal thyroid  NERVOUS SYSTEM:  Alert & Oriented X3, Good concentration; Motor Strength 5/5 B/L upper and lower extremities  CHEST/LUNG: Clear to percussion bilaterally; No rales, rhonchi, wheezing, or rubs  HEART: Regular rate and rhythm; No murmurs, rubs, or gallops  ABDOMEN: Soft, Nontender, Nondistended; Bowel sounds present  EXTREMITIES:  2+ Peripheral Pulses, No clubbing, cyanosis, or edema. L hip drainage site moderate pain to palpation, no erythema or edema.  LYMPH: No lymphadenopathy noted  SKIN: No rashes or lesions    LABS:                        10.0   12.26 )-----------( 382      ( 06 Nov 2018 08:02 )             32.0     11-06    138  |  105  |  18  ----------------------------<  128<H>  3.9   |  22  |  0.97    Ca    9.8      06 Nov 2018 06:43  Phos  2.6     11-06  Mg     1.7     11-06      PT/INR - ( 06 Nov 2018 08:14 )   PT: 12.8 sec;   INR: 1.14 ratio         PTT - ( 06 Nov 2018 08:14 )  PTT:26.5 sec        RADIOLOGY & ADDITIONAL TESTS:    Becca Plascencia  Internal Medicine PGY-3  Pager #[phone] ([place]) / [phone] ([facility]) Patient is a 89y old  Female who presents with a chief complaint of Osteomyelitis (05 Nov 2018 22:12)      INTERVAL HPI/OVERNIGHT EVENTS: NARAYAN overnight. Pain at L hip drainage site is decreasing compared to yesterday. Serosanguinous output from drain and site is negative for erythema or edema. Pt is unsure when family is coming in today to discuss further tx. Denies F/C/NS/N/V/D/CP/Palpitations/SOB/Cough.    MEDICATIONS (STANDING):  amLODIPine   Tablet 2.5 milliGRAM(s) Oral daily  atorvastatin 40 milliGRAM(s) Oral at bedtime  carvedilol 6.25 milliGRAM(s) Oral every 12 hours  cefTRIAXone   IVPB      cefTRIAXone   IVPB 2 Gram(s) IV Intermittent every 24 hours  docusate sodium 100 milliGRAM(s) Oral three times a day  gabapentin 100 milliGRAM(s) Oral three times a day  heparin  Injectable 5000 Unit(s) SubCutaneous every 12 hours  senna 2 Tablet(s) Oral at bedtime    MEDICATIONS  (PRN):  acetaminophen   Tablet .. 650 milliGRAM(s) Oral every 6 hours PRN  traMADol 25 milliGRAM(s) Oral every 6 hours PRN      REVIEW OF SYSTEMS:  CONSTITUTIONAL: No fever, weight loss, or fatigue  EYES: No eye pain, visual disturbances, or discharge  ENMT:  Moderate difficulty hearing but no tinnitus or vertigo; No sinus or throat pain  NECK: No pain or stiffness  RESPIRATORY: No cough, wheezing, chills or hemoptysis; No shortness of breath  CARDIOVASCULAR: No chest pain, palpitations, dizziness, or leg swelling  GASTROINTESTINAL: No abdominal or epigastric pain. No nausea, vomiting, or hematemesis; No diarrhea or constipation. No melena or hematochezia.  GENITOURINARY: No dysuria, frequency, hematuria, or incontinence  NEUROLOGICAL: No headaches, memory loss, loss of strength, numbness, or tremors  SKIN: No itching, burning, rashes, or lesions   MUSCULOSKELETAL: + hip pain; No muscle, back, or extremity pain. Moderate pain on L hip at site of drainage.    T(F): 98.4 (11-06-18 @ 04:52), Max: 99.5 (11-05-18 @ 22:16)  HR: 65 (11-06-18 @ 04:52) (60 - 72)  BP: 149/79 (11-06-18 @ 04:52) (135/60 - 149/79)  RR: 20 (11-06-18 @ 04:52) (18 - 20)  SpO2: 95% (11-06-18 @ 04:52) (95% - 96%)  Wt(kg): --      I&O's Summary    05 Nov 2018 07:01  -  06 Nov 2018 07:00  --------------------------------------------------------  IN: 480 mL / OUT: 780 mL / NET: -300 mL        PHYSICAL EXAM:  GENERAL: NAD, well-groomed, well-developed  HEAD:  Atraumatic, Normocephalic  EYES: EOMI, PERRLA, conjunctiva and sclera clear  ENMT: No tonsillar erythema, exudates, or enlargement; Moist mucous membranes  NECK: Supple, No JVD, Normal thyroid  NERVOUS SYSTEM:  Alert & Oriented X2 (not date), Good concentration; Motor Strength 5/5 B/L upper and lower extremities  CHEST/LUNG: Clear to percussion bilaterally; No rales, rhonchi, wheezing, or rubs  HEART: Regular rate and rhythm; No murmurs, rubs, or gallops  ABDOMEN: Soft, Nontender, Nondistended; Bowel sounds present  EXTREMITIES:  2+ Peripheral Pulses, No clubbing, cyanosis, or edema. L hip drainage site moderate pain to palpation, no erythema or edema.  LYMPH: No lymphadenopathy noted  SKIN: No rashes or lesions    LABS:                        10.0   12.26 )-----------( 382      ( 06 Nov 2018 08:02 )             32.0     11-06    138  |  105  |  18  ----------------------------<  128<H>  3.9   |  22  |  0.97    Ca    9.8      06 Nov 2018 06:43  Phos  2.6     11-06  Mg     1.7     11-06      PT/INR - ( 06 Nov 2018 08:14 )   PT: 12.8 sec;   INR: 1.14 ratio         PTT - ( 06 Nov 2018 08:14 )  PTT:26.5 sec    Labs personally reviewed  Case d/w consultants   Consultants' notes personally reviewed.

## 2018-11-06 NOTE — PROGRESS NOTE ADULT - PROBLEM SELECTOR PLAN 3
New finding on CT chest concerning for primary lung CA. Extensive d/w patient's grandson (HCP are daughters, Erin and Manuela, who are not present). Unlikely would want further workup of lung mass, will further discuss as family.   - Pt DNR/DNI New finding on CT chest concerning for primary lung CA. Extensive d/w patient's grandson (HCP are daughters, Erin and Manuela, who are not present). Unlikely would want further workup of lung mass, will further discuss as family.   -Pt DNR/DNI  -Pt's medical decision maker does not wish to pursue this during this admission. New finding on CT chest concerning for primary lung CA. Extensive d/w patient's grandson yesterday (HCP are daughters, Erin and Manuela, who are not present). Unlikely would want further workup of lung mass, will further discuss as family.   -Pt DNR/DNI  -Pt's medical decision maker does not wish to pursue this during this admission.

## 2018-11-06 NOTE — PROGRESS NOTE ADULT - PROBLEM SELECTOR PLAN 4
Possibly secondary to suspected lung malignancy. Borderline.   - check vitd, pth, pthrp for now  - s/p 2.5L ns Possibly secondary to suspected lung malignancy. Borderline.   -check vitd, pth, pthrp for now  -s/p 2.5L ns Possibly secondary to suspected lung malignancy. Borderline but stable.

## 2018-11-06 NOTE — PROGRESS NOTE ADULT - PROBLEM SELECTOR PLAN 2
Left hip abscess aspirated by IR (11/3) in setting of L hip hardware. MRI negative for osteomyelitis.  -abscess culture grew GBS  -c/w ceftriaxone 2g IV daily D2/42 as per ID recs  -appreciate ortho recs, WBAT  -fall precautions  -incentive spirometry

## 2018-11-06 NOTE — PROGRESS NOTE ADULT - PROBLEM SELECTOR PLAN 3
New finding on CT chest concerning for primary lung CA. Extensive d/w patient's grandson yesterday (HCP are daughters, Erin and Manuela, who are not present). Unlikely would want further workup of lung mass, will further discuss as family.   -Pt DNR/DNI  -Pt's medical decision maker does not wish to pursue this during this admission.

## 2018-11-06 NOTE — PROGRESS NOTE ADULT - ATTENDING COMMENTS
-tolerating CTX  -plan long term iv abx followed by lifelong po suppression  -PICC  -coag neg staph in bcx is likely contaminant  -f/u repeat bcx    Sam Ceron  Attending Physician   Division of Infectious Disease  Pager #[phone]  After 5pm/weekend or no response, call #[phone]

## 2018-11-06 NOTE — PROGRESS NOTE ADULT - ATTENDING COMMENTS
Patient seen and examined. Patient comfortable today. Personally reviewed MRI Lumbosacral spine, there is no clear evidence of osteomyelitis or disciitis. ID f/u appreciated. She is tolerating ceftriaxone well, no evidence of allergic reaction. Will wait until BCX are negative x 48 hours, then plan for PICC placement for longterm ABX. Plan d/w patient and her family at bedside, preference to dispo to home.    Plan d/w ID (Dr. Ceron) and medicine resident (Dr. Corado).    Wilmer Michelle M.D.  Hospitalist  Pager: [phone]

## 2018-11-06 NOTE — PROGRESS NOTE ADULT - PROBLEM SELECTOR PLAN 7
Pt reportedly takes carvedilol, amlodipine, and torsemide as outpatient   - c/w home amlodipine and carvediolol  - holding torsemide in setting Echo from 1/2018 showed EF60%, normal LVSF and mild concentric LVH

## 2018-11-06 NOTE — PROGRESS NOTE ADULT - PROBLEM SELECTOR PLAN 1
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 Received one dose of aztreonam and vanc in ED (PCN allergic). Lactate initially elevated to 2.5 on presentation, now wnl after 2.5L NS bolus. Vitally stable and nontoxic appearing on exam.  -Ceftriaxone as below. 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 Received one dose of aztreonam and vanc in ED (PCN allergic). Lactate initially elevated to 2.5 on presentation, now wnl after 2.5L NS bolus. Vitally stable and nontoxic appearing on exam.  -Ceftriaxone 2g IV daily for 6 weeks. PICC placement after 48hrs.  -BC grew CoNS, Body fluid culture grew GBS. BCx2 reordered (11/5) due to possible contaminant.  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   -Ceftriaxone 2g IV daily for 6 weeks. PICC placement when BCX clear x 48 hours  -BC grew CoNS, Body fluid culture grew GBS. BCx2 reordered (11/5) due to possible contaminant.  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   -Ceftriaxone 2g IV daily for 6 weeks. PICC placement when BCX clear x 48 hours  -BC grew CoNS, Body fluid culture grew GBS. BCx2 reordered (11/5) due to possible contaminant.  -Appreciate ortho recs, WBAT  -fall precautions  -incentive spirometry  -MRI negative for osteomyelitis of the sacrum 2/2 to sacral decubitus ulcer.

## 2018-11-06 NOTE — PROGRESS NOTE ADULT - PROBLEM SELECTOR PLAN 2
Left hip abscess aspirated by IR (11/3) in setting of L hip hardware  -abscess culture grew GBS  -Will challenge w/ ceftriaxone 2 g IV daily (d/w family. Patient w/ distant reported PCN allergy w/ "hives". Close monitoring) Left hip abscess aspirated by IR (11/3) in setting of L hip hardware  -abscess culture grew GBS  - c/w ceftriaxone as above (patient tolerated with no allergic reaction yesterday)

## 2018-11-07 LAB
ANION GAP SERPL CALC-SCNC: 11 MMOL/L — SIGNIFICANT CHANGE UP (ref 5–17)
APTT BLD: 27.7 SEC — SIGNIFICANT CHANGE UP (ref 27.5–36.3)
BASOPHILS # BLD AUTO: 0.1 K/UL — SIGNIFICANT CHANGE UP (ref 0–0.2)
BASOPHILS NFR BLD AUTO: 0.5 % — SIGNIFICANT CHANGE UP (ref 0–2)
BUN SERPL-MCNC: 18 MG/DL — SIGNIFICANT CHANGE UP (ref 7–23)
CALCIUM SERPL-MCNC: 10.2 MG/DL — SIGNIFICANT CHANGE UP (ref 8.4–10.5)
CHLORIDE SERPL-SCNC: 105 MMOL/L — SIGNIFICANT CHANGE UP (ref 96–108)
CO2 SERPL-SCNC: 23 MMOL/L — SIGNIFICANT CHANGE UP (ref 22–31)
CREAT SERPL-MCNC: 1.02 MG/DL — SIGNIFICANT CHANGE UP (ref 0.5–1.3)
CULTURE RESULTS: SIGNIFICANT CHANGE UP
EOSINOPHIL # BLD AUTO: 0.2 K/UL — SIGNIFICANT CHANGE UP (ref 0–0.5)
EOSINOPHIL NFR BLD AUTO: 2.4 % — SIGNIFICANT CHANGE UP (ref 0–6)
GLUCOSE SERPL-MCNC: 128 MG/DL — HIGH (ref 70–99)
HCT VFR BLD CALC: 33.3 % — LOW (ref 34.5–45)
HGB BLD-MCNC: 10.9 G/DL — LOW (ref 11.5–15.5)
INR BLD: 1.08 RATIO — SIGNIFICANT CHANGE UP (ref 0.88–1.16)
LYMPHOCYTES # BLD AUTO: 1.2 K/UL — SIGNIFICANT CHANGE UP (ref 1–3.3)
LYMPHOCYTES # BLD AUTO: 11.4 % — LOW (ref 13–44)
MAGNESIUM SERPL-MCNC: 1.8 MG/DL — SIGNIFICANT CHANGE UP (ref 1.6–2.6)
MCHC RBC-ENTMCNC: 28.9 PG — SIGNIFICANT CHANGE UP (ref 27–34)
MCHC RBC-ENTMCNC: 32.7 GM/DL — SIGNIFICANT CHANGE UP (ref 32–36)
MCV RBC AUTO: 88.3 FL — SIGNIFICANT CHANGE UP (ref 80–100)
MONOCYTES # BLD AUTO: 0.8 K/UL — SIGNIFICANT CHANGE UP (ref 0–0.9)
MONOCYTES NFR BLD AUTO: 7.3 % — SIGNIFICANT CHANGE UP (ref 2–14)
NEUTROPHILS # BLD AUTO: 8.2 K/UL — HIGH (ref 1.8–7.4)
NEUTROPHILS NFR BLD AUTO: 78.3 % — HIGH (ref 43–77)
PHOSPHATE SERPL-MCNC: 2.4 MG/DL — LOW (ref 2.5–4.5)
PLATELET # BLD AUTO: 389 K/UL — SIGNIFICANT CHANGE UP (ref 150–400)
POTASSIUM SERPL-MCNC: 4.1 MMOL/L — SIGNIFICANT CHANGE UP (ref 3.5–5.3)
POTASSIUM SERPL-SCNC: 4.1 MMOL/L — SIGNIFICANT CHANGE UP (ref 3.5–5.3)
PROTHROM AB SERPL-ACNC: 12.4 SEC — SIGNIFICANT CHANGE UP (ref 10–12.9)
RBC # BLD: 3.77 M/UL — LOW (ref 3.8–5.2)
RBC # FLD: 13.7 % — SIGNIFICANT CHANGE UP (ref 10.3–14.5)
SODIUM SERPL-SCNC: 139 MMOL/L — SIGNIFICANT CHANGE UP (ref 135–145)
SPECIMEN SOURCE: SIGNIFICANT CHANGE UP
WBC # BLD: 10.4 K/UL — SIGNIFICANT CHANGE UP (ref 3.8–10.5)
WBC # FLD AUTO: 10.4 K/UL — SIGNIFICANT CHANGE UP (ref 3.8–10.5)

## 2018-11-07 PROCEDURE — 99233 SBSQ HOSP IP/OBS HIGH 50: CPT | Mod: GC

## 2018-11-07 PROCEDURE — 93970 EXTREMITY STUDY: CPT | Mod: 26

## 2018-11-07 PROCEDURE — 99232 SBSQ HOSP IP/OBS MODERATE 35: CPT | Mod: GC

## 2018-11-07 RX ORDER — CEFTRIAXONE 500 MG/1
2 INJECTION, POWDER, FOR SOLUTION INTRAMUSCULAR; INTRAVENOUS
Qty: 80 | Refills: 0 | OUTPATIENT
Start: 2018-11-07 | End: 2018-12-16

## 2018-11-07 RX ORDER — CEFTRIAXONE 500 MG/1
2 INJECTION, POWDER, FOR SOLUTION INTRAMUSCULAR; INTRAVENOUS
Qty: 80 | Refills: 0
Start: 2018-11-07 | End: 2018-12-16

## 2018-11-07 RX ORDER — SODIUM,POTASSIUM PHOSPHATES 278-250MG
1 POWDER IN PACKET (EA) ORAL
Qty: 0 | Refills: 0 | Status: COMPLETED | OUTPATIENT
Start: 2018-11-07 | End: 2018-11-07

## 2018-11-07 RX ADMIN — Medication 100 MILLIGRAM(S): at 21:20

## 2018-11-07 RX ADMIN — SENNA PLUS 2 TABLET(S): 8.6 TABLET ORAL at 21:20

## 2018-11-07 RX ADMIN — CEFTRIAXONE 100 GRAM(S): 500 INJECTION, POWDER, FOR SOLUTION INTRAMUSCULAR; INTRAVENOUS at 13:58

## 2018-11-07 RX ADMIN — CARVEDILOL PHOSPHATE 6.25 MILLIGRAM(S): 80 CAPSULE, EXTENDED RELEASE ORAL at 05:09

## 2018-11-07 RX ADMIN — Medication 1 TABLET(S): at 17:56

## 2018-11-07 RX ADMIN — ATORVASTATIN CALCIUM 40 MILLIGRAM(S): 80 TABLET, FILM COATED ORAL at 21:19

## 2018-11-07 RX ADMIN — Medication 100 MILLIGRAM(S): at 13:59

## 2018-11-07 RX ADMIN — AMLODIPINE BESYLATE 2.5 MILLIGRAM(S): 2.5 TABLET ORAL at 05:09

## 2018-11-07 RX ADMIN — GABAPENTIN 100 MILLIGRAM(S): 400 CAPSULE ORAL at 13:59

## 2018-11-07 RX ADMIN — GABAPENTIN 100 MILLIGRAM(S): 400 CAPSULE ORAL at 05:09

## 2018-11-07 RX ADMIN — HEPARIN SODIUM 5000 UNIT(S): 5000 INJECTION INTRAVENOUS; SUBCUTANEOUS at 05:09

## 2018-11-07 RX ADMIN — CARVEDILOL PHOSPHATE 6.25 MILLIGRAM(S): 80 CAPSULE, EXTENDED RELEASE ORAL at 17:56

## 2018-11-07 RX ADMIN — Medication 100 MILLIGRAM(S): at 05:09

## 2018-11-07 RX ADMIN — GABAPENTIN 100 MILLIGRAM(S): 400 CAPSULE ORAL at 21:20

## 2018-11-07 RX ADMIN — HEPARIN SODIUM 5000 UNIT(S): 5000 INJECTION INTRAVENOUS; SUBCUTANEOUS at 17:56

## 2018-11-07 RX ADMIN — Medication 1 TABLET(S): at 21:19

## 2018-11-07 NOTE — PROGRESS NOTE ADULT - PROBLEM SELECTOR PLAN 5
Possibly secondary to suspected lung malignancy. Borderline but stable. Suspect HAILY on CKD III. Cr has downtrended 1.4-->0.97.  -renally dose all meds, avoid nephrotoxins  -BMP QD

## 2018-11-07 NOTE — PROGRESS NOTE ADULT - ATTENDING COMMENTS
Sam Ceron  Attending Physician   Division of Infectious Disease  Pager #[phone]  After 5pm/weekend or no response, call #[phone]

## 2018-11-07 NOTE — PROGRESS NOTE ADULT - SUBJECTIVE AND OBJECTIVE BOX
Patient is a 89y old  Female who presents with a chief complaint of Osteomyelitis (07 Nov 2018 11:21)      INTERVAL HPI/OVERNIGHT EVENTS: NARAYAN overnight. Continues to tolerate Rocephin well w/out rash, erythema, or dyspnea. Pain continues to decreased from L hip with serosanguineous output from drain. 420cc output on 11/6. Denies F/C/NS/N/V/D/CP/Palpitations/SOB/Cough. BM last night.    MEDICATIONS (STANDING):  amLODIPine   Tablet 2.5 milliGRAM(s) Oral daily  atorvastatin 40 milliGRAM(s) Oral at bedtime  carvedilol 6.25 milliGRAM(s) Oral every 12 hours  cefTRIAXone   IVPB      cefTRIAXone   IVPB 2 Gram(s) IV Intermittent every 24 hours  docusate sodium 100 milliGRAM(s) Oral three times a day  gabapentin 100 milliGRAM(s) Oral three times a day  heparin  Injectable 5000 Unit(s) SubCutaneous every 12 hours  senna 2 Tablet(s) Oral at bedtime    MEDICATIONS  (PRN):  acetaminophen   Tablet .. 650 milliGRAM(s) Oral every 6 hours PRN  traMADol 25 milliGRAM(s) Oral every 6 hours PRN      REVIEW OF SYSTEMS:  CONSTITUTIONAL: No fever, weight loss, or fatigue  EYES: No eye pain, visual disturbances, or discharge  ENMT:  Difficulty hearing but no tinnitus, vertigo; No sinus or throat pain  NECK: No pain or stiffness  RESPIRATORY: No cough, wheezing, chills or hemoptysis; No shortness of breath  CARDIOVASCULAR: No chest pain, palpitations, dizziness, or leg swelling  GASTROINTESTINAL: No abdominal or epigastric pain. No nausea, vomiting, or hematemesis; No diarrhea or constipation. No melena or hematochezia.  GENITOURINARY: No dysuria, frequency, hematuria, or incontinence  NEUROLOGICAL: No headaches, memory loss, loss of strength, numbness, or tremors  SKIN: No itching, burning, rashes, or lesions   MUSCULOSKELETAL: No joint pain or swelling; No muscle, back, or extremity pain; mild L hip pain at site of drainage    T(F): 98.6 (11-07-18 @ 04:37), Max: 99.5 (11-06-18 @ 20:41)  HR: 72 (11-07-18 @ 04:37) (72 - 75)  BP: 153/60 (11-07-18 @ 04:37) (122/68 - 153/60)  RR: 19 (11-07-18 @ 04:37) (17 - 19)  SpO2: 95% (11-07-18 @ 04:37) (94% - 95%)  Wt(kg): --  CAPILLARY BLOOD GLUCOSE        I&O's Summary    06 Nov 2018 07:01  -  07 Nov 2018 07:00  --------------------------------------------------------  IN: 1220 mL / OUT: 2200 mL / NET: -980 mL    07 Nov 2018 07:01  -  07 Nov 2018 13:53  --------------------------------------------------------  IN: 860 mL / OUT: 300 mL / NET: 560 mL        PHYSICAL EXAM:  GENERAL: NAD, well-groomed, well-developed  HEAD:  Atraumatic, Normocephalic  EYES: EOMI, PERRLA, conjunctiva and sclera clear  ENMT: No tonsillar erythema, exudates, or enlargement; Moist mucous membranes  NECK: Supple, No JVD, Normal thyroid  NERVOUS SYSTEM:  Alert & Oriented X2 (not time), moderate difficulty concentrating; Motor Strength 5/5 B/L upper and lower extremities  CHEST/LUNG: Clear to percussion bilaterally; No rales, rhonchi, wheezing, or rubs  HEART: Regular rate and rhythm; No murmurs, rubs, or gallops  ABDOMEN: Soft, Nontender, Nondistended; Bowel sounds present  EXTREMITIES:  2+ Peripheral Pulses, No clubbing, cyanosis, or edema; L hip drainage site C/D/I without erythema/edema  LYMPH: No lymphadenopathy noted  SKIN: No rashes or lesions    LABS:                        10.9   10.4  )-----------( 389      ( 07 Nov 2018 11:07 )             33.3     11-07    139  |  105  |  18  ----------------------------<  128<H>  4.1   |  23  |  1.02    Ca    10.2      07 Nov 2018 11:07  Phos  2.4     11-07  Mg     1.8     11-07      PT/INR - ( 07 Nov 2018 11:07 )   PT: 12.4 sec;   INR: 1.08 ratio         PTT - ( 07 Nov 2018 11:07 )  PTT:27.7 sec Patient is a 89y old  Female who presents with a chief complaint of Osteomyelitis (07 Nov 2018 11:21)      INTERVAL HPI/OVERNIGHT EVENTS: NARAYAN overnight. Continues to tolerate Rocephin well w/out rash, erythema, or dyspnea. Pain continues to decreased from L hip with serosanguineous output from drain. 420cc output on 11/6. Denies F/C/NS/N/V/D/CP/Palpitations/SOB/Cough. BM last night.    MEDICATIONS (STANDING):  amLODIPine   Tablet 2.5 milliGRAM(s) Oral daily  atorvastatin 40 milliGRAM(s) Oral at bedtime  carvedilol 6.25 milliGRAM(s) Oral every 12 hours  cefTRIAXone   IVPB      cefTRIAXone   IVPB 2 Gram(s) IV Intermittent every 24 hours  docusate sodium 100 milliGRAM(s) Oral three times a day  gabapentin 100 milliGRAM(s) Oral three times a day  heparin  Injectable 5000 Unit(s) SubCutaneous every 12 hours  senna 2 Tablet(s) Oral at bedtime    MEDICATIONS  (PRN):  acetaminophen   Tablet .. 650 milliGRAM(s) Oral every 6 hours PRN  traMADol 25 milliGRAM(s) Oral every 6 hours PRN      REVIEW OF SYSTEMS:  CONSTITUTIONAL: No fever, weight loss, or fatigue  EYES: No eye pain, visual disturbances, or discharge  ENMT:  Difficulty hearing but no tinnitus, vertigo; No sinus or throat pain  NECK: No pain or stiffness  RESPIRATORY: No cough, wheezing, chills or hemoptysis; No shortness of breath  CARDIOVASCULAR: No chest pain, palpitations, dizziness, or leg swelling  GASTROINTESTINAL: No abdominal or epigastric pain. No nausea, vomiting, or hematemesis; No diarrhea or constipation. No melena or hematochezia.  GENITOURINARY: No dysuria, frequency, hematuria, or incontinence  NEUROLOGICAL: No headaches, memory loss, loss of strength, numbness, or tremors  SKIN: No itching, burning, rashes, or lesions   MUSCULOSKELETAL: No joint pain or swelling; No muscle, back, or extremity pain; mild L hip pain at site of drainage    T(F): 98.6 (11-07-18 @ 04:37), Max: 99.5 (11-06-18 @ 20:41)  HR: 72 (11-07-18 @ 04:37) (72 - 75)  BP: 153/60 (11-07-18 @ 04:37) (122/68 - 153/60)  RR: 19 (11-07-18 @ 04:37) (17 - 19)  SpO2: 95% (11-07-18 @ 04:37) (94% - 95%)  Wt(kg): --      I&O's Summary    06 Nov 2018 07:01  -  07 Nov 2018 07:00  --------------------------------------------------------  IN: 1220 mL / OUT: 2200 mL / NET: -980 mL    07 Nov 2018 07:01  -  07 Nov 2018 13:53  --------------------------------------------------------  IN: 860 mL / OUT: 300 mL / NET: 560 mL        PHYSICAL EXAM:  GENERAL: NAD, well-groomed, well-developed  HEAD:  Atraumatic, Normocephalic  EYES: EOMI, PERRLA, conjunctiva and sclera clear  ENMT: No tonsillar erythema, exudates, or enlargement; Moist mucous membranes  NECK: Supple, No JVD, Normal thyroid  NERVOUS SYSTEM:  Alert & Oriented X2 (not time), moderate difficulty concentrating; Motor Strength 5/5 B/L upper and lower extremities  CHEST/LUNG: Clear to percussion bilaterally; No rales, rhonchi, wheezing, or rubs  HEART: Regular rate and rhythm; No murmurs, rubs, or gallops  ABDOMEN: Soft, Nontender, Nondistended; Bowel sounds present  EXTREMITIES:  2+ Peripheral Pulses, No clubbing, cyanosis, or edema; L hip drainage site C/D/I without erythema/edema  LYMPH: No lymphadenopathy noted  SKIN: No rashes or lesions    LABS:                        10.9   10.4  )-----------( 389      ( 07 Nov 2018 11:07 )             33.3     11-07    139  |  105  |  18  ----------------------------<  128<H>  4.1   |  23  |  1.02    Ca    10.2      07 Nov 2018 11:07  Phos  2.4     11-07  Mg     1.8     11-07      PT/INR - ( 07 Nov 2018 11:07 )   PT: 12.4 sec;   INR: 1.08 ratio         PTT - ( 07 Nov 2018 11:07 )  PTT:27.7 sec    Labs personally reviewed [ X ]  Consultants' notes personally reviewed [ X ]

## 2018-11-07 NOTE — PROGRESS NOTE ADULT - PROBLEM SELECTOR PLAN 2
Left hip abscess aspirated by IR (11/3) in setting of L hip hardware  -abscess culture grew GBS  -c/w ceftriaxone as above (patient tolerated with no allergic reaction yesterday)  -drain output 420cc serosanguineous Left hip abscess aspirated by IR (11/3) in setting of L hip hardware  -abscess culture grew GBS  -c/w ceftriaxone as above (patient tolerated with no allergic reaction yesterday)  -IR long-term plan for drain is to schedule follow-up as outpt when drain output is 10cc or less for repeat CT and tube study (contrast+xray). Number is [phone] Pt reportedly takes carvedilol, amlodipine, and torsemide as outpatient   -c/w home amlodipine and carvediolol  -holding torsemide in setting Echo from 1/2018 showed EF60%, normal LVSF and mild concentric LVH  -systolic elevated since last night (150's); will continue to monitor

## 2018-11-07 NOTE — PROGRESS NOTE ADULT - ASSESSMENT
89F with PMHx of mild dementia, CAD, HLD, HTN, spinal stenosis who p/w left hip pain and elevated WBC on outpatient labs found to have osteomyelitis, large fluid collection in left hip, and an incidental new RUL lung mass concerning for primary lung CA.

## 2018-11-07 NOTE — PROGRESS NOTE ADULT - PROBLEM SELECTOR PLAN 6
Suspect HAILY on CKD III. Cr has downtrended 1.4-->0.97.  -renally dose all meds, avoid nephrotoxins  -BMP QD Pain currently well controlled, has not needed pain medication since presentation  -Pain control with tramadol q6h PRN and tylenol

## 2018-11-07 NOTE — PROGRESS NOTE ADULT - ATTENDING COMMENTS
Patient seen and examined. Feels well today. If BCX remain negative tomorrow, will plan for PICC placement. Eventual dispo to home w/ home PT and home care.    Plan d/w patient and her daughter (Erin), ID (Dr. Ceron) and medicine resident (Dr. Corado).    Wilmer Michelle M.D.  Hospitalist  Pager: [phone]

## 2018-11-07 NOTE — PROGRESS NOTE ADULT - PROBLEM SELECTOR PLAN 4
New finding on CT chest concerning for primary lung CA. Extensive d/w patient's grandson yesterday (HCP are daughters, Erni and Manuela, who are not present). Unlikely would want further workup of lung mass, will further discuss as family.   -Pt DNR/DNI  -Pt's medical decision maker does not wish to pursue this during this admission. Possibly secondary to suspected lung malignancy. Borderline but stable.

## 2018-11-07 NOTE — PROGRESS NOTE ADULT - PROBLEM SELECTOR PLAN 1
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   -Ceftriaxone 2g IV daily for 6 weeks. PICC placement when BCX clear x 48 hours  -BC grew CoNS, Body fluid culture grew GBS. BCx2 reordered (11/5) due to possible contaminant.  -Appreciate ortho recs, WBAT  -fall precautions  -incentive spirometry  -MRI negative for osteomyelitis of the sacrum 2/2 to sacral decubitus ulcer. Left hip abscess aspirated by IR (11/3) in setting of L hip hardware  -abscess culture grew GBS  -c/w ceftriaxone 2 g IV daily until 12/18 as per ID  -IR long-term plan for drain is to schedule follow-up as outpt when drain output is 10cc or less for repeat CT and tube study (contrast+xray). Number is [phone]

## 2018-11-07 NOTE — PROGRESS NOTE ADULT - PROBLEM SELECTOR PLAN 3
Pt reportedly takes carvedilol, amlodipine, and torsemide as outpatient   -c/w home amlodipine and carvediolol  -holding torsemide in setting Echo from 1/2018 showed EF60%, normal LVSF and mild concentric LVH  -systolic elevated since last night (150's); will continue to monitor n/a New finding on CT chest concerning for primary lung CA. Extensive d/w patient's grandson on admission (HCP are daughters, Erin and Manuela, who are not present). Unlikely would want further workup of lung mass, also confirmed this w/ Erin today.   -Pt DNR/DNI  -Pt's medical decision maker does not wish to pursue this during this admission.

## 2018-11-07 NOTE — PROGRESS NOTE ADULT - PROBLEM SELECTOR PLAN 7
Pain currently well controlled, has not needed pain medication since presentation  -Pain control with tramadol q6h PRN and tylenol - c/w donepezil 10mg QD

## 2018-11-07 NOTE — PROGRESS NOTE ADULT - SUBJECTIVE AND OBJECTIVE BOX
INFECTIOUS DISEASES FOLLOW UP    This is a follow up note for this  89yFemale with  Cellulitis of buttock    Pt examined at bedside this am.  Denies fevers, chills, nausea overnight.  States hip pain is better.  Denies back pain at this time.    ROS:  CONSTITUTIONAL:  No fever, chills  CARDIOVASCULAR:  No chest pain, palpitations  RESPIRATORY:  No dyspnea, cough  GASTROINTESTINAL:  No nausea, vomiting, diarrhea, or abdominal pain  GENITOURINARY:  No dysuria, frequency   NEUROLOGIC:  No headache    Allergies    penicillin (Unknown)    Intolerances        ANTIBIOTICS/RELEVANT:  antimicrobials  cefTRIAXone   IVPB      cefTRIAXone   IVPB 2 Gram(s) IV Intermittent every 24 hours    immunologic:    OTHER:  acetaminophen   Tablet .. 650 milliGRAM(s) Oral every 6 hours PRN  amLODIPine   Tablet 2.5 milliGRAM(s) Oral daily  atorvastatin 40 milliGRAM(s) Oral at bedtime  carvedilol 6.25 milliGRAM(s) Oral every 12 hours  docusate sodium 100 milliGRAM(s) Oral three times a day  gabapentin 100 milliGRAM(s) Oral three times a day  heparin  Injectable 5000 Unit(s) SubCutaneous every 12 hours  senna 2 Tablet(s) Oral at bedtime  traMADol 25 milliGRAM(s) Oral every 6 hours PRN      Objective:  Vital Signs Last 24 Hrs  T(C): 37 (07 Nov 2018 04:37), Max: 37.5 (06 Nov 2018 20:41)  T(F): 98.6 (07 Nov 2018 04:37), Max: 99.5 (06 Nov 2018 20:41)  HR: 72 (07 Nov 2018 04:37) (72 - 75)  BP: 153/60 (07 Nov 2018 04:37) (121/60 - 153/60)  BP(mean): --  RR: 19 (07 Nov 2018 04:37) (17 - 19)  SpO2: 95% (07 Nov 2018 04:37) (94% - 96%)    PHYSICAL EXAM:  Constitutional: NAD, sitting in chair  Eyes: KLEVER, EOMI  Respiratory: CTABL  Cardiovascular: normal S1/S2, no murmurs, rubs, or gallops  Gastrointestinal:soft, (+) BS, no tenderness  Extremities: 2+ peripheral pulses  Hip with drain in place, serosanguious drainage at this time.  No Lymphadenopathy  IV sites not inflammed.    LABS:                        10.9   10.4  )-----------( 389      ( 07 Nov 2018 11:07 )             33.3     11-06    138  |  105  |  18  ----------------------------<  128<H>  3.9   |  22  |  0.97    Ca    9.8      06 Nov 2018 06:43  Phos  2.6     11-06  Mg     1.7     11-06      PT/INR - ( 07 Nov 2018 11:07 )   PT: 12.4 sec;   INR: 1.08 ratio         PTT - ( 07 Nov 2018 11:07 )  PTT:27.7 sec      MICROBIOLOGY:            RECENT CULTURES:  11-03 @ 17:20  .Body Fluid Interstitial Fluid  Streptococcus agalactiae (Group B)  Streptococcus agalactiae (Group B)  KB    Moderate Streptococcus agalactiae (Group B)  --  11-03 @ 08:38  .Urine Clean Catch (Midstream)  --  --  --    No growth  --  11-02 @ 17:28  .Blood Blood-Peripheral  Blood Culture PCR  Blood Culture PCR  PCR    Growth in aerobic bottle: Coag Negative Staphylococcus "Susceptibilities  not performed"  "Due to technical problems, Proteus sp. will Not be reported as part of  the BCID panel until further notice"  ***Blood Panel PCR results on this specimen areavailable  approximately 3 hours after the Gram stain result.***  Gram stain, PCR, and/or culture results may not always  correspond due to difference in methodologies.  ************************************************************  This PCR assay wasperformed using [employer].  The following targets are tested for: Enterococcus,  vancomycin resistant enterococci, Listeria monocytogenes,  coagulase negative staphylococci, S. aureus,  methicillin resistant S. aureus, Streptococcus agalactiae  (Group B), S. pneumoniae, S. pyogenes (Group A),  Acinetobacter baumannii, Enterobacter cloacae, E. coli,  Klebsiella oxytoca, K. pneumoniae, Proteus sp.,  Serratia marcescens, Haemophilus influenzae,  Neisseria meningitidis, Pseudomonas aeruginosa, Candida  albicans, C. glabrata, C krusei, C parapsilosis,  C. tropicalis and the KPC resistance gene.  --      RADIOLOGY & ADDITIONAL STUDIES: INFECTIOUS DISEASES FOLLOW UP    This is a follow up note for this  89yFemale with  Cellulitis of buttock    CC: ID following for PJI    Pt examined at bedside this am.  Denies fevers, chills, nausea overnight.  States hip pain is better.  Denies back pain at this time.    ROS:  CONSTITUTIONAL:  No fever, chills  CARDIOVASCULAR:  No chest pain, palpitations  RESPIRATORY:  No dyspnea, cough  GASTROINTESTINAL:  No nausea, vomiting, diarrhea, or abdominal pain  GENITOURINARY:  No dysuria, frequency   NEUROLOGIC:  No headache    Allergies    penicillin (Unknown)    Intolerances        ANTIBIOTICS/RELEVANT:  antimicrobials  cefTRIAXone   IVPB      cefTRIAXone   IVPB 2 Gram(s) IV Intermittent every 24 hours    immunologic:    OTHER:  acetaminophen   Tablet .. 650 milliGRAM(s) Oral every 6 hours PRN  amLODIPine   Tablet 2.5 milliGRAM(s) Oral daily  atorvastatin 40 milliGRAM(s) Oral at bedtime  carvedilol 6.25 milliGRAM(s) Oral every 12 hours  docusate sodium 100 milliGRAM(s) Oral three times a day  gabapentin 100 milliGRAM(s) Oral three times a day  heparin  Injectable 5000 Unit(s) SubCutaneous every 12 hours  senna 2 Tablet(s) Oral at bedtime  traMADol 25 milliGRAM(s) Oral every 6 hours PRN      Objective:  Vital Signs Last 24 Hrs  T(C): 37 (07 Nov 2018 04:37), Max: 37.5 (06 Nov 2018 20:41)  T(F): 98.6 (07 Nov 2018 04:37), Max: 99.5 (06 Nov 2018 20:41)  HR: 72 (07 Nov 2018 04:37) (72 - 75)  BP: 153/60 (07 Nov 2018 04:37) (121/60 - 153/60)  BP(mean): --  RR: 19 (07 Nov 2018 04:37) (17 - 19)  SpO2: 95% (07 Nov 2018 04:37) (94% - 96%)    PHYSICAL EXAM:  Constitutional: NAD, sitting in chair  Eyes: KLEVER, EOMI  Respiratory: CTABL  Cardiovascular: normal S1/S2, no murmurs, rubs, or gallops  Gastrointestinal:soft, (+) BS, no tenderness  Extremities: 2+ peripheral pulses  Hip with drain in place, serosanguious drainage at this time.  No Lymphadenopathy  IV sites not inflammed.    LABS:                        10.9   10.4  )-----------( 389      ( 07 Nov 2018 11:07 )             33.3     11-06    138  |  105  |  18  ----------------------------<  128<H>  3.9   |  22  |  0.97    Ca    9.8      06 Nov 2018 06:43  Phos  2.6     11-06  Mg     1.7     11-06      PT/INR - ( 07 Nov 2018 11:07 )   PT: 12.4 sec;   INR: 1.08 ratio         PTT - ( 07 Nov 2018 11:07 )  PTT:27.7 sec      MICROBIOLOGY:            RECENT CULTURES:  11-03 @ 17:20  .Body Fluid Interstitial Fluid  Streptococcus agalactiae (Group B)  Streptococcus agalactiae (Group B)  KB    Moderate Streptococcus agalactiae (Group B)  --  11-03 @ 08:38  .Urine Clean Catch (Midstream)  --  --  --    No growth  --  11-02 @ 17:28  .Blood Blood-Peripheral  Blood Culture PCR  Blood Culture PCR  PCR    Growth in aerobic bottle: Coag Negative Staphylococcus "Susceptibilities  not performed"  "Due to technical problems, Proteus sp. will Not be reported as part of  the BCID panel until further notice"  ***Blood Panel PCR results on this specimen areavailable  approximately 3 hours after the Gram stain result.***  Gram stain, PCR, and/or culture results may not always  correspond due to difference in methodologies.  ************************************************************  This PCR assay wasperformed using [employer].  The following targets are tested for: Enterococcus,  vancomycin resistant enterococci, Listeria monocytogenes,  coagulase negative staphylococci, S. aureus,  methicillin resistant S. aureus, Streptococcus agalactiae  (Group B), S. pneumoniae, S. pyogenes (Group A),  Acinetobacter baumannii, Enterobacter cloacae, E. coli,  Klebsiella oxytoca, K. pneumoniae, Proteus sp.,  Serratia marcescens, Haemophilus influenzae,  Neisseria meningitidis, Pseudomonas aeruginosa, Candida  albicans, C. glabrata, C krusei, C parapsilosis,  C. tropicalis and the KPC resistance gene.  --    RADIOLOGY & ADDITIONAL STUDIES:  MR Lumbar Spine No Cont (11.06.18 @ 13:57) >  No marrow edema or abnormal prevertebral/paravertebral soft tissue. No   intradiscal edema. No evidence for discitis/ostium myelitis.    Degenerative changes.

## 2018-11-07 NOTE — PROGRESS NOTE ADULT - ASSESSMENT
89F with PMHx of mild dementia, CAD, HLD, HTN, spinal stenosis who p/w left hip pain and periprosthetic abscess now on ceftriaxone q24, tolerating well.  Pt had MRI yest showing now evidence of osteo.    Recs:  - c/w ceftriaxone 2 g q24: pt is tolerating well  - pt will need 6 weeks IV abx followed by lifelong oral PO suppression  - Pt will eventually need PICC  - ID will follow 89F with PMHx of mild dementia, CAD, HLD, HTN, spinal stenosis who p/w left hip pain and periprosthetic abscess now on ceftriaxone q24, tolerating well.  Pt had MRI yest showing now evidence of osteo.    Recs:  -c/w ceftriaxone 2 g q24: pt is tolerating well  -pt will need 6 weeks IV abx followed by lifelong oral PO suppression  -Pt will eventually need PICC  -potential side effects of PICC explained including bleeding and infection  -potential side effects of abx explained including GI, Cdiff, allergy issues, development of resistance etc.  -check weekly cbc, cmp, esr, crp - fax [phone]  -f/u in ID office in 6 weeks [phone]

## 2018-11-08 VITALS — DIASTOLIC BLOOD PRESSURE: 68 MMHG | HEART RATE: 72 BPM | SYSTOLIC BLOOD PRESSURE: 144 MMHG

## 2018-11-08 LAB
ANION GAP SERPL CALC-SCNC: 11 MMOL/L — SIGNIFICANT CHANGE UP (ref 5–17)
APTT BLD: 27.5 SEC — SIGNIFICANT CHANGE UP (ref 27.5–36.3)
BASOPHILS # BLD AUTO: 0.03 K/UL — SIGNIFICANT CHANGE UP (ref 0–0.2)
BASOPHILS NFR BLD AUTO: 0.3 % — SIGNIFICANT CHANGE UP (ref 0–2)
BUN SERPL-MCNC: 15 MG/DL — SIGNIFICANT CHANGE UP (ref 7–23)
CALCIUM SERPL-MCNC: 9.8 MG/DL — SIGNIFICANT CHANGE UP (ref 8.4–10.5)
CHLORIDE SERPL-SCNC: 105 MMOL/L — SIGNIFICANT CHANGE UP (ref 96–108)
CO2 SERPL-SCNC: 21 MMOL/L — LOW (ref 22–31)
CREAT SERPL-MCNC: 0.91 MG/DL — SIGNIFICANT CHANGE UP (ref 0.5–1.3)
EOSINOPHIL # BLD AUTO: 0.19 K/UL — SIGNIFICANT CHANGE UP (ref 0–0.5)
EOSINOPHIL NFR BLD AUTO: 2 % — SIGNIFICANT CHANGE UP (ref 0–6)
GLUCOSE SERPL-MCNC: 133 MG/DL — HIGH (ref 70–99)
HCT VFR BLD CALC: 32.5 % — LOW (ref 34.5–45)
HGB BLD-MCNC: 10.3 G/DL — LOW (ref 11.5–15.5)
IMM GRANULOCYTES NFR BLD AUTO: 0.2 % — SIGNIFICANT CHANGE UP (ref 0–1.5)
INR BLD: 1.05 RATIO — SIGNIFICANT CHANGE UP (ref 0.88–1.16)
LYMPHOCYTES # BLD AUTO: 1.38 K/UL — SIGNIFICANT CHANGE UP (ref 1–3.3)
LYMPHOCYTES # BLD AUTO: 14.5 % — SIGNIFICANT CHANGE UP (ref 13–44)
MAGNESIUM SERPL-MCNC: 1.8 MG/DL — SIGNIFICANT CHANGE UP (ref 1.6–2.6)
MCHC RBC-ENTMCNC: 27.6 PG — SIGNIFICANT CHANGE UP (ref 27–34)
MCHC RBC-ENTMCNC: 31.7 GM/DL — LOW (ref 32–36)
MCV RBC AUTO: 87.1 FL — SIGNIFICANT CHANGE UP (ref 80–100)
MONOCYTES # BLD AUTO: 0.73 K/UL — SIGNIFICANT CHANGE UP (ref 0–0.9)
MONOCYTES NFR BLD AUTO: 7.7 % — SIGNIFICANT CHANGE UP (ref 2–14)
NEUTROPHILS # BLD AUTO: 7.16 K/UL — SIGNIFICANT CHANGE UP (ref 1.8–7.4)
NEUTROPHILS NFR BLD AUTO: 75.3 % — SIGNIFICANT CHANGE UP (ref 43–77)
PHOSPHATE SERPL-MCNC: 2.7 MG/DL — SIGNIFICANT CHANGE UP (ref 2.5–4.5)
PLATELET # BLD AUTO: 369 K/UL — SIGNIFICANT CHANGE UP (ref 150–400)
POTASSIUM SERPL-MCNC: 3.7 MMOL/L — SIGNIFICANT CHANGE UP (ref 3.5–5.3)
POTASSIUM SERPL-SCNC: 3.7 MMOL/L — SIGNIFICANT CHANGE UP (ref 3.5–5.3)
PROTHROM AB SERPL-ACNC: 12 SEC — SIGNIFICANT CHANGE UP (ref 10–13.1)
PTH RELATED PROT SERPL-MCNC: <2 PMOL/L — SIGNIFICANT CHANGE UP
RBC # BLD: 3.73 M/UL — LOW (ref 3.8–5.2)
RBC # FLD: 15 % — HIGH (ref 10.3–14.5)
SODIUM SERPL-SCNC: 137 MMOL/L — SIGNIFICANT CHANGE UP (ref 135–145)
WBC # BLD: 9.51 K/UL — SIGNIFICANT CHANGE UP (ref 3.8–10.5)
WBC # FLD AUTO: 9.51 K/UL — SIGNIFICANT CHANGE UP (ref 3.8–10.5)

## 2018-11-08 PROCEDURE — 84100 ASSAY OF PHOSPHORUS: CPT

## 2018-11-08 PROCEDURE — 71250 CT THORAX DX C-: CPT

## 2018-11-08 PROCEDURE — 82310 ASSAY OF CALCIUM: CPT

## 2018-11-08 PROCEDURE — 97161 PT EVAL LOW COMPLEX 20 MIN: CPT

## 2018-11-08 PROCEDURE — 80048 BASIC METABOLIC PNL TOTAL CA: CPT

## 2018-11-08 PROCEDURE — 71045 X-RAY EXAM CHEST 1 VIEW: CPT

## 2018-11-08 PROCEDURE — 87184 SC STD DISK METHOD PER PLATE: CPT

## 2018-11-08 PROCEDURE — 85014 HEMATOCRIT: CPT

## 2018-11-08 PROCEDURE — 87075 CULTR BACTERIA EXCEPT BLOOD: CPT

## 2018-11-08 PROCEDURE — 82306 VITAMIN D 25 HYDROXY: CPT

## 2018-11-08 PROCEDURE — 84295 ASSAY OF SERUM SODIUM: CPT

## 2018-11-08 PROCEDURE — 85652 RBC SED RATE AUTOMATED: CPT

## 2018-11-08 PROCEDURE — 87205 SMEAR GRAM STAIN: CPT

## 2018-11-08 PROCEDURE — 93970 EXTREMITY STUDY: CPT

## 2018-11-08 PROCEDURE — 87070 CULTURE OTHR SPECIMN AEROBIC: CPT

## 2018-11-08 PROCEDURE — 36569 INSJ PICC 5 YR+ W/O IMAGING: CPT

## 2018-11-08 PROCEDURE — 87486 CHLMYD PNEUM DNA AMP PROBE: CPT

## 2018-11-08 PROCEDURE — 96375 TX/PRO/DX INJ NEW DRUG ADDON: CPT

## 2018-11-08 PROCEDURE — 85027 COMPLETE CBC AUTOMATED: CPT

## 2018-11-08 PROCEDURE — 74176 CT ABD & PELVIS W/O CONTRAST: CPT

## 2018-11-08 PROCEDURE — 83605 ASSAY OF LACTIC ACID: CPT

## 2018-11-08 PROCEDURE — 87040 BLOOD CULTURE FOR BACTERIA: CPT

## 2018-11-08 PROCEDURE — 87581 M.PNEUMON DNA AMP PROBE: CPT

## 2018-11-08 PROCEDURE — 81001 URINALYSIS AUTO W/SCOPE: CPT

## 2018-11-08 PROCEDURE — 87150 DNA/RNA AMPLIFIED PROBE: CPT

## 2018-11-08 PROCEDURE — 96374 THER/PROPH/DIAG INJ IV PUSH: CPT

## 2018-11-08 PROCEDURE — 87633 RESP VIRUS 12-25 TARGETS: CPT

## 2018-11-08 PROCEDURE — 10030 IMG GID FLU COLL DRG SFT TIS: CPT

## 2018-11-08 PROCEDURE — 85730 THROMBOPLASTIN TIME PARTIAL: CPT

## 2018-11-08 PROCEDURE — 72148 MRI LUMBAR SPINE W/O DYE: CPT

## 2018-11-08 PROCEDURE — C1769: CPT

## 2018-11-08 PROCEDURE — 99239 HOSP IP/OBS DSCHRG MGMT >30: CPT

## 2018-11-08 PROCEDURE — C1729: CPT

## 2018-11-08 PROCEDURE — 99285 EMERGENCY DEPT VISIT HI MDM: CPT | Mod: 25

## 2018-11-08 PROCEDURE — 83970 ASSAY OF PARATHORMONE: CPT

## 2018-11-08 PROCEDURE — 99232 SBSQ HOSP IP/OBS MODERATE 35: CPT

## 2018-11-08 PROCEDURE — 82330 ASSAY OF CALCIUM: CPT

## 2018-11-08 PROCEDURE — 85610 PROTHROMBIN TIME: CPT

## 2018-11-08 PROCEDURE — 82435 ASSAY OF BLOOD CHLORIDE: CPT

## 2018-11-08 PROCEDURE — 87086 URINE CULTURE/COLONY COUNT: CPT

## 2018-11-08 PROCEDURE — 82947 ASSAY GLUCOSE BLOOD QUANT: CPT

## 2018-11-08 PROCEDURE — 80053 COMPREHEN METABOLIC PANEL: CPT

## 2018-11-08 PROCEDURE — 97116 GAIT TRAINING THERAPY: CPT

## 2018-11-08 PROCEDURE — 83519 RIA NONANTIBODY: CPT

## 2018-11-08 PROCEDURE — 83735 ASSAY OF MAGNESIUM: CPT

## 2018-11-08 PROCEDURE — 97530 THERAPEUTIC ACTIVITIES: CPT

## 2018-11-08 PROCEDURE — 73552 X-RAY EXAM OF FEMUR 2/>: CPT

## 2018-11-08 PROCEDURE — 71045 X-RAY EXAM CHEST 1 VIEW: CPT | Mod: 26

## 2018-11-08 PROCEDURE — 84132 ASSAY OF SERUM POTASSIUM: CPT

## 2018-11-08 PROCEDURE — 93005 ELECTROCARDIOGRAM TRACING: CPT

## 2018-11-08 PROCEDURE — 87798 DETECT AGENT NOS DNA AMP: CPT

## 2018-11-08 PROCEDURE — 82803 BLOOD GASES ANY COMBINATION: CPT

## 2018-11-08 PROCEDURE — 73502 X-RAY EXAM HIP UNI 2-3 VIEWS: CPT

## 2018-11-08 PROCEDURE — C1751: CPT

## 2018-11-08 PROCEDURE — 86140 C-REACTIVE PROTEIN: CPT

## 2018-11-08 RX ORDER — PANTOPRAZOLE SODIUM 20 MG/1
1 TABLET, DELAYED RELEASE ORAL
Qty: 0 | Refills: 0 | COMMUNITY

## 2018-11-08 RX ADMIN — Medication 650 MILLIGRAM(S): at 18:14

## 2018-11-08 RX ADMIN — Medication 100 MILLIGRAM(S): at 05:10

## 2018-11-08 RX ADMIN — HEPARIN SODIUM 5000 UNIT(S): 5000 INJECTION INTRAVENOUS; SUBCUTANEOUS at 17:21

## 2018-11-08 RX ADMIN — CEFTRIAXONE 100 GRAM(S): 500 INJECTION, POWDER, FOR SOLUTION INTRAMUSCULAR; INTRAVENOUS at 13:19

## 2018-11-08 RX ADMIN — HEPARIN SODIUM 5000 UNIT(S): 5000 INJECTION INTRAVENOUS; SUBCUTANEOUS at 05:10

## 2018-11-08 RX ADMIN — Medication 100 MILLIGRAM(S): at 13:14

## 2018-11-08 RX ADMIN — GABAPENTIN 100 MILLIGRAM(S): 400 CAPSULE ORAL at 13:14

## 2018-11-08 RX ADMIN — AMLODIPINE BESYLATE 2.5 MILLIGRAM(S): 2.5 TABLET ORAL at 05:10

## 2018-11-08 RX ADMIN — Medication 650 MILLIGRAM(S): at 18:45

## 2018-11-08 RX ADMIN — TRAMADOL HYDROCHLORIDE 25 MILLIGRAM(S): 50 TABLET ORAL at 06:44

## 2018-11-08 RX ADMIN — CARVEDILOL PHOSPHATE 6.25 MILLIGRAM(S): 80 CAPSULE, EXTENDED RELEASE ORAL at 17:21

## 2018-11-08 RX ADMIN — GABAPENTIN 100 MILLIGRAM(S): 400 CAPSULE ORAL at 05:10

## 2018-11-08 RX ADMIN — CARVEDILOL PHOSPHATE 6.25 MILLIGRAM(S): 80 CAPSULE, EXTENDED RELEASE ORAL at 05:10

## 2018-11-08 NOTE — PROGRESS NOTE ADULT - ASSESSMENT
Assessment/plan:    Bilateral superfical heel wounds --non infected    Continue with normal saline cleanse with aquacell/adaptic touch and dsd daily  recommend z offloading heel boots  Recommend evaluation of outpatient shoegear.  cherrie mcintosh for infection and continued wound care management  thank you for consult

## 2018-11-08 NOTE — PROGRESS NOTE ADULT - SUBJECTIVE AND OBJECTIVE BOX
Podiatry pager #: [phone]    Patient is a 89y old  Female who presents with a chief complaint of Osteomyelitis (08 Nov 2018 13:13)      HPI:  Pt is a 89F with PMHx of mild dementia, CAD, HLD, HTN, spinal stenosis who p/w left hip pain and elevated WBC on outpatient labs. Pt presented to PMD's office yesterday with complaint of left hip pain that had been progressing for 5 days. She has a known sacral decubitus ulcer. The pain is localized to her left hip, is a constant dull ache. Sitting for prolonged period of time makes the pain worse. she denies numbness or tingling in her LLE. She had a total hip replacement "years" ago in [place], she cannot recall the name of her surgeon. She denies current or recent hx of F/C/N/V, CP/SOB, dysuria, constipation/diarrhea. No recent trauma, travel, or sick contacts. Her PMD prescribed her 20mg of prednisone for 3 days and referred her to ortho. She was called back today to go to the emergency room after CBC showed elevated WBC count. Podiatry consulted for b/l heel wounds    In ED, initial VS were T98.2 HR60 /62 RR18 SaO2 94% on RA. Labs notable for WBC count of 20.1, creatinine of 1.4 (baseline 1.4 - 1.5) (02 Nov 2018 22:33)      PAST MEDICAL & SURGICAL HISTORY:  Atherosclerotic cardiovascular disease  Spinal stenosis  Hyperlipidemia  Hypertension  History of left hip replacement      MEDICATIONS  (STANDING):  amLODIPine   Tablet 2.5 milliGRAM(s) Oral daily  atorvastatin 40 milliGRAM(s) Oral at bedtime  carvedilol 6.25 milliGRAM(s) Oral every 12 hours  cefTRIAXone   IVPB      cefTRIAXone   IVPB 2 Gram(s) IV Intermittent every 24 hours  docusate sodium 100 milliGRAM(s) Oral three times a day  gabapentin 100 milliGRAM(s) Oral three times a day  heparin  Injectable 5000 Unit(s) SubCutaneous every 12 hours  senna 2 Tablet(s) Oral at bedtime    MEDICATIONS  (PRN):  acetaminophen   Tablet .. 650 milliGRAM(s) Oral every 6 hours PRN Mild Pain (1 - 3), Moderate Pain (4 - 6)  traMADol 25 milliGRAM(s) Oral every 6 hours PRN Severe Pain (7 - 10)      Allergies    penicillin (Unknown)    Intolerances        VITALS:    Vital Signs Last 24 Hrs  T(C): 37.1 (08 Nov 2018 11:35), Max: 37.2 (07 Nov 2018 21:42)  T(F): 98.7 (08 Nov 2018 11:35), Max: 99 (07 Nov 2018 21:42)  HR: 72 (08 Nov 2018 17:21) (62 - 73)  BP: 144/68 (08 Nov 2018 17:21) (144/68 - 162/80)  BP(mean): --  RR: 18 (08 Nov 2018 11:35) (18 - 18)  SpO2: 95% (08 Nov 2018 11:35) (95% - 95%)    LABS:                          10.3   9.51  )-----------( 369      ( 08 Nov 2018 08:13 )             32.5       11-08    137  |  105  |  15  ----------------------------<  133<H>  3.7   |  21<L>  |  0.91    Ca    9.8      08 Nov 2018 06:20  Phos  2.7     11-08  Mg     1.8     11-08        CAPILLARY BLOOD GLUCOSE          PT/INR - ( 08 Nov 2018 08:18 )   PT: 12.0 sec;   INR: 1.05 ratio         PTT - ( 08 Nov 2018 08:18 )  PTT:27.5 sec    LOWER EXTREMITY PHYSICAL EXAM:    Vasular: DP/PT _1/4, B/L, CFT <2_ seconds B/L, Temperature gradient wnl_, B/L.   Neuro: Epicritic sensation _intact to the level of _toes, B/L.  Musculoskeletal/Ortho:  Skin:  Wound #1: heels  Location: bilateral plantar aspect  Size: 2cmm diameter  Depth:2mm  Wound bed: granular after ruptured bullae  Drainage: none  Odor: none  Periwound:mimimal erythema  Etiology: bullae

## 2018-11-08 NOTE — PROGRESS NOTE ADULT - ATTENDING COMMENTS
Patient seen and examined. She feels well today. No new complaints. s/p PICC line this afternoon without complication, as BCX negative. Case d/w ID (Dr. Ceron). Stable for D/C home this evening w/ 6 weeks ceftriaxone therapy, as above, w/ lab monitoring and IR and ID f/u.    Plan d/w patient's family, left message for PCP (Dr. Grissom) today.    Discharge planning time 36 minutes.    Wilmer Michelle M.D.  Hospitalist  Pager: [phone]

## 2018-11-08 NOTE — PROGRESS NOTE ADULT - PROBLEM SELECTOR PLAN 1
Left hip abscess aspirated by IR (11/3) in setting of L hip hardware  -abscess culture grew GBS  -repeat BCx2 negative 48hrs so PICC order placed  -c/w ceftriaxone 2 g IV daily until 12/18 as per ID  -IR long-term plan for drain is to schedule follow-up as outpt when drain output is 10cc or less for repeat CT and tube study (contrast+xray). Number is [phone]  -as per ID, PICC and abx risks discussed. Weekly cbc, cmp, esr, crp (fax: [phone]). f/u in 6 weeks ([phone])  -MRI negative for osteomyelitis 2/2 sacral decubitus ulcer

## 2018-11-08 NOTE — PROGRESS NOTE ADULT - ATTENDING COMMENTS
Sam Ceron  Attending Physician   Division of Infectious Disease  Pager #[phone]  After 5pm/weekend or no response, call #[phone]

## 2018-11-08 NOTE — PROGRESS NOTE ADULT - SUBJECTIVE AND OBJECTIVE BOX
Patient is a 89y old  Female who presents with a chief complaint of Osteomyelitis (07 Nov 2018 13:53)      INTERVAL HPI/OVERNIGHT EVENTS: NARAYAN overnight. Pt endorses continually decreasing pain/discomfort of L hip abscess draining site which is C/D/I. 400 output from bulb. States she has been sleeping better. Denies F/C/NS/N/V/D/CP/Palpitations/SOB/Cough. BM regular.    MEDICATIONS (STANDING):  amLODIPine   Tablet 2.5 milliGRAM(s) Oral daily  atorvastatin 40 milliGRAM(s) Oral at bedtime  carvedilol 6.25 milliGRAM(s) Oral every 12 hours  cefTRIAXone   IVPB      cefTRIAXone   IVPB 2 Gram(s) IV Intermittent every 24 hours  docusate sodium 100 milliGRAM(s) Oral three times a day  gabapentin 100 milliGRAM(s) Oral three times a day  heparin  Injectable 5000 Unit(s) SubCutaneous every 12 hours  senna 2 Tablet(s) Oral at bedtime    MEDICATIONS  (PRN):  acetaminophen   Tablet .. 650 milliGRAM(s) Oral every 6 hours PRN  traMADol 25 milliGRAM(s) Oral every 6 hours PRN      REVIEW OF SYSTEMS:  CONSTITUTIONAL: No fever, weight loss, or fatigue  EYES: No eye pain, visual disturbances, or discharge  ENMT:  Difficulty hearing but no tinnitus, vertigo; No sinus or throat pain  NECK: No pain or stiffness  RESPIRATORY: No cough, wheezing, chills or hemoptysis; No shortness of breath  CARDIOVASCULAR: No chest pain, palpitations, dizziness, or leg swelling  GASTROINTESTINAL: No abdominal or epigastric pain. No nausea, vomiting, or hematemesis; No diarrhea or constipation. No melena or hematochezia.  GENITOURINARY: No dysuria, frequency, hematuria, or incontinence  NEUROLOGICAL: No headaches, memory loss, loss of strength, numbness, or tremors  SKIN: No itching, burning, rashes, or lesions   MUSCULOSKELETAL: No joint pain or swelling; No muscle, back, or extremity pain; Decreased pain of L hip.    T(F): 98.2 (11-08-18 @ 04:57), Max: 99 (11-07-18 @ 21:42)  HR: 62 (11-08-18 @ 04:57) (62 - 73)  BP: 162/80 (11-08-18 @ 04:57) (158/74 - 162/80)  RR: 18 (11-08-18 @ 04:57) (18 - 18)  SpO2: 95% (11-08-18 @ 04:57) (95% - 95%)  Wt(kg): --  CAPILLARY BLOOD GLUCOSE        I&O's Summary    07 Nov 2018 07:01  -  08 Nov 2018 07:00  --------------------------------------------------------  IN: 860 mL / OUT: 765 mL / NET: 95 mL        PHYSICAL EXAM:  GENERAL: NAD, well-groomed, well-developed  HEAD:  Atraumatic, Normocephalic  EYES: EOMI, PERRLA, conjunctiva and sclera clear  ENMT: No tonsillar erythema, exudates, or enlargement; Moist mucous membranes  NECK: Supple, No JVD, Normal thyroid  NERVOUS SYSTEM:  Alert & Oriented X2 (not time), Good concentration; Motor Strength 5/5 B/L upper and lower extremities  CHEST/LUNG: Clear to percussion bilaterally; No rales, rhonchi, wheezing, or rubs  HEART: Regular rate and rhythm; No murmurs, rubs, or gallops  ABDOMEN: Soft, Nontender, Nondistended; Bowel sounds present  EXTREMITIES:  2+ Peripheral Pulses, No clubbing, cyanosis, or edema, L hip drainage site C/D/I without erythema, edema, or pain to palpation.  LYMPH: No lymphadenopathy noted  SKIN: No rashes or lesions    LABS:                        10.3   9.51  )-----------( 369      ( 08 Nov 2018 08:13 )             32.5     11-08    137  |  105  |  15  ----------------------------<  133<H>  3.7   |  21<L>  |  0.91    Ca    9.8      08 Nov 2018 06:20  Phos  2.7     11-08  Mg     1.8     11-08      PT/INR - ( 07 Nov 2018 11:07 )   PT: 12.4 sec;   INR: 1.08 ratio         PTT - ( 07 Nov 2018 11:07 )  PTT:27.7 sec Patient is a 89y old  Female who presents with a chief complaint of L hip pain (07 Nov 2018 13:53)      INTERVAL HPI/OVERNIGHT EVENTS: NARAYAN overnight. Pt endorses continually decreasing pain/discomfort of L hip abscess draining site which is C/D/I. 400 output from bulb. States she has been sleeping better. Denies F/C/NS/N/V/D/CP/Palpitations/SOB/Cough. BM regular.    MEDICATIONS (STANDING):  amLODIPine   Tablet 2.5 milliGRAM(s) Oral daily  atorvastatin 40 milliGRAM(s) Oral at bedtime  carvedilol 6.25 milliGRAM(s) Oral every 12 hours  cefTRIAXone   IVPB      cefTRIAXone   IVPB 2 Gram(s) IV Intermittent every 24 hours  docusate sodium 100 milliGRAM(s) Oral three times a day  gabapentin 100 milliGRAM(s) Oral three times a day  heparin  Injectable 5000 Unit(s) SubCutaneous every 12 hours  senna 2 Tablet(s) Oral at bedtime    MEDICATIONS  (PRN):  acetaminophen   Tablet .. 650 milliGRAM(s) Oral every 6 hours PRN  traMADol 25 milliGRAM(s) Oral every 6 hours PRN      REVIEW OF SYSTEMS:  CONSTITUTIONAL: No fever, weight loss, or fatigue  EYES: No eye pain, visual disturbances, or discharge  ENMT:  Difficulty hearing but no tinnitus, vertigo; No sinus or throat pain  NECK: No pain or stiffness  RESPIRATORY: No cough, wheezing, chills or hemoptysis; No shortness of breath  CARDIOVASCULAR: No chest pain, palpitations, dizziness, or leg swelling  GASTROINTESTINAL: No abdominal or epigastric pain. No nausea, vomiting, or hematemesis; No diarrhea or constipation. No melena or hematochezia.  GENITOURINARY: No dysuria, frequency, hematuria, or incontinence  NEUROLOGICAL: No headaches, memory loss, loss of strength, numbness, or tremors  SKIN: No itching, burning, rashes, or lesions   MUSCULOSKELETAL: No joint pain or swelling; No muscle, back, or extremity pain; Decreased pain of L hip.    T(F): 98.2 (11-08-18 @ 04:57), Max: 99 (11-07-18 @ 21:42)  HR: 62 (11-08-18 @ 04:57) (62 - 73)  BP: 162/80 (11-08-18 @ 04:57) (158/74 - 162/80)  RR: 18 (11-08-18 @ 04:57) (18 - 18)  SpO2: 95% (11-08-18 @ 04:57) (95% - 95%)  Wt(kg): --  CAPILLARY BLOOD GLUCOSE        I&O's Summary    07 Nov 2018 07:01  -  08 Nov 2018 07:00  --------------------------------------------------------  IN: 860 mL / OUT: 765 mL / NET: 95 mL        PHYSICAL EXAM:  GENERAL: NAD, well-groomed, well-developed  HEAD:  Atraumatic, Normocephalic  EYES: EOMI, PERRLA, conjunctiva and sclera clear  ENMT: No tonsillar erythema, exudates, or enlargement; Moist mucous membranes  NECK: Supple, No JVD, Normal thyroid  NERVOUS SYSTEM:  Alert & Oriented X2 (not time), Good concentration; Motor Strength 5/5 B/L upper and lower extremities  CHEST/LUNG: Clear to percussion bilaterally; No rales, rhonchi, wheezing, or rubs  HEART: Regular rate and rhythm; No murmurs, rubs, or gallops  ABDOMEN: Soft, Nontender, Nondistended; Bowel sounds present  EXTREMITIES:  2+ Peripheral Pulses, No clubbing, cyanosis, or edema, L hip drainage site C/D/I without erythema, edema, or pain to palpation.  LYMPH: No lymphadenopathy noted  SKIN: No rashes or lesions    LABS:                        10.3   9.51  )-----------( 369      ( 08 Nov 2018 08:13 )             32.5     11-08    137  |  105  |  15  ----------------------------<  133<H>  3.7   |  21<L>  |  0.91    Ca    9.8      08 Nov 2018 06:20  Phos  2.7     11-08  Mg     1.8     11-08      PT/INR - ( 07 Nov 2018 11:07 )   PT: 12.4 sec;   INR: 1.08 ratio Patient is a 89y old  Female who presents with a chief complaint of L hip pain (07 Nov 2018 13:53)      INTERVAL HPI/OVERNIGHT EVENTS: NARAYAN overnight. Pt endorses continually decreasing pain/discomfort of L hip abscess draining site which is C/D/I. 400 output from bulb. States she has been sleeping better. Denies F/C/NS/N/V/D/CP/Palpitations/SOB/Cough. BM regular.    MEDICATIONS (STANDING):  amLODIPine   Tablet 2.5 milliGRAM(s) Oral daily  atorvastatin 40 milliGRAM(s) Oral at bedtime  carvedilol 6.25 milliGRAM(s) Oral every 12 hours  cefTRIAXone   IVPB      cefTRIAXone   IVPB 2 Gram(s) IV Intermittent every 24 hours  docusate sodium 100 milliGRAM(s) Oral three times a day  gabapentin 100 milliGRAM(s) Oral three times a day  heparin  Injectable 5000 Unit(s) SubCutaneous every 12 hours  senna 2 Tablet(s) Oral at bedtime    MEDICATIONS  (PRN):  acetaminophen   Tablet .. 650 milliGRAM(s) Oral every 6 hours PRN  traMADol 25 milliGRAM(s) Oral every 6 hours PRN      REVIEW OF SYSTEMS:  CONSTITUTIONAL: No fever, weight loss, or fatigue  EYES: No eye pain, visual disturbances, or discharge  ENMT:  Difficulty hearing but no tinnitus, vertigo; No sinus or throat pain  NECK: No pain or stiffness  RESPIRATORY: No cough, wheezing, chills or hemoptysis; No shortness of breath  CARDIOVASCULAR: No chest pain, palpitations, dizziness, or leg swelling  GASTROINTESTINAL: No abdominal or epigastric pain. No nausea, vomiting, or hematemesis; No diarrhea or constipation. No melena or hematochezia.  GENITOURINARY: No dysuria, frequency, hematuria, or incontinence  NEUROLOGICAL: No headaches, memory loss, loss of strength, numbness, or tremors  SKIN: No itching, burning, rashes, or lesions   MUSCULOSKELETAL: No joint pain or swelling; No muscle, back, or extremity pain; Decreased pain of L hip.    T(F): 98.2 (11-08-18 @ 04:57), Max: 99 (11-07-18 @ 21:42)  HR: 62 (11-08-18 @ 04:57) (62 - 73)  BP: 162/80 (11-08-18 @ 04:57) (158/74 - 162/80)  RR: 18 (11-08-18 @ 04:57) (18 - 18)  SpO2: 95% (11-08-18 @ 04:57) (95% - 95%)  Wt(kg): --    I&O's Summary    07 Nov 2018 07:01  -  08 Nov 2018 07:00  --------------------------------------------------------  IN: 860 mL / OUT: 765 mL / NET: 95 mL        PHYSICAL EXAM:  GENERAL: NAD, well-groomed, well-developed  HEAD:  Atraumatic, Normocephalic  EYES: EOMI, PERRLA, conjunctiva and sclera clear  ENMT: No tonsillar erythema, exudates, or enlargement; Moist mucous membranes  NECK: Supple, No JVD, Normal thyroid  NERVOUS SYSTEM:  Alert & Oriented X2 (not time), Good concentration; Motor Strength 5/5 B/L upper and lower extremities  CHEST/LUNG: Clear to percussion bilaterally; No rales, rhonchi, wheezing, or rubs  HEART: Regular rate and rhythm; No murmurs, rubs, or gallops  ABDOMEN: Soft, Nontender, Nondistended; Bowel sounds present  EXTREMITIES:  2+ Peripheral Pulses, No clubbing, cyanosis, or edema, L hip drainage site C/D/I without erythema, edema, or pain to palpation.  LYMPH: No lymphadenopathy noted  SKIN: No rashes or lesions    LABS:                        10.3   9.51  )-----------( 369      ( 08 Nov 2018 08:13 )             32.5     11-08    137  |  105  |  15  ----------------------------<  133<H>  3.7   |  21<L>  |  0.91    Ca    9.8      08 Nov 2018 06:20  Phos  2.7     11-08  Mg     1.8     11-08      PT/INR - ( 07 Nov 2018 11:07 )   PT: 12.4 sec;   INR: 1.08 ratio

## 2018-11-08 NOTE — PROGRESS NOTE ADULT - ASSESSMENT
89F with PMHx of mild dementia, CAD, HLD, HTN, spinal stenosis who p/w left hip pain and periprosthetic abscess now on ceftriaxone q24, tolerating well.  Pt had MRI yest showing now evidence of osteo.    Recs:  -c/w ceftriaxone 2 g q24: pt is tolerating well  -pt will need 6 weeks IV abx followed by lifelong oral PO suppression  -Pt will eventually need PICC  -potential side effects of PICC explained including bleeding and infection  -potential side effects of abx explained including GI, Cdiff, allergy issues, development of resistance etc.  -check weekly cbc, cmp, esr, crp - fax [phone]  -f/u in ID office in 6 weeks [phone]

## 2018-11-08 NOTE — PROGRESS NOTE ADULT - PROVIDER SPECIALTY LIST ADULT
Infectious Disease
Infectious Disease
Internal Medicine
Intervent Radiology
Orthopedics
Podiatry
Infectious Disease
Internal Medicine

## 2018-11-08 NOTE — PROGRESS NOTE ADULT - PROBLEM SELECTOR PLAN 3
New finding on CT chest concerning for primary lung CA. Extensive d/w patient's grandson on admission (HCP are daughters, Erin and Manuela, who are not present). Unlikely would want further workup of lung mass, also confirmed this w/ Erin today.   -Pt DNR/DNI  -Pt's medical decision maker does not wish to pursue this during this admission. New finding on CT chest concerning for primary lung CA. Extensive d/w patient's grandson on admission (HCP are daughters, Erin and Manuela, who are not present). Unlikely would want further workup of lung mass, also confirmed this w/ Erin yesterday and patient's PCP (Dr. Grissom)  -Pt DNR/DNI  -Pt's medical decision maker does not wish to pursue this during this admission.

## 2018-11-08 NOTE — PROGRESS NOTE ADULT - SUBJECTIVE AND OBJECTIVE BOX
NICOLAS BAUTISTA 89y [id number]    Patient is a 89y old  Female who presents with a chief complaint of L Hip Abscess Collection (08 Nov 2018 09:12)      Follow Up/CC:  ID following for left PJI    Interval History/ROS: no acute issues    Allergies    penicillin (Unknown)    Intolerances        ANTIMICROBIALS:  cefTRIAXone   IVPB    cefTRIAXone   IVPB 2 every 24 hours      MEDICATIONS  (STANDING):  amLODIPine   Tablet 2.5 milliGRAM(s) Oral daily  atorvastatin 40 milliGRAM(s) Oral at bedtime  carvedilol 6.25 milliGRAM(s) Oral every 12 hours  cefTRIAXone   IVPB      cefTRIAXone   IVPB 2 Gram(s) IV Intermittent every 24 hours  docusate sodium 100 milliGRAM(s) Oral three times a day  gabapentin 100 milliGRAM(s) Oral three times a day  heparin  Injectable 5000 Unit(s) SubCutaneous every 12 hours  senna 2 Tablet(s) Oral at bedtime    MEDICATIONS  (PRN):  acetaminophen   Tablet .. 650 milliGRAM(s) Oral every 6 hours PRN Mild Pain (1 - 3), Moderate Pain (4 - 6)  traMADol 25 milliGRAM(s) Oral every 6 hours PRN Severe Pain (7 - 10)        Vital Signs Last 24 Hrs  T(C): 37.1 (08 Nov 2018 11:35), Max: 37.2 (07 Nov 2018 21:42)  T(F): 98.7 (08 Nov 2018 11:35), Max: 99 (07 Nov 2018 21:42)  HR: 70 (08 Nov 2018 11:35) (62 - 73)  BP: 152/78 (08 Nov 2018 11:35) (152/78 - 162/80)  BP(mean): --  RR: 18 (08 Nov 2018 11:35) (18 - 18)  SpO2: 95% (08 Nov 2018 11:35) (95% - 95%)    CBC Full  -  ( 08 Nov 2018 08:13 )  WBC Count : 9.51 K/uL  Hemoglobin : 10.3 g/dL  Hematocrit : 32.5 %  Platelet Count - Automated : 369 K/uL  Mean Cell Volume : 87.1 fl  Mean Cell Hemoglobin : 27.6 pg  Mean Cell Hemoglobin Concentration : 31.7 gm/dL  Auto Neutrophil # : 7.16 K/uL  Auto Lymphocyte # : 1.38 K/uL  Auto Monocyte # : 0.73 K/uL  Auto Eosinophil # : 0.19 K/uL  Auto Basophil # : 0.03 K/uL  Auto Neutrophil % : 75.3 %  Auto Lymphocyte % : 14.5 %  Auto Monocyte % : 7.7 %  Auto Eosinophil % : 2.0 %  Auto Basophil % : 0.3 %    11-08    137  |  105  |  15  ----------------------------<  133<H>  3.7   |  21<L>  |  0.91    Ca    9.8      08 Nov 2018 06:20  Phos  2.7     11-08  Mg     1.8     11-08            MICROBIOLOGY:  .Blood Blood  11-06-18   No growth to date.  --  --      .Body Fluid Interstitial Fluid  11-03-18   Moderate Streptococcus agalactiae (Group B)  --  Streptococcus agalactiae (Group B)      .Urine Clean Catch (Midstream)  11-03-18   No growth  --  --      .Blood Blood-Peripheral  11-02-18   Growth in aerobic bottle: Coag Negative Staphylococcus "Susceptibilities  not performed"  "Due to technical problems, Proteus sp. will Not be reported as part of  the BCID panel until further notice"  ***Blood Panel PCR results on this specimen areavailable  approximately 3 hours after the Gram stain result.***  Gram stain, PCR, and/or culture results may not always  correspond due to difference in methodologies.  ************************************************************  This PCR assay wasperformed using [employer].  The following targets are tested for: Enterococcus,  vancomycin resistant enterococci, Listeria monocytogenes,  coagulase negative staphylococci, S. aureus,  methicillin resistant S. aureus, Streptococcus agalactiae  (Group B), S. pneumoniae, S. pyogenes (Group A),  Acinetobacter baumannii, Enterobacter cloacae, E. coli,  Klebsiella oxytoca, K. pneumoniae, Proteus sp.,  Serratia marcescens, Haemophilus influenzae,  Neisseria meningitidis, Pseudomonas aeruginosa, Candida  albicans, C. glabrata, C krusei, C parapsilosis,  C. tropicalis and the KPC resistance gene.  --  Blood Culture PCR        Rapid RVP Result: NotDetec (11-02 @ 16:49)      RADIOLOGY    < from: Xray Chest 1 View- PORTABLE-Urgent (11.08.18 @ 13:34) >  Tip of the right PICC within the SVC.    Increased patchy opacity within the right upper lobe. Increased left   basilar atelectasis. No pneumothorax. Small left pleural effusion   suspected.    < end of copied text >

## 2018-11-08 NOTE — PROGRESS NOTE ADULT - ASSESSMENT
89F with PMHx of mild dementia, CAD, HLD, HTN, spinal stenosis who p/w left hip pain and elevated WBC on outpatient labs found to have osteomyelitis, large fluid collection in left hip, and an incidental new RUL lung mass concerning for primary lung CA. 89F with PMHx of mild dementia, CAD, HLD, HTN, spinal stenosis who p/w left hip pain and elevated WBC on outpatient labs found to have large fluid collection in left hip, and an incidental new RUL lung mass concerning for primary lung CA.

## 2018-11-08 NOTE — PROGRESS NOTE ADULT - PROBLEM SELECTOR PLAN 2
Pt reportedly takes carvedilol, amlodipine, and torsemide as outpatient   -c/w home amlodipine and carvediolol  -holding torsemide in setting Echo from 1/2018 showed EF60%, normal LVSF and mild concentric LVH  -systolic trending upwards 162; will continue to monitor Pt reportedly takes carvedilol, amlodipine, and torsemide as outpatient   -c/w home amlodipine and carvediolol  -holding torsemide in setting Echo from 1/2018 showed EF60%, normal LVSF and mild concentric LVH  -SBP ~150s, appropriate given her age and medical comorbidities

## 2018-11-11 LAB
CULTURE RESULTS: SIGNIFICANT CHANGE UP
CULTURE RESULTS: SIGNIFICANT CHANGE UP
SPECIMEN SOURCE: SIGNIFICANT CHANGE UP
SPECIMEN SOURCE: SIGNIFICANT CHANGE UP

## 2018-11-17 LAB
CULTURE RESULTS: SIGNIFICANT CHANGE UP
ORGANISM # SPEC MICROSCOPIC CNT: SIGNIFICANT CHANGE UP
ORGANISM # SPEC MICROSCOPIC CNT: SIGNIFICANT CHANGE UP
SPECIMEN SOURCE: SIGNIFICANT CHANGE UP

## 2018-11-30 ENCOUNTER — APPOINTMENT (OUTPATIENT)
Dept: INTERNAL MEDICINE | Facility: CLINIC | Age: 83
End: 2018-11-30

## 2018-12-03 ENCOUNTER — RX RENEWAL (OUTPATIENT)
Age: 83
End: 2018-12-03

## 2018-12-06 PROBLEM — E78.5 HYPERLIPIDEMIA, UNSPECIFIED: Chronic | Status: ACTIVE | Noted: 2018-11-02

## 2018-12-06 PROBLEM — M48.00 SPINAL STENOSIS, SITE UNSPECIFIED: Chronic | Status: ACTIVE | Noted: 2018-11-02

## 2018-12-06 PROBLEM — I10 ESSENTIAL (PRIMARY) HYPERTENSION: Chronic | Status: ACTIVE | Noted: 2018-11-02

## 2018-12-07 ENCOUNTER — APPOINTMENT (OUTPATIENT)
Dept: INTERNAL MEDICINE | Facility: CLINIC | Age: 83
End: 2018-12-07
Payer: MEDICARE

## 2018-12-07 VITALS
WEIGHT: 143 LBS | SYSTOLIC BLOOD PRESSURE: 145 MMHG | HEIGHT: 62 IN | HEART RATE: 66 BPM | BODY MASS INDEX: 26.31 KG/M2 | DIASTOLIC BLOOD PRESSURE: 74 MMHG

## 2018-12-07 DIAGNOSIS — Z95.828 PRESENCE OF OTHER VASCULAR IMPLANTS AND GRAFTS: ICD-10-CM

## 2018-12-07 DIAGNOSIS — B99.9 UNSPECIFIED INFECTIOUS DISEASE: ICD-10-CM

## 2018-12-07 PROCEDURE — 99213 OFFICE O/P EST LOW 20 MIN: CPT | Mod: 25

## 2018-12-07 PROCEDURE — 36415 COLL VENOUS BLD VENIPUNCTURE: CPT

## 2018-12-07 RX ORDER — ZOSTER VACCINE RECOMBINANT, ADJUVANTED 50 MCG/0.5
50 KIT INTRAMUSCULAR
Qty: 2 | Refills: 0 | Status: DISCONTINUED | COMMUNITY
Start: 2018-06-26 | End: 2018-12-07

## 2018-12-07 RX ORDER — PREDNISONE 20 MG/1
20 TABLET ORAL
Qty: 3 | Refills: 1 | Status: DISCONTINUED | COMMUNITY
Start: 2018-11-01 | End: 2018-12-07

## 2018-12-07 RX ORDER — CARVEDILOL 6.25 MG/1
6.25 TABLET, FILM COATED ORAL
Qty: 180 | Refills: 3 | Status: DISCONTINUED | COMMUNITY
End: 2018-12-07

## 2018-12-07 RX ORDER — WALKER
EACH MISCELLANEOUS
Qty: 1 | Refills: 0 | Status: DISCONTINUED | OUTPATIENT
Start: 2018-04-10 | End: 2018-12-07

## 2018-12-07 NOTE — PHYSICAL EXAM
[No Acute Distress] : no acute distress [Well Nourished] : well nourished [Well Developed] : well developed [Normal Outer Ear/Nose] : the outer ears and nose were normal in appearance [Normal Oropharynx] : the oropharynx was normal [No Respiratory Distress] : no respiratory distress  [Clear to Auscultation] : lungs were clear to auscultation bilaterally [No Accessory Muscle Use] : no accessory muscle use [Normal Rate] : normal rate  [Regular Rhythm] : with a regular rhythm [Normal S1, S2] : normal S1 and S2 [Normal Affect] : the affect was normal [Normal Insight/Judgement] : insight and judgment were intact [de-identified] : mid back wound, stage 1 PU, two heel wounds(covered due to wound care) One TRES in Left Hip

## 2018-12-07 NOTE — ASSESSMENT
[FreeTextEntry1] : Keep wounds clean \par \par Cough: OTC agents, flonase, robitussin\par report any worsening\par \par will repeat labs

## 2018-12-07 NOTE — HISTORY OF PRESENT ILLNESS
[de-identified] : 89 year old female here today for a routine follow up. \par She was in the hospital and was discharged about three weeks ago. She still has a PICC line in the right arm. \par She has a drain in her left hip. \par She has multiple bed sores. She has sores on her buttocks and her feet. \par She is on ceftriaxone. \par \par She does have a cough since last week. \par It is mild. Denies fevers, chills. \par She is not SOB. \par SHe has a good appetite and is doing well according to the aide. \par

## 2018-12-10 ENCOUNTER — APPOINTMENT (OUTPATIENT)
Dept: PULMONOLOGY | Facility: CLINIC | Age: 83
End: 2018-12-10

## 2018-12-13 LAB
ALBUMIN SERPL ELPH-MCNC: 3.6 G/DL
ALP BLD-CCNC: 102 U/L
ALT SERPL-CCNC: 15 U/L
ANION GAP SERPL CALC-SCNC: 13 MMOL/L
AST SERPL-CCNC: 28 U/L
BASOPHILS # BLD AUTO: 0.05 K/UL
BASOPHILS NFR BLD AUTO: 0.5 %
BILIRUB SERPL-MCNC: 0.2 MG/DL
BUN SERPL-MCNC: 31 MG/DL
CALCIUM SERPL-MCNC: 10.6 MG/DL
CHLORIDE SERPL-SCNC: 103 MMOL/L
CO2 SERPL-SCNC: 24 MMOL/L
CREAT SERPL-MCNC: 1.14 MG/DL
EOSINOPHIL # BLD AUTO: 0.57 K/UL
EOSINOPHIL NFR BLD AUTO: 5.2 %
GLUCOSE SERPL-MCNC: 108 MG/DL
HCT VFR BLD CALC: 35.2 %
HGB BLD-MCNC: 10.7 G/DL
IMM GRANULOCYTES NFR BLD AUTO: 0.1 %
LYMPHOCYTES # BLD AUTO: 1.22 K/UL
LYMPHOCYTES NFR BLD AUTO: 11.1 %
MAN DIFF?: NORMAL
MCHC RBC-ENTMCNC: 27.4 PG
MCHC RBC-ENTMCNC: 30.4 GM/DL
MCV RBC AUTO: 90 FL
MONOCYTES # BLD AUTO: 0.6 K/UL
MONOCYTES NFR BLD AUTO: 5.4 %
NEUTROPHILS # BLD AUTO: 8.56 K/UL
NEUTROPHILS NFR BLD AUTO: 77.7 %
PLATELET # BLD AUTO: 475 K/UL
POTASSIUM SERPL-SCNC: 4.7 MMOL/L
PROT SERPL-MCNC: 7.3 G/DL
RBC # BLD: 3.91 M/UL
RBC # FLD: 16 %
SAVE SPECIMEN: NORMAL
SODIUM SERPL-SCNC: 140 MMOL/L
WBC # FLD AUTO: 11.01 K/UL

## 2018-12-18 ENCOUNTER — OUTPATIENT (OUTPATIENT)
Dept: OUTPATIENT SERVICES | Facility: HOSPITAL | Age: 83
LOS: 1 days | End: 2018-12-18
Payer: MEDICARE

## 2018-12-18 ENCOUNTER — APPOINTMENT (OUTPATIENT)
Dept: CT IMAGING | Facility: HOSPITAL | Age: 83
End: 2018-12-18

## 2018-12-18 DIAGNOSIS — Z96.642 PRESENCE OF LEFT ARTIFICIAL HIP JOINT: Chronic | ICD-10-CM

## 2018-12-18 DIAGNOSIS — L02.416 CUTANEOUS ABSCESS OF LEFT LOWER LIMB: ICD-10-CM

## 2018-12-18 PROCEDURE — 75984 XRAY CONTROL CATHETER CHANGE: CPT | Mod: 26

## 2018-12-18 PROCEDURE — 49423 EXCHANGE DRAINAGE CATHETER: CPT

## 2018-12-18 PROCEDURE — C1769: CPT

## 2018-12-18 PROCEDURE — C1729: CPT

## 2018-12-18 PROCEDURE — 72192 CT PELVIS W/O DYE: CPT | Mod: 26

## 2018-12-18 PROCEDURE — 72192 CT PELVIS W/O DYE: CPT

## 2018-12-18 PROCEDURE — 75984 XRAY CONTROL CATHETER CHANGE: CPT

## 2018-12-18 PROCEDURE — C1887: CPT

## 2018-12-21 DIAGNOSIS — Z43.8 ENCOUNTER FOR ATTENTION TO OTHER ARTIFICIAL OPENINGS: ICD-10-CM

## 2018-12-21 DIAGNOSIS — L02.416 CUTANEOUS ABSCESS OF LEFT LOWER LIMB: ICD-10-CM

## 2018-12-31 ENCOUNTER — RX RENEWAL (OUTPATIENT)
Age: 83
End: 2018-12-31

## 2019-01-01 ENCOUNTER — RX RENEWAL (OUTPATIENT)
Age: 84
End: 2019-01-01

## 2019-01-01 ENCOUNTER — APPOINTMENT (OUTPATIENT)
Dept: PULMONOLOGY | Facility: CLINIC | Age: 84
End: 2019-01-01
Payer: MEDICARE

## 2019-01-01 ENCOUNTER — LABORATORY RESULT (OUTPATIENT)
Age: 84
End: 2019-01-01

## 2019-01-01 ENCOUNTER — APPOINTMENT (OUTPATIENT)
Dept: INTERNAL MEDICINE | Facility: CLINIC | Age: 84
End: 2019-01-01
Payer: MEDICARE

## 2019-01-01 ENCOUNTER — MEDICATION RENEWAL (OUTPATIENT)
Age: 84
End: 2019-01-01

## 2019-01-01 ENCOUNTER — NON-APPOINTMENT (OUTPATIENT)
Age: 84
End: 2019-01-01

## 2019-01-01 ENCOUNTER — APPOINTMENT (OUTPATIENT)
Dept: INFECTIOUS DISEASE | Facility: CLINIC | Age: 84
End: 2019-01-01
Payer: MEDICARE

## 2019-01-01 ENCOUNTER — RECORD ABSTRACTING (OUTPATIENT)
Age: 84
End: 2019-01-01

## 2019-01-01 VITALS
BODY MASS INDEX: 24.48 KG/M2 | SYSTOLIC BLOOD PRESSURE: 110 MMHG | DIASTOLIC BLOOD PRESSURE: 70 MMHG | WEIGHT: 133 LBS | HEIGHT: 62 IN

## 2019-01-01 VITALS
TEMPERATURE: 97.8 F | HEIGHT: 62 IN | HEART RATE: 66 BPM | SYSTOLIC BLOOD PRESSURE: 109 MMHG | RESPIRATION RATE: 16 BRPM | BODY MASS INDEX: 24.48 KG/M2 | WEIGHT: 133 LBS | OXYGEN SATURATION: 96 % | DIASTOLIC BLOOD PRESSURE: 63 MMHG

## 2019-01-01 VITALS — DIASTOLIC BLOOD PRESSURE: 70 MMHG | SYSTOLIC BLOOD PRESSURE: 120 MMHG

## 2019-01-01 VITALS
RESPIRATION RATE: 15 BRPM | SYSTOLIC BLOOD PRESSURE: 152 MMHG | HEART RATE: 86 BPM | HEIGHT: 62 IN | OXYGEN SATURATION: 95 % | DIASTOLIC BLOOD PRESSURE: 70 MMHG | BODY MASS INDEX: 24.48 KG/M2 | WEIGHT: 133 LBS

## 2019-01-01 VITALS
WEIGHT: 133 LBS | DIASTOLIC BLOOD PRESSURE: 70 MMHG | RESPIRATION RATE: 15 BRPM | OXYGEN SATURATION: 95 % | BODY MASS INDEX: 24.48 KG/M2 | SYSTOLIC BLOOD PRESSURE: 152 MMHG | HEIGHT: 62 IN | HEART RATE: 86 BPM

## 2019-01-01 VITALS
HEART RATE: 65 BPM | DIASTOLIC BLOOD PRESSURE: 67 MMHG | TEMPERATURE: 98.5 F | OXYGEN SATURATION: 95 % | SYSTOLIC BLOOD PRESSURE: 100 MMHG

## 2019-01-01 DIAGNOSIS — I10 ESSENTIAL (PRIMARY) HYPERTENSION: ICD-10-CM

## 2019-01-01 DIAGNOSIS — E78.00 PURE HYPERCHOLESTEROLEMIA, UNSPECIFIED: ICD-10-CM

## 2019-01-01 DIAGNOSIS — Z79.2 LONG TERM (CURRENT) USE OF ANTIBIOTICS: ICD-10-CM

## 2019-01-01 DIAGNOSIS — R91.8 OTHER NONSPECIFIC ABNORMAL FINDING OF LUNG FIELD: ICD-10-CM

## 2019-01-01 DIAGNOSIS — A49.1 STREPTOCOCCAL INFECTION, UNSPECIFIED SITE: ICD-10-CM

## 2019-01-01 DIAGNOSIS — R04.2 HEMOPTYSIS: ICD-10-CM

## 2019-01-01 LAB
25(OH)D3 SERPL-MCNC: 57.1 NG/ML
ALBUMIN SERPL ELPH-MCNC: 3.8 G/DL
ALBUMIN SERPL ELPH-MCNC: 4 G/DL
ALP BLD-CCNC: 77 U/L
ALP BLD-CCNC: 79 U/L
ALT SERPL-CCNC: 14 U/L
ALT SERPL-CCNC: 20 U/L
ANION GAP SERPL CALC-SCNC: 14 MMOL/L
ANION GAP SERPL CALC-SCNC: 15 MMOL/L
APTT BLD: 28.4 SEC
AST SERPL-CCNC: 20 U/L
AST SERPL-CCNC: 24 U/L
BASOPHILS # BLD AUTO: 0.05 K/UL
BASOPHILS NFR BLD AUTO: 0.4 %
BILIRUB SERPL-MCNC: 0.2 MG/DL
BILIRUB SERPL-MCNC: 0.2 MG/DL
BUN SERPL-MCNC: 43 MG/DL
BUN SERPL-MCNC: 59 MG/DL
CALCIUM SERPL-MCNC: 10.5 MG/DL
CALCIUM SERPL-MCNC: 10.6 MG/DL
CHLORIDE SERPL-SCNC: 100 MMOL/L
CHLORIDE SERPL-SCNC: 103 MMOL/L
CHOLEST SERPL-MCNC: 146 MG/DL
CHOLEST/HDLC SERPL: 3.3 RATIO
CO2 SERPL-SCNC: 23 MMOL/L
CO2 SERPL-SCNC: 24 MMOL/L
CREAT SERPL-MCNC: 1.13 MG/DL
CREAT SERPL-MCNC: 1.36 MG/DL
EOSINOPHIL # BLD AUTO: 0.3 K/UL
EOSINOPHIL NFR BLD AUTO: 2.5 %
ESTIMATED AVERAGE GLUCOSE: 114 MG/DL
GLUCOSE SERPL-MCNC: 116 MG/DL
GLUCOSE SERPL-MCNC: 89 MG/DL
HBA1C MFR BLD HPLC: 5.6 %
HCT VFR BLD CALC: 37.5 %
HDLC SERPL-MCNC: 44 MG/DL
HGB BLD-MCNC: 11.2 G/DL
IMM GRANULOCYTES NFR BLD AUTO: 0.3 %
INR PPP: 0.99 RATIO
LDLC SERPL CALC-MCNC: 42 MG/DL
LYMPHOCYTES # BLD AUTO: 1.37 K/UL
LYMPHOCYTES NFR BLD AUTO: 11.6 %
MAN DIFF?: NORMAL
MCHC RBC-ENTMCNC: 27.7 PG
MCHC RBC-ENTMCNC: 29.9 GM/DL
MCV RBC AUTO: 92.6 FL
MONOCYTES # BLD AUTO: 0.75 K/UL
MONOCYTES NFR BLD AUTO: 6.4 %
NEUTROPHILS # BLD AUTO: 9.3 K/UL
NEUTROPHILS NFR BLD AUTO: 78.8 %
PLATELET # BLD AUTO: 363 K/UL
POTASSIUM SERPL-SCNC: 4.8 MMOL/L
POTASSIUM SERPL-SCNC: 5 MMOL/L
PROT SERPL-MCNC: 7 G/DL
PROT SERPL-MCNC: 7.1 G/DL
PT BLD: 11.3 SEC
RBC # BLD: 4.05 M/UL
RBC # FLD: 15.1 %
SAVE SPECIMEN: NORMAL
SODIUM SERPL-SCNC: 138 MMOL/L
SODIUM SERPL-SCNC: 141 MMOL/L
T3RU NFR SERPL: 0.9 TBI
T4 SERPL-MCNC: 4.3 UG/DL
TRIGL SERPL-MCNC: 300 MG/DL
TSH SERPL-ACNC: 3.89 UIU/ML
URATE SERPL-MCNC: 10.1 MG/DL
WBC # FLD AUTO: 11.81 K/UL

## 2019-01-01 PROCEDURE — 99214 OFFICE O/P EST MOD 30 MIN: CPT | Mod: 25

## 2019-01-01 PROCEDURE — 99213 OFFICE O/P EST LOW 20 MIN: CPT

## 2019-01-01 PROCEDURE — 36415 COLL VENOUS BLD VENIPUNCTURE: CPT

## 2019-01-01 PROCEDURE — 93000 ELECTROCARDIOGRAM COMPLETE: CPT

## 2019-01-01 PROCEDURE — 71046 X-RAY EXAM CHEST 2 VIEWS: CPT

## 2019-01-01 PROCEDURE — 99213 OFFICE O/P EST LOW 20 MIN: CPT | Mod: 25

## 2019-01-01 RX ORDER — ATORVASTATIN CALCIUM 40 MG/1
40 TABLET, FILM COATED ORAL
Qty: 90 | Refills: 3 | Status: ACTIVE | COMMUNITY
Start: 2018-06-08 | End: 1900-01-01

## 2019-01-01 RX ORDER — AMLODIPINE BESYLATE 2.5 MG/1
2.5 TABLET ORAL
Qty: 90 | Refills: 3 | Status: ACTIVE | COMMUNITY
Start: 2018-06-08 | End: 1900-01-01

## 2019-01-01 RX ORDER — DONEPEZIL HYDROCHLORIDE 10 MG/1
10 TABLET ORAL DAILY
Qty: 90 | Refills: 3 | Status: ACTIVE | COMMUNITY
Start: 2018-06-08 | End: 1900-01-01

## 2019-01-01 RX ORDER — POTASSIUM CITRATE 10 MEQ/1
10 MEQ TABLET, EXTENDED RELEASE ORAL
Qty: 90 | Refills: 3 | Status: ACTIVE | COMMUNITY
Start: 2018-09-10 | End: 1900-01-01

## 2019-01-01 RX ORDER — CARVEDILOL 6.25 MG/1
6.25 TABLET, FILM COATED ORAL
Qty: 180 | Refills: 3 | Status: ACTIVE | COMMUNITY
Start: 2017-09-12 | End: 1900-01-01

## 2019-01-01 RX ORDER — CEPHALEXIN 500 MG/1
500 CAPSULE ORAL
Qty: 90 | Refills: 3 | Status: ACTIVE | COMMUNITY
Start: 2019-01-14 | End: 1900-01-01

## 2019-01-01 RX ORDER — PANTOPRAZOLE 40 MG/1
40 TABLET, DELAYED RELEASE ORAL DAILY
Qty: 90 | Refills: 3 | Status: ACTIVE | COMMUNITY
Start: 2018-05-23 | End: 1900-01-01

## 2019-01-01 RX ORDER — GABAPENTIN 100 MG/1
100 CAPSULE ORAL 3 TIMES DAILY
Qty: 90 | Refills: 3 | Status: DISCONTINUED | COMMUNITY
End: 2019-01-01

## 2019-01-01 RX ORDER — GABAPENTIN 300 MG/1
300 CAPSULE ORAL
Qty: 90 | Refills: 0 | Status: ACTIVE | COMMUNITY
Start: 2019-01-01

## 2019-01-03 ENCOUNTER — FORM ENCOUNTER (OUTPATIENT)
Age: 84
End: 2019-01-03

## 2019-01-03 ENCOUNTER — MEDICATION RENEWAL (OUTPATIENT)
Age: 84
End: 2019-01-03

## 2019-01-04 ENCOUNTER — OUTPATIENT (OUTPATIENT)
Dept: OUTPATIENT SERVICES | Facility: HOSPITAL | Age: 84
LOS: 1 days | End: 2019-01-04
Payer: MEDICARE

## 2019-01-04 DIAGNOSIS — L02.416 CUTANEOUS ABSCESS OF LEFT LOWER LIMB: ICD-10-CM

## 2019-01-04 DIAGNOSIS — Z96.642 PRESENCE OF LEFT ARTIFICIAL HIP JOINT: Chronic | ICD-10-CM

## 2019-01-04 PROCEDURE — 76080 X-RAY EXAM OF FISTULA: CPT

## 2019-01-04 PROCEDURE — 49424 ASSESS CYST CONTRAST INJECT: CPT

## 2019-01-04 PROCEDURE — 76080 X-RAY EXAM OF FISTULA: CPT | Mod: 26

## 2019-01-08 ENCOUNTER — APPOINTMENT (OUTPATIENT)
Dept: INFECTIOUS DISEASE | Facility: CLINIC | Age: 84
End: 2019-01-08
Payer: MEDICARE

## 2019-01-08 VITALS
TEMPERATURE: 97.2 F | DIASTOLIC BLOOD PRESSURE: 44 MMHG | SYSTOLIC BLOOD PRESSURE: 74 MMHG | HEART RATE: 70 BPM | HEIGHT: 62 IN | OXYGEN SATURATION: 99 %

## 2019-01-08 DIAGNOSIS — Z43.8 ENCOUNTER FOR ATTENTION TO OTHER ARTIFICIAL OPENINGS: ICD-10-CM

## 2019-01-08 DIAGNOSIS — T81.41XD INFECTION FOLLOWING A PROCEDURE, SUPERFICIAL INCISIONAL SURGICAL SITE, SUBSEQUENT ENCOUNTER: ICD-10-CM

## 2019-01-08 PROCEDURE — 99213 OFFICE O/P EST LOW 20 MIN: CPT

## 2019-01-09 LAB
ALBUMIN SERPL ELPH-MCNC: 3.5 G/DL
ALP BLD-CCNC: 86 U/L
ALT SERPL-CCNC: 16 U/L
ANION GAP SERPL CALC-SCNC: 14 MMOL/L
AST SERPL-CCNC: 22 U/L
BASOPHILS # BLD AUTO: 0.02 K/UL
BASOPHILS NFR BLD AUTO: 0.2 %
BILIRUB SERPL-MCNC: 0.3 MG/DL
BUN SERPL-MCNC: 39 MG/DL
CALCIUM SERPL-MCNC: 10.9 MG/DL
CHLORIDE SERPL-SCNC: 101 MMOL/L
CO2 SERPL-SCNC: 26 MMOL/L
CREAT SERPL-MCNC: 1.39 MG/DL
EOSINOPHIL # BLD AUTO: 0.31 K/UL
EOSINOPHIL NFR BLD AUTO: 3 %
ERYTHROCYTE [SEDIMENTATION RATE] IN BLOOD BY WESTERGREN METHOD: 73 MM/HR
GLUCOSE SERPL-MCNC: 159 MG/DL
HCT VFR BLD CALC: 38.1 %
HGB BLD-MCNC: 11.5 G/DL
IMM GRANULOCYTES NFR BLD AUTO: 0.2 %
LYMPHOCYTES # BLD AUTO: 1.08 K/UL
LYMPHOCYTES NFR BLD AUTO: 10.5 %
MAN DIFF?: NORMAL
MCHC RBC-ENTMCNC: 26.6 PG
MCHC RBC-ENTMCNC: 30.2 GM/DL
MCV RBC AUTO: 88.2 FL
MONOCYTES # BLD AUTO: 0.61 K/UL
MONOCYTES NFR BLD AUTO: 6 %
NEUTROPHILS # BLD AUTO: 8.2 K/UL
NEUTROPHILS NFR BLD AUTO: 80.1 %
PLATELET # BLD AUTO: 414 K/UL
POTASSIUM SERPL-SCNC: 4.5 MMOL/L
PROT SERPL-MCNC: 7.7 G/DL
RBC # BLD: 4.32 M/UL
RBC # FLD: 15.3 %
SODIUM SERPL-SCNC: 141 MMOL/L
WBC # FLD AUTO: 10.24 K/UL

## 2019-01-10 NOTE — PHYSICAL EXAM
[General Appearance - Alert] : alert [General Appearance - In No Acute Distress] : in no acute distress [Sclera] : the sclera and conjunctiva were normal [Extraocular Movements] : extraocular movements were intact [Outer Ear] : the ears and nose were normal in appearance [Examination Of The Oral Cavity] : the lips and gums were normal [Oropharynx] : the oropharynx was normal with no thrush [Auscultation Breath Sounds / Voice Sounds] : lungs were clear to auscultation bilaterally [Heart Sounds] : normal S1 and S2 [FreeTextEntry1] : LE edema+ [Bowel Sounds] : normal bowel sounds [Abdomen Soft] : soft [Abdomen Tenderness] : non-tender [Abdomen Mass (___ Cm)] : no abdominal mass palpated [Costovertebral Angle Tenderness] : no CVA tenderness [Skin Color & Pigmentation] : normal skin color and pigmentation [] : no rash [No Focal Deficits] : no focal deficits [Affect] : the affect was normal

## 2019-01-10 NOTE — ASSESSMENT
[FreeTextEntry1] : 89 y/o female with GBS left hip PJI infection comes for f/u visit..\par \par Doing well. She is too high risk for joint surgery. S/p IR drainage of collection + IV abx. \par \par Given above, will place on life long long term po abx suppression, keflex 500 mg po daily.\par \par Risks of abx explained to pt and daughters. GI issues, allergic reactions, cdiff, resistance, yeast infections, etc explained. \par \par Monitor for fevers, worsening hip pain, etc. \par \par D/w pt and daughters in detail. \par \par  [Treatment Education] : treatment education [Treatment Adherence] : treatment adherence [Rx Dose / Side Effects] : Rx dose/side effects

## 2019-01-14 ENCOUNTER — RX RENEWAL (OUTPATIENT)
Age: 84
End: 2019-01-14

## 2019-01-14 LAB
BACTERIA BLD CULT: NORMAL
BACTERIA BLD CULT: NORMAL

## 2019-01-14 RX ORDER — CEPHALEXIN 500 MG/1
500 CAPSULE ORAL EVERY 8 HOURS
Qty: 21 | Refills: 0 | Status: DISCONTINUED | COMMUNITY
Start: 2018-12-19 | End: 2019-01-14

## 2019-02-07 ENCOUNTER — RX RENEWAL (OUTPATIENT)
Age: 84
End: 2019-02-07

## 2019-02-21 ENCOUNTER — MEDICATION RENEWAL (OUTPATIENT)
Age: 84
End: 2019-02-21

## 2019-03-01 ENCOUNTER — RX RENEWAL (OUTPATIENT)
Age: 84
End: 2019-03-01

## 2019-03-05 ENCOUNTER — RECORD ABSTRACTING (OUTPATIENT)
Age: 84
End: 2019-03-05

## 2019-03-05 DIAGNOSIS — R32 UNSPECIFIED URINARY INCONTINENCE: ICD-10-CM

## 2019-03-06 ENCOUNTER — RX RENEWAL (OUTPATIENT)
Age: 84
End: 2019-03-06

## 2019-04-25 ENCOUNTER — MEDICATION RENEWAL (OUTPATIENT)
Age: 84
End: 2019-04-25

## 2019-06-03 ENCOUNTER — RX RENEWAL (OUTPATIENT)
Age: 84
End: 2019-06-03

## 2019-06-04 ENCOUNTER — APPOINTMENT (OUTPATIENT)
Dept: PULMONOLOGY | Facility: CLINIC | Age: 84
End: 2019-06-04
Payer: MEDICARE

## 2019-06-04 VITALS — SYSTOLIC BLOOD PRESSURE: 118 MMHG | OXYGEN SATURATION: 96 % | DIASTOLIC BLOOD PRESSURE: 76 MMHG | HEART RATE: 53 BPM

## 2019-06-04 DIAGNOSIS — Z87.891 PERSONAL HISTORY OF NICOTINE DEPENDENCE: ICD-10-CM

## 2019-06-04 PROCEDURE — 99205 OFFICE O/P NEW HI 60 MIN: CPT | Mod: 25

## 2019-06-04 PROCEDURE — 71046 X-RAY EXAM CHEST 2 VIEWS: CPT

## 2019-06-04 NOTE — PHYSICAL EXAM
[General Appearance - Well Developed] : well developed [General Appearance - Well Nourished] : well nourished [Heart Sounds] : normal S1 and S2 [Auscultation Breath Sounds / Voice Sounds] : lungs were clear to auscultation bilaterally [Lungs Percussion] : the lungs were normal to percussion [Abdomen Soft] : soft [] : no hepato-splenomegaly [Abdomen Tenderness] : non-tender [Nail Clubbing] : no clubbing of the fingernails [Cyanosis, Localized] : no localized cyanosis [1+ Pitting] : 1+  pitting

## 2019-06-05 NOTE — HISTORY OF PRESENT ILLNESS
[FreeTextEntry1] : NICOLAS BAUTISTA is a 89 year old  F referred for pulmonary evaluation for hemoptysis\par \par 1 month ago had episode of hemoptysis then slightly 1-2 x for 1-2 days.\par Chronic cough felt related to allergy. \par No SOB\par No wheezing\par \par Past pulmonary history.  N\par Occupational Exposure. N\par Family history of pulmonary disease. N\par Recent travel\par Pets Dog not allergic\par \par No cough with eating or difficulty swallowing. \par \par \par

## 2019-06-05 NOTE — PROCEDURE
[FreeTextEntry1] : PA and lateral chest radiograph reveals A normal heart and mediastinum. Bony structures are intact however she is kyphotic. There is a mass like density in the right upper lobe with possible volume. There are no other acute infiltrates and no evidence of pleural disease\par \par Density RUL increased compared to prior films 11/18\par

## 2019-06-05 NOTE — ASSESSMENT
[FreeTextEntry1] : Discussed options with pt. and family.\par For observation\par If recc hemoptysis will obtain f/u CT and in the interim discuss possibility of RT without bx if possible.

## 2019-06-24 PROBLEM — E78.00 HYPERCHOLESTEROLEMIA: Status: ACTIVE | Noted: 2017-09-12

## 2019-06-24 PROBLEM — I10 HYPERTENSION: Status: ACTIVE | Noted: 2017-09-12

## 2019-09-09 NOTE — REVIEW OF SYSTEMS
[Negative] : Cardiovascular [FreeTextEntry6] : history [FreeTextEntry9] : occasional [de-identified] : occasional radiulopathy [de-identified] : unchanged

## 2019-09-09 NOTE — ASSESSMENT
[FreeTextEntry1] : Karine a remarkable 89-year-old female whose history has been reviewed above\par \par She has a history of hypertension blood pressure is under excellent control she has no orthostasis no medication changes\par \par She apparently has a questionable lesion in the lung however she has had no further hemoptysis no intervention. The daughter is quite attentive and knows that if there is change him to be informed\par \par She has a history of ASHD she has no symptoms of ischemia she has had a evaluation by cardiology we will attempt to obtain the results lungs are clear she has no significant edema\par \par She has chronic GBS of the left hip and is currently on suppressive therapy. She has no diarrhea no ill affects this will be maintained\par \par She continues to have some issues with back pain we will obtain physical therapy\par \par She has a history of hyperparathyroidism she has been seen by endocrinology a serum calcium has been obtained \par \par She does have osteoporosis we have discussed therapy\par \par Her EKG shows what seems to be a sinus arrhythmia or dropped beats. I will have it reviewed by cardiology

## 2019-09-09 NOTE — HISTORY OF PRESENT ILLNESS
[FreeTextEntry1] : This is a 89-year-old female for evaluation and treatment of her hypercholesterolemia hyperparathyroidism chronic strep infection hypertension hypercholesterolemia ASHD [de-identified] : Patient is accompanied by her aide and her daughter. She is remarkably preserved she has no specific complaints.\par \par She did have a recent episode of hemoptysis a questionable lung mass was noted but has had no further episodes\par \par In addition she has been treated by ID with chronic suppressive antibiotics for a chronic strep infection of the bone\par \par As per the daughter and the patient she has no complaints

## 2019-09-09 NOTE — PHYSICAL EXAM
[No Acute Distress] : no acute distress [Well Nourished] : well nourished [Normal Sclera/Conjunctiva] : normal sclera/conjunctiva [No JVD] : no jugular venous distention [Supple] : supple [No Lymphadenopathy] : no lymphadenopathy [No Respiratory Distress] : no respiratory distress  [Clear to Auscultation] : lungs were clear to auscultation bilaterally [No Accessory Muscle Use] : no accessory muscle use [Normal Percussion] : the chest was normal to percussion [No Edema] : there was no peripheral edema [de-identified] : no change [de-identified] : mild dementia appropriate affect

## 2019-09-11 PROBLEM — A49.1 GBS (GROUP B STREPTOCOCCUS) INFECTION: Status: ACTIVE | Noted: 2018-12-19

## 2019-09-11 NOTE — HISTORY OF PRESENT ILLNESS
[FreeTextEntry1] : 89 y/o female comes for f/u visit.\par \par She was diagnosed with GBS left hip PJI, s/p IR drain placement. On PO Keflex daily suppressive therapy currently.\par \par No major issues. \par \par No diarrhea, nausea, vomiting, abd pain.  No rash, yeast infection. \par \par No urinary issues. \par \par Daughter at bedside helped with hx.

## 2019-09-11 NOTE — ASSESSMENT
[FreeTextEntry1] : 89 y/o female with GBS left hip PJI infection comes for f/u visit..\par \par Doing well. She is too high risk for joint surgery. S/p IR drainage of collection + IV abx. \par \par Continue life long long term po abx suppression, keflex 500 mg po daily.\par \par Risks of abx explained to pt and daughters. GI issues, allergic reactions, cdiff, resistance, yeast infections, etc explained. \par \par Monitor for fevers, worsening hip pain, etc. \par \par D/w pt and daughter in detail. \par \par  [Treatment Education] : treatment education [Treatment Adherence] : treatment adherence [Rx Dose / Side Effects] : Rx dose/side effects

## 2019-10-23 NOTE — PROCEDURE
[FreeTextEntry1] : PA and lateral chest radiograph reveals A normal heart and mediastinum. Bony structures are intact however she is kyphotic. There is a mass like density in the right upper lobe with possible volume. There are no other acute infiltrates and no evidence of pleural disease\par \par Density RUL minimally  increased compared to prior films of 6/4/19\par

## 2019-10-23 NOTE — HISTORY OF PRESENT ILLNESS
[FreeTextEntry1] : Some chronic cough\par No furthur hemoptysis\par No SOB, wheezing or CP.\par \par \par

## 2019-10-23 NOTE — PHYSICAL EXAM
[General Appearance - Well Developed] : well developed [General Appearance - Well Nourished] : well nourished [Heart Sounds] : normal S1 and S2 [Auscultation Breath Sounds / Voice Sounds] : lungs were clear to auscultation bilaterally [Lungs Percussion] : the lungs were normal to percussion [Abdomen Soft] : soft [Abdomen Tenderness] : non-tender [] : no hepato-splenomegaly [Nail Clubbing] : no clubbing of the fingernails [Cyanosis, Localized] : no localized cyanosis [1+ Pitting] : 1+  pitting

## 2019-10-28 PROBLEM — R04.2 HEMOPTYSIS: Status: ACTIVE | Noted: 2019-06-04

## 2019-10-28 PROBLEM — R91.8 LUNG MASS: Status: ACTIVE | Noted: 2019-06-05

## 2019-10-29 NOTE — HISTORY OF PRESENT ILLNESS
[FreeTextEntry1] : C/o- "coughing up blood" x last night. Had multiple times and overnight sheet and night gown was saturated. only on 81 asa Bright red bllod. No dyspnea no temp today less cough

## 2019-10-29 NOTE — DISCUSSION/SUMMARY
[FreeTextEntry1] : hemoptysis \par Right upper lobe lung mass-favor lung cancer\par Noted prior notes patient and family had declined intervention and continued observation and  rediscussed today\par on  daily keflex  1 pill per  day for osteomyelitis  1 year  ago\par hold  ASA\par noted hx ASHD with stens\par add doxy for  any infectious  component\par f/u Dr Retana\par Recommend hydration fluids for prerenal azotemia\par Question and recheck if CBC completed

## 2019-10-29 NOTE — PROCEDURE
[FreeTextEntry1] : Chest x-ray PA lateral October 28, 2019\par Right upper lobe mass\par No adenopathy appreciated\par Cardiac size is normal\par No pleural effusions\par \par venipuncture for labs\par \par

## 2020-01-01 ENCOUNTER — APPOINTMENT (OUTPATIENT)
Dept: PULMONOLOGY | Facility: CLINIC | Age: 85
End: 2020-01-01

## 2020-01-01 ENCOUNTER — RX RENEWAL (OUTPATIENT)
Age: 85
End: 2020-01-01

## 2020-01-01 ENCOUNTER — INPATIENT (INPATIENT)
Facility: HOSPITAL | Age: 85
LOS: 0 days | DRG: 871 | End: 2020-06-07
Attending: HOSPITALIST | Admitting: HOSPITALIST
Payer: MEDICARE

## 2020-01-01 ENCOUNTER — TRANSCRIPTION ENCOUNTER (OUTPATIENT)
Age: 85
End: 2020-01-01

## 2020-01-01 ENCOUNTER — NON-APPOINTMENT (OUTPATIENT)
Age: 85
End: 2020-01-01

## 2020-01-01 ENCOUNTER — APPOINTMENT (OUTPATIENT)
Dept: OPHTHALMOLOGY | Facility: CLINIC | Age: 85
End: 2020-01-01
Payer: MEDICARE

## 2020-01-01 ENCOUNTER — INPATIENT (INPATIENT)
Facility: HOSPITAL | Age: 85
LOS: 11 days | Discharge: HOME | DRG: 853 | End: 2020-05-26
Attending: INTERNAL MEDICINE | Admitting: HOSPITALIST
Payer: MEDICARE

## 2020-01-01 ENCOUNTER — MEDICATION RENEWAL (OUTPATIENT)
Age: 85
End: 2020-01-01

## 2020-01-01 ENCOUNTER — APPOINTMENT (OUTPATIENT)
Dept: INTERNAL MEDICINE | Facility: CLINIC | Age: 85
End: 2020-01-01
Payer: MEDICARE

## 2020-01-01 VITALS
RESPIRATION RATE: 24 BRPM | OXYGEN SATURATION: 100 % | WEIGHT: 130.07 LBS | DIASTOLIC BLOOD PRESSURE: 50 MMHG | SYSTOLIC BLOOD PRESSURE: 80 MMHG | TEMPERATURE: 98 F | HEIGHT: 64 IN | HEART RATE: 54 BPM

## 2020-01-01 VITALS
RESPIRATION RATE: 18 BRPM | SYSTOLIC BLOOD PRESSURE: 107 MMHG | TEMPERATURE: 97 F | OXYGEN SATURATION: 92 % | HEART RATE: 69 BPM | DIASTOLIC BLOOD PRESSURE: 66 MMHG

## 2020-01-01 VITALS
RESPIRATION RATE: 18 BRPM | HEART RATE: 66 BPM | SYSTOLIC BLOOD PRESSURE: 82 MMHG | OXYGEN SATURATION: 97 % | DIASTOLIC BLOOD PRESSURE: 46 MMHG | TEMPERATURE: 95 F | WEIGHT: 119.93 LBS | HEIGHT: 65 IN

## 2020-01-01 VITALS — OXYGEN SATURATION: 96 % | HEART RATE: 73 BPM

## 2020-01-01 VITALS
WEIGHT: 126 LBS | DIASTOLIC BLOOD PRESSURE: 70 MMHG | SYSTOLIC BLOOD PRESSURE: 130 MMHG | HEIGHT: 62 IN | BODY MASS INDEX: 23.19 KG/M2

## 2020-01-01 DIAGNOSIS — A04.72 ENTEROCOLITIS DUE TO CLOSTRIDIUM DIFFICILE, NOT SPECIFIED AS RECURRENT: ICD-10-CM

## 2020-01-01 DIAGNOSIS — R53.2 FUNCTIONAL QUADRIPLEGIA: ICD-10-CM

## 2020-01-01 DIAGNOSIS — Z02.9 ENCOUNTER FOR ADMINISTRATIVE EXAMINATIONS, UNSPECIFIED: ICD-10-CM

## 2020-01-01 DIAGNOSIS — J06.9 ACUTE UPPER RESPIRATORY INFECTION, UNSPECIFIED: ICD-10-CM

## 2020-01-01 DIAGNOSIS — E78.5 HYPERLIPIDEMIA, UNSPECIFIED: ICD-10-CM

## 2020-01-01 DIAGNOSIS — E43 UNSPECIFIED SEVERE PROTEIN-CALORIE MALNUTRITION: ICD-10-CM

## 2020-01-01 DIAGNOSIS — Z96.642 PRESENCE OF LEFT ARTIFICIAL HIP JOINT: Chronic | ICD-10-CM

## 2020-01-01 DIAGNOSIS — I25.10 ATHEROSCLEROTIC HEART DISEASE OF NATIVE CORONARY ARTERY WITHOUT ANGINA PECTORIS: ICD-10-CM

## 2020-01-01 DIAGNOSIS — R62.7 ADULT FAILURE TO THRIVE: ICD-10-CM

## 2020-01-01 DIAGNOSIS — K11.7 DISTURBANCES OF SALIVARY SECRETION: ICD-10-CM

## 2020-01-01 DIAGNOSIS — Z51.5 ENCOUNTER FOR PALLIATIVE CARE: ICD-10-CM

## 2020-01-01 DIAGNOSIS — I10 ESSENTIAL (PRIMARY) HYPERTENSION: ICD-10-CM

## 2020-01-01 DIAGNOSIS — Z29.9 ENCOUNTER FOR PROPHYLACTIC MEASURES, UNSPECIFIED: ICD-10-CM

## 2020-01-01 DIAGNOSIS — A41.9 SEPSIS, UNSPECIFIED ORGANISM: ICD-10-CM

## 2020-01-01 DIAGNOSIS — R91.8 OTHER NONSPECIFIC ABNORMAL FINDING OF LUNG FIELD: ICD-10-CM

## 2020-01-01 DIAGNOSIS — N17.9 ACUTE KIDNEY FAILURE, UNSPECIFIED: ICD-10-CM

## 2020-01-01 DIAGNOSIS — N30.00 ACUTE CYSTITIS WITHOUT HEMATURIA: ICD-10-CM

## 2020-01-01 DIAGNOSIS — R06.00 DYSPNEA, UNSPECIFIED: ICD-10-CM

## 2020-01-01 LAB
-  AMIKACIN: SIGNIFICANT CHANGE UP
-  AMPICILLIN/SULBACTAM: SIGNIFICANT CHANGE UP
-  AMPICILLIN: SIGNIFICANT CHANGE UP
-  AZTREONAM: SIGNIFICANT CHANGE UP
-  CEFAZOLIN: SIGNIFICANT CHANGE UP
-  CEFEPIME: SIGNIFICANT CHANGE UP
-  CEFOXITIN: SIGNIFICANT CHANGE UP
-  CEFTRIAXONE: SIGNIFICANT CHANGE UP
-  CIPROFLOXACIN: SIGNIFICANT CHANGE UP
-  COAGULASE NEGATIVE STAPHYLOCOCCUS: SIGNIFICANT CHANGE UP
-  ERTAPENEM: SIGNIFICANT CHANGE UP
-  GENTAMICIN: SIGNIFICANT CHANGE UP
-  IMIPENEM: SIGNIFICANT CHANGE UP
-  LEVOFLOXACIN: SIGNIFICANT CHANGE UP
-  MEROPENEM: SIGNIFICANT CHANGE UP
-  NITROFURANTOIN: SIGNIFICANT CHANGE UP
-  PIPERACILLIN/TAZOBACTAM: SIGNIFICANT CHANGE UP
-  TIGECYCLINE: SIGNIFICANT CHANGE UP
-  TOBRAMYCIN: SIGNIFICANT CHANGE UP
-  TRIMETHOPRIM/SULFAMETHOXAZOLE: SIGNIFICANT CHANGE UP
ACANTHOCYTES BLD QL SMEAR: SLIGHT — SIGNIFICANT CHANGE UP
ALBUMIN SERPL ELPH-MCNC: 1.4 G/DL — LOW (ref 3.3–5)
ALBUMIN SERPL ELPH-MCNC: 1.7 G/DL — LOW (ref 3.3–5)
ALBUMIN SERPL ELPH-MCNC: 1.8 G/DL — LOW (ref 3.3–5)
ALBUMIN SERPL ELPH-MCNC: 1.8 G/DL — LOW (ref 3.3–5)
ALBUMIN SERPL ELPH-MCNC: 1.9 G/DL — LOW (ref 3.3–5)
ALBUMIN SERPL ELPH-MCNC: 2.1 G/DL — LOW (ref 3.3–5)
ALP SERPL-CCNC: 112 U/L — SIGNIFICANT CHANGE UP (ref 40–120)
ALP SERPL-CCNC: 115 U/L — SIGNIFICANT CHANGE UP (ref 40–120)
ALP SERPL-CCNC: 117 U/L — SIGNIFICANT CHANGE UP (ref 40–120)
ALP SERPL-CCNC: 132 U/L — HIGH (ref 40–120)
ALP SERPL-CCNC: 138 U/L — HIGH (ref 40–120)
ALP SERPL-CCNC: 144 U/L — HIGH (ref 40–120)
ALP SERPL-CCNC: 161 U/L — HIGH (ref 40–120)
ALP SERPL-CCNC: 184 U/L — HIGH (ref 40–120)
ALT FLD-CCNC: 15 U/L — SIGNIFICANT CHANGE UP (ref 10–45)
ALT FLD-CCNC: 16 U/L — SIGNIFICANT CHANGE UP (ref 10–45)
ALT FLD-CCNC: 18 U/L — SIGNIFICANT CHANGE UP (ref 10–45)
ALT FLD-CCNC: 19 U/L — SIGNIFICANT CHANGE UP (ref 10–45)
ALT FLD-CCNC: 24 U/L — SIGNIFICANT CHANGE UP (ref 10–45)
ALT FLD-CCNC: 25 U/L — SIGNIFICANT CHANGE UP (ref 10–45)
ANION GAP SERPL CALC-SCNC: 10 MMOL/L — SIGNIFICANT CHANGE UP (ref 5–17)
ANION GAP SERPL CALC-SCNC: 10 MMOL/L — SIGNIFICANT CHANGE UP (ref 5–17)
ANION GAP SERPL CALC-SCNC: 11 MMOL/L — SIGNIFICANT CHANGE UP (ref 5–17)
ANION GAP SERPL CALC-SCNC: 12 MMOL/L — SIGNIFICANT CHANGE UP (ref 5–17)
ANION GAP SERPL CALC-SCNC: 8 MMOL/L — SIGNIFICANT CHANGE UP (ref 5–17)
ANION GAP SERPL CALC-SCNC: 8 MMOL/L — SIGNIFICANT CHANGE UP (ref 5–17)
ANION GAP SERPL CALC-SCNC: 9 MMOL/L — SIGNIFICANT CHANGE UP (ref 5–17)
ANION GAP SERPL CALC-SCNC: 9 MMOL/L — SIGNIFICANT CHANGE UP (ref 5–17)
ANISOCYTOSIS BLD QL: SIGNIFICANT CHANGE UP
APPEARANCE UR: ABNORMAL
APPEARANCE UR: ABNORMAL
APTT BLD: 27.2 SEC — LOW (ref 27.5–36.3)
APTT BLD: 33 SEC — SIGNIFICANT CHANGE UP (ref 27.5–36.3)
AST SERPL-CCNC: 18 U/L — SIGNIFICANT CHANGE UP (ref 10–40)
AST SERPL-CCNC: 21 U/L — SIGNIFICANT CHANGE UP (ref 10–40)
AST SERPL-CCNC: 23 U/L — SIGNIFICANT CHANGE UP (ref 10–40)
AST SERPL-CCNC: 28 U/L — SIGNIFICANT CHANGE UP (ref 10–40)
AST SERPL-CCNC: 28 U/L — SIGNIFICANT CHANGE UP (ref 10–40)
AST SERPL-CCNC: 30 U/L — SIGNIFICANT CHANGE UP (ref 10–40)
AST SERPL-CCNC: 40 U/L — SIGNIFICANT CHANGE UP (ref 10–40)
AST SERPL-CCNC: 46 U/L — HIGH (ref 10–40)
BACTERIA # UR AUTO: ABNORMAL
BACTERIA # UR AUTO: ABNORMAL
BASE EXCESS BLDV CALC-SCNC: 3.1 MMOL/L — HIGH (ref -2–2)
BASOPHILS # BLD AUTO: 0.04 K/UL — SIGNIFICANT CHANGE UP (ref 0–0.2)
BASOPHILS # BLD AUTO: 0.13 K/UL — SIGNIFICANT CHANGE UP (ref 0–0.2)
BASOPHILS NFR BLD AUTO: 0.3 % — SIGNIFICANT CHANGE UP (ref 0–2)
BASOPHILS NFR BLD AUTO: 0.4 % — SIGNIFICANT CHANGE UP (ref 0–2)
BASOPHILS NFR BLD AUTO: 0.4 % — SIGNIFICANT CHANGE UP (ref 0–2)
BASOPHILS NFR BLD AUTO: 1 % — SIGNIFICANT CHANGE UP (ref 0–2)
BILIRUB SERPL-MCNC: 0.1 MG/DL — LOW (ref 0.2–1.2)
BILIRUB SERPL-MCNC: 0.2 MG/DL — SIGNIFICANT CHANGE UP (ref 0.2–1.2)
BILIRUB SERPL-MCNC: 0.3 MG/DL — SIGNIFICANT CHANGE UP (ref 0.2–1.2)
BILIRUB SERPL-MCNC: 0.3 MG/DL — SIGNIFICANT CHANGE UP (ref 0.2–1.2)
BILIRUB UR-MCNC: ABNORMAL
BILIRUB UR-MCNC: NEGATIVE — SIGNIFICANT CHANGE UP
BUN SERPL-MCNC: 12 MG/DL — SIGNIFICANT CHANGE UP (ref 7–23)
BUN SERPL-MCNC: 13 MG/DL — SIGNIFICANT CHANGE UP (ref 7–23)
BUN SERPL-MCNC: 13 MG/DL — SIGNIFICANT CHANGE UP (ref 7–23)
BUN SERPL-MCNC: 14 MG/DL — SIGNIFICANT CHANGE UP (ref 7–23)
BUN SERPL-MCNC: 16 MG/DL — SIGNIFICANT CHANGE UP (ref 7–23)
BUN SERPL-MCNC: 20 MG/DL — SIGNIFICANT CHANGE UP (ref 7–23)
BUN SERPL-MCNC: 20 MG/DL — SIGNIFICANT CHANGE UP (ref 7–23)
BUN SERPL-MCNC: 24 MG/DL — HIGH (ref 7–23)
BUN SERPL-MCNC: 34 MG/DL — HIGH (ref 7–23)
BUN SERPL-MCNC: 47 MG/DL — HIGH (ref 7–23)
BURR CELLS BLD QL SMEAR: PRESENT — SIGNIFICANT CHANGE UP
C DIFF GDH STL QL: POSITIVE — SIGNIFICANT CHANGE UP
C DIFF GDH STL QL: SIGNIFICANT CHANGE UP
CA-I SERPL-SCNC: 1.33 MMOL/L — HIGH (ref 1.12–1.3)
CALCIUM SERPL-MCNC: 8.4 MG/DL — SIGNIFICANT CHANGE UP (ref 8.4–10.5)
CALCIUM SERPL-MCNC: 8.4 MG/DL — SIGNIFICANT CHANGE UP (ref 8.4–10.5)
CALCIUM SERPL-MCNC: 9 MG/DL — SIGNIFICANT CHANGE UP (ref 8.4–10.5)
CALCIUM SERPL-MCNC: 9.4 MG/DL — SIGNIFICANT CHANGE UP (ref 8.4–10.5)
CALCIUM SERPL-MCNC: 9.5 MG/DL — SIGNIFICANT CHANGE UP (ref 8.4–10.5)
CALCIUM SERPL-MCNC: 9.6 MG/DL — SIGNIFICANT CHANGE UP (ref 8.4–10.5)
CALCIUM SERPL-MCNC: 9.7 MG/DL — SIGNIFICANT CHANGE UP (ref 8.4–10.5)
CALCIUM SERPL-MCNC: 9.9 MG/DL — SIGNIFICANT CHANGE UP (ref 8.4–10.5)
CHLORIDE BLDV-SCNC: 109 MMOL/L — HIGH (ref 96–108)
CHLORIDE SERPL-SCNC: 101 MMOL/L — SIGNIFICANT CHANGE UP (ref 96–108)
CHLORIDE SERPL-SCNC: 103 MMOL/L — SIGNIFICANT CHANGE UP (ref 96–108)
CHLORIDE SERPL-SCNC: 105 MMOL/L — SIGNIFICANT CHANGE UP (ref 96–108)
CHLORIDE SERPL-SCNC: 105 MMOL/L — SIGNIFICANT CHANGE UP (ref 96–108)
CHLORIDE SERPL-SCNC: 107 MMOL/L — SIGNIFICANT CHANGE UP (ref 96–108)
CK MB CFR SERPL CALC: 2.4 NG/ML — SIGNIFICANT CHANGE UP (ref 0–3.8)
CO2 BLDV-SCNC: 30 MMOL/L — SIGNIFICANT CHANGE UP (ref 22–30)
CO2 SERPL-SCNC: 20 MMOL/L — LOW (ref 22–31)
CO2 SERPL-SCNC: 22 MMOL/L — SIGNIFICANT CHANGE UP (ref 22–31)
CO2 SERPL-SCNC: 25 MMOL/L — SIGNIFICANT CHANGE UP (ref 22–31)
CO2 SERPL-SCNC: 26 MMOL/L — SIGNIFICANT CHANGE UP (ref 22–31)
COLOR SPEC: YELLOW — SIGNIFICANT CHANGE UP
COLOR SPEC: YELLOW — SIGNIFICANT CHANGE UP
CREAT SERPL-MCNC: 0.71 MG/DL — SIGNIFICANT CHANGE UP (ref 0.5–1.3)
CREAT SERPL-MCNC: 0.77 MG/DL — SIGNIFICANT CHANGE UP (ref 0.5–1.3)
CREAT SERPL-MCNC: 0.78 MG/DL — SIGNIFICANT CHANGE UP (ref 0.5–1.3)
CREAT SERPL-MCNC: 0.79 MG/DL — SIGNIFICANT CHANGE UP (ref 0.5–1.3)
CREAT SERPL-MCNC: 0.86 MG/DL — SIGNIFICANT CHANGE UP (ref 0.5–1.3)
CREAT SERPL-MCNC: 1 MG/DL — SIGNIFICANT CHANGE UP (ref 0.5–1.3)
CREAT SERPL-MCNC: 1.03 MG/DL — SIGNIFICANT CHANGE UP (ref 0.5–1.3)
CREAT SERPL-MCNC: 1.35 MG/DL — HIGH (ref 0.5–1.3)
CREAT SERPL-MCNC: 1.68 MG/DL — HIGH (ref 0.5–1.3)
CREAT SERPL-MCNC: 1.72 MG/DL — HIGH (ref 0.5–1.3)
CRP SERPL-MCNC: 6.49 MG/DL — HIGH (ref 0–0.4)
CULTURE RESULTS: NO GROWTH — SIGNIFICANT CHANGE UP
CULTURE RESULTS: SIGNIFICANT CHANGE UP
DIFF PNL FLD: ABNORMAL
DIFF PNL FLD: NEGATIVE — SIGNIFICANT CHANGE UP
ELLIPTOCYTES BLD QL SMEAR: SLIGHT — SIGNIFICANT CHANGE UP
EOSINOPHIL # BLD AUTO: 0 K/UL — SIGNIFICANT CHANGE UP (ref 0–0.5)
EOSINOPHIL # BLD AUTO: 0.21 K/UL — SIGNIFICANT CHANGE UP (ref 0–0.5)
EOSINOPHIL # BLD AUTO: 0.32 K/UL — SIGNIFICANT CHANGE UP (ref 0–0.5)
EOSINOPHIL # BLD AUTO: 0.38 K/UL — SIGNIFICANT CHANGE UP (ref 0–0.5)
EOSINOPHIL NFR BLD AUTO: 0 % — SIGNIFICANT CHANGE UP (ref 0–6)
EOSINOPHIL NFR BLD AUTO: 1.5 % — SIGNIFICANT CHANGE UP (ref 0–6)
EOSINOPHIL NFR BLD AUTO: 3.3 % — SIGNIFICANT CHANGE UP (ref 0–6)
EOSINOPHIL NFR BLD AUTO: 3.7 % — SIGNIFICANT CHANGE UP (ref 0–6)
EPI CELLS # UR: 0 — SIGNIFICANT CHANGE UP
EPI CELLS # UR: 9 /HPF — HIGH
FERRITIN SERPL-MCNC: 437 NG/ML — HIGH (ref 15–150)
FLUAV SPEC QL CULT: NORMAL
FLUBV AG SPEC QL IA: NORMAL
GAS PNL BLDV: 137 MMOL/L — SIGNIFICANT CHANGE UP (ref 135–145)
GAS PNL BLDV: SIGNIFICANT CHANGE UP
GGT SERPL-CCNC: 57 U/L — HIGH (ref 8–40)
GLUCOSE BLDC GLUCOMTR-MCNC: 70 MG/DL — SIGNIFICANT CHANGE UP (ref 70–99)
GLUCOSE BLDC GLUCOMTR-MCNC: 79 MG/DL — SIGNIFICANT CHANGE UP (ref 70–99)
GLUCOSE BLDC GLUCOMTR-MCNC: 99 MG/DL — SIGNIFICANT CHANGE UP (ref 70–99)
GLUCOSE BLDV-MCNC: 85 MG/DL — SIGNIFICANT CHANGE UP (ref 70–99)
GLUCOSE SERPL-MCNC: 100 MG/DL — HIGH (ref 70–99)
GLUCOSE SERPL-MCNC: 105 MG/DL — HIGH (ref 70–99)
GLUCOSE SERPL-MCNC: 110 MG/DL — HIGH (ref 70–99)
GLUCOSE SERPL-MCNC: 132 MG/DL — HIGH (ref 70–99)
GLUCOSE SERPL-MCNC: 56 MG/DL — LOW (ref 70–99)
GLUCOSE SERPL-MCNC: 77 MG/DL — SIGNIFICANT CHANGE UP (ref 70–99)
GLUCOSE SERPL-MCNC: 77 MG/DL — SIGNIFICANT CHANGE UP (ref 70–99)
GLUCOSE SERPL-MCNC: 82 MG/DL — SIGNIFICANT CHANGE UP (ref 70–99)
GLUCOSE SERPL-MCNC: 82 MG/DL — SIGNIFICANT CHANGE UP (ref 70–99)
GLUCOSE SERPL-MCNC: 89 MG/DL — SIGNIFICANT CHANGE UP (ref 70–99)
GLUCOSE UR QL: NEGATIVE — SIGNIFICANT CHANGE UP
GLUCOSE UR QL: NEGATIVE — SIGNIFICANT CHANGE UP
GRAM STN FLD: SIGNIFICANT CHANGE UP
HCO3 BLDV-SCNC: 28 MMOL/L — SIGNIFICANT CHANGE UP (ref 21–29)
HCT VFR BLD CALC: 27.7 % — LOW (ref 34.5–45)
HCT VFR BLD CALC: 28.2 % — LOW (ref 34.5–45)
HCT VFR BLD CALC: 30.6 % — LOW (ref 34.5–45)
HCT VFR BLD CALC: 30.7 % — LOW (ref 34.5–45)
HCT VFR BLD CALC: 31.5 % — LOW (ref 34.5–45)
HCT VFR BLD CALC: 32.2 % — LOW (ref 34.5–45)
HCT VFR BLD CALC: 34.4 % — LOW (ref 34.5–45)
HCT VFR BLD CALC: 34.4 % — LOW (ref 34.5–45)
HCT VFR BLDA CALC: 29 % — LOW (ref 39–50)
HGB BLD CALC-MCNC: 9.4 G/DL — LOW (ref 11.5–15.5)
HGB BLD-MCNC: 10 G/DL — LOW (ref 11.5–15.5)
HGB BLD-MCNC: 10 G/DL — LOW (ref 11.5–15.5)
HGB BLD-MCNC: 8.2 G/DL — LOW (ref 11.5–15.5)
HGB BLD-MCNC: 8.3 G/DL — LOW (ref 11.5–15.5)
HGB BLD-MCNC: 8.9 G/DL — LOW (ref 11.5–15.5)
HGB BLD-MCNC: 9 G/DL — LOW (ref 11.5–15.5)
HGB BLD-MCNC: 9.3 G/DL — LOW (ref 11.5–15.5)
HGB BLD-MCNC: 9.8 G/DL — LOW (ref 11.5–15.5)
HYALINE CASTS # UR AUTO: 0 /LPF — SIGNIFICANT CHANGE UP (ref 0–7)
HYALINE CASTS # UR AUTO: 201 /LPF — HIGH (ref 0–2)
HYPOCHROMIA BLD QL: SLIGHT — SIGNIFICANT CHANGE UP
IMM GRANULOCYTES NFR BLD AUTO: 0.4 % — SIGNIFICANT CHANGE UP (ref 0–1.5)
IMM GRANULOCYTES NFR BLD AUTO: 0.5 % — SIGNIFICANT CHANGE UP (ref 0–1.5)
IMM GRANULOCYTES NFR BLD AUTO: 0.7 % — SIGNIFICANT CHANGE UP (ref 0–1.5)
INR BLD: 1.08 RATIO — SIGNIFICANT CHANGE UP (ref 0.88–1.16)
INR BLD: 1.16 RATIO — SIGNIFICANT CHANGE UP (ref 0.88–1.16)
KETONES UR-MCNC: NEGATIVE — SIGNIFICANT CHANGE UP
KETONES UR-MCNC: SIGNIFICANT CHANGE UP
LACTATE BLDV-MCNC: 1.1 MMOL/L — SIGNIFICANT CHANGE UP (ref 0.7–2)
LEUKOCYTE ESTERASE UR-ACNC: ABNORMAL
LEUKOCYTE ESTERASE UR-ACNC: ABNORMAL
LYMPHOCYTES # BLD AUTO: 0.13 K/UL — LOW (ref 1–3.3)
LYMPHOCYTES # BLD AUTO: 0.92 K/UL — LOW (ref 1–3.3)
LYMPHOCYTES # BLD AUTO: 0.93 K/UL — LOW (ref 1–3.3)
LYMPHOCYTES # BLD AUTO: 1 % — LOW (ref 13–44)
LYMPHOCYTES # BLD AUTO: 1.02 K/UL — SIGNIFICANT CHANGE UP (ref 1–3.3)
LYMPHOCYTES # BLD AUTO: 7.5 % — LOW (ref 13–44)
LYMPHOCYTES # BLD AUTO: 9 % — LOW (ref 13–44)
LYMPHOCYTES # BLD AUTO: 9.4 % — LOW (ref 13–44)
MAGNESIUM SERPL-MCNC: 1.7 MG/DL — SIGNIFICANT CHANGE UP (ref 1.6–2.6)
MAGNESIUM SERPL-MCNC: 1.8 MG/DL — SIGNIFICANT CHANGE UP (ref 1.6–2.6)
MAGNESIUM SERPL-MCNC: 1.8 MG/DL — SIGNIFICANT CHANGE UP (ref 1.6–2.6)
MAGNESIUM SERPL-MCNC: 1.9 MG/DL — SIGNIFICANT CHANGE UP (ref 1.6–2.6)
MAGNESIUM SERPL-MCNC: 2 MG/DL — SIGNIFICANT CHANGE UP (ref 1.6–2.6)
MANUAL SMEAR VERIFICATION: SIGNIFICANT CHANGE UP
MCHC RBC-ENTMCNC: 23.7 PG — LOW (ref 27–34)
MCHC RBC-ENTMCNC: 23.8 PG — LOW (ref 27–34)
MCHC RBC-ENTMCNC: 23.9 PG — LOW (ref 27–34)
MCHC RBC-ENTMCNC: 24 PG — LOW (ref 27–34)
MCHC RBC-ENTMCNC: 24.1 PG — LOW (ref 27–34)
MCHC RBC-ENTMCNC: 24.5 PG — LOW (ref 27–34)
MCHC RBC-ENTMCNC: 25.1 PG — LOW (ref 27–34)
MCHC RBC-ENTMCNC: 25.1 PG — LOW (ref 27–34)
MCHC RBC-ENTMCNC: 28.6 GM/DL — LOW (ref 32–36)
MCHC RBC-ENTMCNC: 29.1 GM/DL — LOW (ref 32–36)
MCHC RBC-ENTMCNC: 30 GM/DL — LOW (ref 32–36)
MCHC RBC-ENTMCNC: 30.3 GM/DL — LOW (ref 32–36)
MCHC RBC-ENTMCNC: 30.4 GM/DL — LOW (ref 32–36)
MCV RBC AUTO: 81.7 FL — SIGNIFICANT CHANGE UP (ref 80–100)
MCV RBC AUTO: 81.9 FL — SIGNIFICANT CHANGE UP (ref 80–100)
MCV RBC AUTO: 82.1 FL — SIGNIFICANT CHANGE UP (ref 80–100)
MCV RBC AUTO: 82.6 FL — SIGNIFICANT CHANGE UP (ref 80–100)
MCV RBC AUTO: 82.7 FL — SIGNIFICANT CHANGE UP (ref 80–100)
MCV RBC AUTO: 82.9 FL — SIGNIFICANT CHANGE UP (ref 80–100)
MCV RBC AUTO: 83 FL — SIGNIFICANT CHANGE UP (ref 80–100)
MCV RBC AUTO: 83.1 FL — SIGNIFICANT CHANGE UP (ref 80–100)
METHOD TYPE: SIGNIFICANT CHANGE UP
MONOCYTES # BLD AUTO: 0.13 K/UL — SIGNIFICANT CHANGE UP (ref 0–0.9)
MONOCYTES # BLD AUTO: 0.46 K/UL — SIGNIFICANT CHANGE UP (ref 0–0.9)
MONOCYTES # BLD AUTO: 0.49 K/UL — SIGNIFICANT CHANGE UP (ref 0–0.9)
MONOCYTES # BLD AUTO: 0.57 K/UL — SIGNIFICANT CHANGE UP (ref 0–0.9)
MONOCYTES NFR BLD AUTO: 1 % — LOW (ref 2–14)
MONOCYTES NFR BLD AUTO: 3.4 % — SIGNIFICANT CHANGE UP (ref 2–14)
MONOCYTES NFR BLD AUTO: 4.8 % — SIGNIFICANT CHANGE UP (ref 2–14)
MONOCYTES NFR BLD AUTO: 5.8 % — SIGNIFICANT CHANGE UP (ref 2–14)
NEUTROPHILS # BLD AUTO: 11.77 K/UL — HIGH (ref 1.8–7.4)
NEUTROPHILS # BLD AUTO: 12.5 K/UL — HIGH (ref 1.8–7.4)
NEUTROPHILS # BLD AUTO: 7.86 K/UL — HIGH (ref 1.8–7.4)
NEUTROPHILS # BLD AUTO: 8.4 K/UL — HIGH (ref 1.8–7.4)
NEUTROPHILS NFR BLD AUTO: 62 % — SIGNIFICANT CHANGE UP (ref 43–77)
NEUTROPHILS NFR BLD AUTO: 80.4 % — HIGH (ref 43–77)
NEUTROPHILS NFR BLD AUTO: 81.6 % — HIGH (ref 43–77)
NEUTROPHILS NFR BLD AUTO: 86.9 % — HIGH (ref 43–77)
NEUTS BAND # BLD: 33 % — HIGH (ref 0–8)
NITRITE UR-MCNC: NEGATIVE — SIGNIFICANT CHANGE UP
NITRITE UR-MCNC: NEGATIVE — SIGNIFICANT CHANGE UP
NRBC # BLD: 0 /100 WBCS — SIGNIFICANT CHANGE UP (ref 0–0)
NRBC # BLD: 0 /100 — SIGNIFICANT CHANGE UP (ref 0–0)
NT-PROBNP SERPL-SCNC: 2059 PG/ML — HIGH (ref 0–300)
ORGANISM # SPEC MICROSCOPIC CNT: SIGNIFICANT CHANGE UP
OTHER CELLS CSF MANUAL: 8 ML/DL — LOW (ref 18–22)
PCO2 BLDV: 51 MMHG — HIGH (ref 35–50)
PH BLDV: 7.37 — SIGNIFICANT CHANGE UP (ref 7.35–7.45)
PH UR: 5.5 — SIGNIFICANT CHANGE UP (ref 5–8)
PH UR: 7.5 — SIGNIFICANT CHANGE UP (ref 5–8)
PHOSPHATE SERPL-MCNC: 1.9 MG/DL — LOW (ref 2.5–4.5)
PHOSPHATE SERPL-MCNC: 2 MG/DL — LOW (ref 2.5–4.5)
PHOSPHATE SERPL-MCNC: 2.1 MG/DL — LOW (ref 2.5–4.5)
PHOSPHATE SERPL-MCNC: 2.4 MG/DL — LOW (ref 2.5–4.5)
PHOSPHATE SERPL-MCNC: 2.5 MG/DL — SIGNIFICANT CHANGE UP (ref 2.5–4.5)
PHOSPHATE SERPL-MCNC: 2.6 MG/DL — SIGNIFICANT CHANGE UP (ref 2.5–4.5)
PHOSPHATE SERPL-MCNC: 2.7 MG/DL — SIGNIFICANT CHANGE UP (ref 2.5–4.5)
PHOSPHATE SERPL-MCNC: 2.7 MG/DL — SIGNIFICANT CHANGE UP (ref 2.5–4.5)
PHOSPHATE SERPL-MCNC: 2.9 MG/DL — SIGNIFICANT CHANGE UP (ref 2.5–4.5)
PLAT MORPH BLD: NORMAL — SIGNIFICANT CHANGE UP
PLATELET # BLD AUTO: 243 K/UL — SIGNIFICANT CHANGE UP (ref 150–400)
PLATELET # BLD AUTO: 277 K/UL — SIGNIFICANT CHANGE UP (ref 150–400)
PLATELET # BLD AUTO: 307 K/UL — SIGNIFICANT CHANGE UP (ref 150–400)
PLATELET # BLD AUTO: 360 K/UL — SIGNIFICANT CHANGE UP (ref 150–400)
PLATELET # BLD AUTO: 389 K/UL — SIGNIFICANT CHANGE UP (ref 150–400)
PLATELET # BLD AUTO: 412 K/UL — HIGH (ref 150–400)
PLATELET # BLD AUTO: 413 K/UL — HIGH (ref 150–400)
PLATELET # BLD AUTO: 419 K/UL — HIGH (ref 150–400)
PO2 BLDV: 34 MMHG — SIGNIFICANT CHANGE UP (ref 25–45)
POIKILOCYTOSIS BLD QL AUTO: SIGNIFICANT CHANGE UP
POLYCHROMASIA BLD QL SMEAR: SLIGHT — SIGNIFICANT CHANGE UP
POTASSIUM BLDV-SCNC: 3.6 MMOL/L — SIGNIFICANT CHANGE UP (ref 3.5–5.3)
POTASSIUM SERPL-MCNC: 3.7 MMOL/L — SIGNIFICANT CHANGE UP (ref 3.5–5.3)
POTASSIUM SERPL-MCNC: 4 MMOL/L — SIGNIFICANT CHANGE UP (ref 3.5–5.3)
POTASSIUM SERPL-MCNC: 4.1 MMOL/L — SIGNIFICANT CHANGE UP (ref 3.5–5.3)
POTASSIUM SERPL-MCNC: 4.2 MMOL/L — SIGNIFICANT CHANGE UP (ref 3.5–5.3)
POTASSIUM SERPL-MCNC: 4.2 MMOL/L — SIGNIFICANT CHANGE UP (ref 3.5–5.3)
POTASSIUM SERPL-MCNC: 5.1 MMOL/L — SIGNIFICANT CHANGE UP (ref 3.5–5.3)
POTASSIUM SERPL-SCNC: 3.7 MMOL/L — SIGNIFICANT CHANGE UP (ref 3.5–5.3)
POTASSIUM SERPL-SCNC: 4 MMOL/L — SIGNIFICANT CHANGE UP (ref 3.5–5.3)
POTASSIUM SERPL-SCNC: 4.1 MMOL/L — SIGNIFICANT CHANGE UP (ref 3.5–5.3)
POTASSIUM SERPL-SCNC: 4.2 MMOL/L — SIGNIFICANT CHANGE UP (ref 3.5–5.3)
POTASSIUM SERPL-SCNC: 4.2 MMOL/L — SIGNIFICANT CHANGE UP (ref 3.5–5.3)
POTASSIUM SERPL-SCNC: 5.1 MMOL/L — SIGNIFICANT CHANGE UP (ref 3.5–5.3)
PROCALCITONIN SERPL-MCNC: 0.26 NG/ML — HIGH (ref 0.02–0.1)
PROT SERPL-MCNC: 4.8 G/DL — LOW (ref 6–8.3)
PROT SERPL-MCNC: 4.8 G/DL — LOW (ref 6–8.3)
PROT SERPL-MCNC: 5 G/DL — LOW (ref 6–8.3)
PROT SERPL-MCNC: 5.3 G/DL — LOW (ref 6–8.3)
PROT SERPL-MCNC: 5.3 G/DL — LOW (ref 6–8.3)
PROT SERPL-MCNC: 5.4 G/DL — LOW (ref 6–8.3)
PROT SERPL-MCNC: 5.6 G/DL — LOW (ref 6–8.3)
PROT SERPL-MCNC: 5.8 G/DL — LOW (ref 6–8.3)
PROT UR-MCNC: ABNORMAL
PROT UR-MCNC: ABNORMAL
PROTHROM AB SERPL-ACNC: 12.4 SEC — SIGNIFICANT CHANGE UP (ref 10–12.9)
PROTHROM AB SERPL-ACNC: 13.4 SEC — HIGH (ref 10–12.9)
RBC # BLD: 3.39 M/UL — LOW (ref 3.8–5.2)
RBC # BLD: 3.41 M/UL — LOW (ref 3.8–5.2)
RBC # BLD: 3.69 M/UL — LOW (ref 3.8–5.2)
RBC # BLD: 3.7 M/UL — LOW (ref 3.8–5.2)
RBC # BLD: 3.79 M/UL — LOW (ref 3.8–5.2)
RBC # BLD: 3.9 M/UL — SIGNIFICANT CHANGE UP (ref 3.8–5.2)
RBC # BLD: 4.19 M/UL — SIGNIFICANT CHANGE UP (ref 3.8–5.2)
RBC # BLD: 4.2 M/UL — SIGNIFICANT CHANGE UP (ref 3.8–5.2)
RBC # FLD: 16.8 % — HIGH (ref 10.3–14.5)
RBC # FLD: 16.9 % — HIGH (ref 10.3–14.5)
RBC # FLD: 16.9 % — HIGH (ref 10.3–14.5)
RBC # FLD: 17.2 % — HIGH (ref 10.3–14.5)
RBC # FLD: 17.4 % — HIGH (ref 10.3–14.5)
RBC # FLD: 18.3 % — HIGH (ref 10.3–14.5)
RBC # FLD: 18.3 % — HIGH (ref 10.3–14.5)
RBC # FLD: 19.8 % — HIGH (ref 10.3–14.5)
RBC BLD AUTO: ABNORMAL
RBC CASTS # UR COMP ASSIST: 3 /HPF — SIGNIFICANT CHANGE UP (ref 0–4)
RBC CASTS # UR COMP ASSIST: 6 /HPF — HIGH (ref 0–4)
SAO2 % BLDV: 60 % — LOW (ref 67–88)
SARS-COV-2 RNA SPEC QL NAA+PROBE: SIGNIFICANT CHANGE UP
SCHISTOCYTES BLD QL AUTO: SLIGHT — SIGNIFICANT CHANGE UP
SODIUM SERPL-SCNC: 133 MMOL/L — LOW (ref 135–145)
SODIUM SERPL-SCNC: 137 MMOL/L — SIGNIFICANT CHANGE UP (ref 135–145)
SODIUM SERPL-SCNC: 138 MMOL/L — SIGNIFICANT CHANGE UP (ref 135–145)
SODIUM SERPL-SCNC: 139 MMOL/L — SIGNIFICANT CHANGE UP (ref 135–145)
SODIUM SERPL-SCNC: 140 MMOL/L — SIGNIFICANT CHANGE UP (ref 135–145)
SODIUM SERPL-SCNC: 141 MMOL/L — SIGNIFICANT CHANGE UP (ref 135–145)
SODIUM SERPL-SCNC: 141 MMOL/L — SIGNIFICANT CHANGE UP (ref 135–145)
SODIUM SERPL-SCNC: 142 MMOL/L — SIGNIFICANT CHANGE UP (ref 135–145)
SODIUM SERPL-SCNC: 142 MMOL/L — SIGNIFICANT CHANGE UP (ref 135–145)
SODIUM SERPL-SCNC: 143 MMOL/L — SIGNIFICANT CHANGE UP (ref 135–145)
SP GR SPEC: 1.02 — SIGNIFICANT CHANGE UP (ref 1.01–1.02)
SP GR SPEC: 1.03 — HIGH (ref 1.01–1.02)
SPECIMEN SOURCE: SIGNIFICANT CHANGE UP
TROPONIN T, HIGH SENSITIVITY RESULT: 145 NG/L — HIGH (ref 0–51)
TROPONIN T, HIGH SENSITIVITY RESULT: 177 NG/L — HIGH (ref 0–51)
TSH SERPL-MCNC: 4.77 UIU/ML — HIGH (ref 0.27–4.2)
UROBILINOGEN FLD QL: NEGATIVE — SIGNIFICANT CHANGE UP
UROBILINOGEN FLD QL: NEGATIVE — SIGNIFICANT CHANGE UP
VARIANT LYMPHS # BLD: 2 % — SIGNIFICANT CHANGE UP (ref 0–6)
WBC # BLD: 10.29 K/UL — SIGNIFICANT CHANGE UP (ref 3.8–10.5)
WBC # BLD: 11.09 K/UL — HIGH (ref 3.8–10.5)
WBC # BLD: 11.99 K/UL — HIGH (ref 3.8–10.5)
WBC # BLD: 13.16 K/UL — HIGH (ref 3.8–10.5)
WBC # BLD: 13.56 K/UL — HIGH (ref 3.8–10.5)
WBC # BLD: 17.95 K/UL — HIGH (ref 3.8–10.5)
WBC # BLD: 9.78 K/UL — SIGNIFICANT CHANGE UP (ref 3.8–10.5)
WBC # BLD: 9.97 K/UL — SIGNIFICANT CHANGE UP (ref 3.8–10.5)
WBC # FLD AUTO: 10.29 K/UL — SIGNIFICANT CHANGE UP (ref 3.8–10.5)
WBC # FLD AUTO: 11.09 K/UL — HIGH (ref 3.8–10.5)
WBC # FLD AUTO: 11.99 K/UL — HIGH (ref 3.8–10.5)
WBC # FLD AUTO: 13.16 K/UL — HIGH (ref 3.8–10.5)
WBC # FLD AUTO: 13.56 K/UL — HIGH (ref 3.8–10.5)
WBC # FLD AUTO: 17.95 K/UL — HIGH (ref 3.8–10.5)
WBC # FLD AUTO: 9.78 K/UL — SIGNIFICANT CHANGE UP (ref 3.8–10.5)
WBC # FLD AUTO: 9.97 K/UL — SIGNIFICANT CHANGE UP (ref 3.8–10.5)
WBC UR QL: 152 /HPF — HIGH (ref 0–5)
WBC UR QL: >50 /HPF — HIGH (ref 0–5)

## 2020-01-01 PROCEDURE — 70450 CT HEAD/BRAIN W/O DYE: CPT | Mod: 26

## 2020-01-01 PROCEDURE — 87150 DNA/RNA AMPLIFIED PROBE: CPT

## 2020-01-01 PROCEDURE — 93010 ELECTROCARDIOGRAM REPORT: CPT

## 2020-01-01 PROCEDURE — 84145 PROCALCITONIN (PCT): CPT

## 2020-01-01 PROCEDURE — 99239 HOSP IP/OBS DSCHRG MGMT >30: CPT

## 2020-01-01 PROCEDURE — 99233 SBSQ HOSP IP/OBS HIGH 50: CPT | Mod: GC

## 2020-01-01 PROCEDURE — 82803 BLOOD GASES ANY COMBINATION: CPT

## 2020-01-01 PROCEDURE — 82977 ASSAY OF GGT: CPT

## 2020-01-01 PROCEDURE — 81001 URINALYSIS AUTO W/SCOPE: CPT

## 2020-01-01 PROCEDURE — 99213 OFFICE O/P EST LOW 20 MIN: CPT | Mod: 25

## 2020-01-01 PROCEDURE — 83605 ASSAY OF LACTIC ACID: CPT

## 2020-01-01 PROCEDURE — 74176 CT ABD & PELVIS W/O CONTRAST: CPT

## 2020-01-01 PROCEDURE — 99232 SBSQ HOSP IP/OBS MODERATE 35: CPT

## 2020-01-01 PROCEDURE — 84443 ASSAY THYROID STIM HORMONE: CPT

## 2020-01-01 PROCEDURE — 87040 BLOOD CULTURE FOR BACTERIA: CPT

## 2020-01-01 PROCEDURE — 82947 ASSAY GLUCOSE BLOOD QUANT: CPT

## 2020-01-01 PROCEDURE — 99291 CRITICAL CARE FIRST HOUR: CPT | Mod: GC

## 2020-01-01 PROCEDURE — 85730 THROMBOPLASTIN TIME PARTIAL: CPT

## 2020-01-01 PROCEDURE — 92014 COMPRE OPH EXAM EST PT 1/>: CPT

## 2020-01-01 PROCEDURE — 71250 CT THORAX DX C-: CPT | Mod: 26

## 2020-01-01 PROCEDURE — 99223 1ST HOSP IP/OBS HIGH 75: CPT | Mod: GC

## 2020-01-01 PROCEDURE — 93005 ELECTROCARDIOGRAM TRACING: CPT | Mod: XU

## 2020-01-01 PROCEDURE — 84295 ASSAY OF SERUM SODIUM: CPT

## 2020-01-01 PROCEDURE — 85610 PROTHROMBIN TIME: CPT

## 2020-01-01 PROCEDURE — 85014 HEMATOCRIT: CPT

## 2020-01-01 PROCEDURE — 84484 ASSAY OF TROPONIN QUANT: CPT

## 2020-01-01 PROCEDURE — 99232 SBSQ HOSP IP/OBS MODERATE 35: CPT | Mod: GC

## 2020-01-01 PROCEDURE — 80048 BASIC METABOLIC PNL TOTAL CA: CPT

## 2020-01-01 PROCEDURE — 83735 ASSAY OF MAGNESIUM: CPT

## 2020-01-01 PROCEDURE — 97110 THERAPEUTIC EXERCISES: CPT

## 2020-01-01 PROCEDURE — 87449 NOS EACH ORGANISM AG IA: CPT

## 2020-01-01 PROCEDURE — 96376 TX/PRO/DX INJ SAME DRUG ADON: CPT | Mod: XU

## 2020-01-01 PROCEDURE — 82553 CREATINE MB FRACTION: CPT

## 2020-01-01 PROCEDURE — 80053 COMPREHEN METABOLIC PANEL: CPT

## 2020-01-01 PROCEDURE — 51702 INSERT TEMP BLADDER CATH: CPT

## 2020-01-01 PROCEDURE — 82330 ASSAY OF CALCIUM: CPT

## 2020-01-01 PROCEDURE — 82728 ASSAY OF FERRITIN: CPT

## 2020-01-01 PROCEDURE — 74176 CT ABD & PELVIS W/O CONTRAST: CPT | Mod: 26

## 2020-01-01 PROCEDURE — 82435 ASSAY OF BLOOD CHLORIDE: CPT

## 2020-01-01 PROCEDURE — 99292 CRITICAL CARE ADDL 30 MIN: CPT | Mod: CS

## 2020-01-01 PROCEDURE — 92202 OPSCPY EXTND ON/MAC DRAW: CPT

## 2020-01-01 PROCEDURE — 96375 TX/PRO/DX INJ NEW DRUG ADDON: CPT | Mod: XU

## 2020-01-01 PROCEDURE — 71045 X-RAY EXAM CHEST 1 VIEW: CPT | Mod: 26

## 2020-01-01 PROCEDURE — 86140 C-REACTIVE PROTEIN: CPT

## 2020-01-01 PROCEDURE — 97162 PT EVAL MOD COMPLEX 30 MIN: CPT

## 2020-01-01 PROCEDURE — 96374 THER/PROPH/DIAG INJ IV PUSH: CPT | Mod: XU

## 2020-01-01 PROCEDURE — 99291 CRITICAL CARE FIRST HOUR: CPT | Mod: CS,GC

## 2020-01-01 PROCEDURE — 71045 X-RAY EXAM CHEST 1 VIEW: CPT

## 2020-01-01 PROCEDURE — 87324 CLOSTRIDIUM AG IA: CPT

## 2020-01-01 PROCEDURE — 71250 CT THORAX DX C-: CPT

## 2020-01-01 PROCEDURE — 94640 AIRWAY INHALATION TREATMENT: CPT

## 2020-01-01 PROCEDURE — 83880 ASSAY OF NATRIURETIC PEPTIDE: CPT

## 2020-01-01 PROCEDURE — 99292 CRITICAL CARE ADDL 30 MIN: CPT | Mod: 25

## 2020-01-01 PROCEDURE — 51701 INSERT BLADDER CATHETER: CPT

## 2020-01-01 PROCEDURE — 99291 CRITICAL CARE FIRST HOUR: CPT | Mod: 25

## 2020-01-01 PROCEDURE — 97530 THERAPEUTIC ACTIVITIES: CPT

## 2020-01-01 PROCEDURE — 87086 URINE CULTURE/COLONY COUNT: CPT

## 2020-01-01 PROCEDURE — 87186 SC STD MICRODIL/AGAR DIL: CPT

## 2020-01-01 PROCEDURE — 85027 COMPLETE CBC AUTOMATED: CPT

## 2020-01-01 PROCEDURE — 87804 INFLUENZA ASSAY W/OPTIC: CPT | Mod: QW

## 2020-01-01 PROCEDURE — 99285 EMERGENCY DEPT VISIT HI MDM: CPT | Mod: 25

## 2020-01-01 PROCEDURE — 84100 ASSAY OF PHOSPHORUS: CPT

## 2020-01-01 PROCEDURE — 84132 ASSAY OF SERUM POTASSIUM: CPT

## 2020-01-01 PROCEDURE — 70450 CT HEAD/BRAIN W/O DYE: CPT

## 2020-01-01 PROCEDURE — 82962 GLUCOSE BLOOD TEST: CPT

## 2020-01-01 RX ORDER — CEFEPIME 1 G/1
500 INJECTION, POWDER, FOR SOLUTION INTRAMUSCULAR; INTRAVENOUS EVERY 24 HOURS
Refills: 0 | Status: COMPLETED | OUTPATIENT
Start: 2020-01-01 | End: 2020-01-01

## 2020-01-01 RX ORDER — GABAPENTIN 400 MG/1
1 CAPSULE ORAL
Qty: 0 | Refills: 0 | DISCHARGE

## 2020-01-01 RX ORDER — DONEPEZIL HYDROCHLORIDE 10 MG/1
1 TABLET, FILM COATED ORAL
Qty: 0 | Refills: 0 | DISCHARGE

## 2020-01-01 RX ORDER — VANCOMYCIN HCL 1 G
750 VIAL (EA) INTRAVENOUS ONCE
Refills: 0 | Status: COMPLETED | OUTPATIENT
Start: 2020-01-01 | End: 2020-01-01

## 2020-01-01 RX ORDER — FLUTICASONE PROPIONATE 50 MCG
1 SPRAY, SUSPENSION NASAL DAILY
Refills: 0 | Status: DISCONTINUED | OUTPATIENT
Start: 2020-01-01 | End: 2020-01-01

## 2020-01-01 RX ORDER — PANTOPRAZOLE SODIUM 20 MG/1
1 TABLET, DELAYED RELEASE ORAL
Qty: 0 | Refills: 0 | DISCHARGE

## 2020-01-01 RX ORDER — SODIUM CHLORIDE 9 MG/ML
1000 INJECTION INTRAMUSCULAR; INTRAVENOUS; SUBCUTANEOUS ONCE
Refills: 0 | Status: COMPLETED | OUTPATIENT
Start: 2020-01-01 | End: 2020-01-01

## 2020-01-01 RX ORDER — SODIUM CHLORIDE 9 MG/ML
1000 INJECTION, SOLUTION INTRAVENOUS
Refills: 0 | Status: DISCONTINUED | OUTPATIENT
Start: 2020-01-01 | End: 2020-01-01

## 2020-01-01 RX ORDER — POTASSIUM PHOSPHATE, MONOBASIC POTASSIUM PHOSPHATE, DIBASIC 236; 224 MG/ML; MG/ML
15 INJECTION, SOLUTION INTRAVENOUS ONCE
Refills: 0 | Status: COMPLETED | OUTPATIENT
Start: 2020-01-01 | End: 2020-01-01

## 2020-01-01 RX ORDER — DOXYCYCLINE HYCLATE 100 MG/1
100 CAPSULE ORAL
Qty: 14 | Refills: 0 | Status: DISCONTINUED | COMMUNITY
Start: 2019-01-01 | End: 2020-01-01

## 2020-01-01 RX ORDER — GABAPENTIN 100 MG/1
100 CAPSULE ORAL 3 TIMES DAILY
Qty: 540 | Refills: 0 | Status: DISCONTINUED | COMMUNITY
Start: 2019-01-01 | End: 2020-01-01

## 2020-01-01 RX ORDER — FLUTICASONE PROPIONATE 50 UG/1
50 SPRAY, METERED NASAL DAILY
Qty: 1 | Refills: 3 | Status: ACTIVE | COMMUNITY
Start: 2020-01-01 | End: 1900-01-01

## 2020-01-01 RX ORDER — SODIUM,POTASSIUM PHOSPHATES 278-250MG
1 POWDER IN PACKET (EA) ORAL
Refills: 0 | Status: COMPLETED | OUTPATIENT
Start: 2020-01-01 | End: 2020-01-01

## 2020-01-01 RX ORDER — ALBUMIN HUMAN 25 %
500 VIAL (ML) INTRAVENOUS ONCE
Refills: 0 | Status: DISCONTINUED | OUTPATIENT
Start: 2020-01-01 | End: 2020-01-01

## 2020-01-01 RX ORDER — PHENYLEPHRINE HYDROCHLORIDE 10 MG/ML
0.5 INJECTION INTRAVENOUS
Qty: 160 | Refills: 0 | Status: DISCONTINUED | OUTPATIENT
Start: 2020-01-01 | End: 2020-01-01

## 2020-01-01 RX ORDER — ATORVASTATIN CALCIUM 80 MG/1
40 TABLET, FILM COATED ORAL AT BEDTIME
Refills: 0 | Status: DISCONTINUED | OUTPATIENT
Start: 2020-01-01 | End: 2020-01-01

## 2020-01-01 RX ORDER — OLOPATADINE HYDROCHLORIDE 1 MG/ML
1 SOLUTION/ DROPS OPHTHALMIC
Qty: 0 | Refills: 0 | DISCHARGE

## 2020-01-01 RX ORDER — ROBINUL 0.2 MG/ML
0.4 INJECTION INTRAMUSCULAR; INTRAVENOUS EVERY 6 HOURS
Refills: 0 | Status: DISCONTINUED | OUTPATIENT
Start: 2020-01-01 | End: 2020-01-01

## 2020-01-01 RX ORDER — ASCORBIC ACID 60 MG
1 TABLET,CHEWABLE ORAL
Qty: 0 | Refills: 0 | DISCHARGE
Start: 2020-01-01

## 2020-01-01 RX ORDER — CHOLECALCIFEROL (VITAMIN D3) 125 MCG
1 CAPSULE ORAL
Qty: 0 | Refills: 0 | DISCHARGE

## 2020-01-01 RX ORDER — CEFTRIAXONE 500 MG/1
1000 INJECTION, POWDER, FOR SOLUTION INTRAMUSCULAR; INTRAVENOUS EVERY 24 HOURS
Refills: 0 | Status: DISCONTINUED | OUTPATIENT
Start: 2020-01-01 | End: 2020-01-01

## 2020-01-01 RX ORDER — AMLODIPINE BESYLATE 2.5 MG/1
1 TABLET ORAL
Qty: 0 | Refills: 0 | DISCHARGE

## 2020-01-01 RX ORDER — GABAPENTIN 400 MG/1
300 CAPSULE ORAL DAILY
Refills: 0 | Status: DISCONTINUED | OUTPATIENT
Start: 2020-01-01 | End: 2020-01-01

## 2020-01-01 RX ORDER — FENTANYL CITRATE 50 UG/ML
25 INJECTION INTRAVENOUS ONCE
Refills: 0 | Status: DISCONTINUED | OUTPATIENT
Start: 2020-01-01 | End: 2020-01-01

## 2020-01-01 RX ORDER — CEPHALEXIN 500 MG
1 CAPSULE ORAL
Qty: 0 | Refills: 0 | DISCHARGE

## 2020-01-01 RX ORDER — ATORVASTATIN CALCIUM 80 MG/1
1 TABLET, FILM COATED ORAL
Qty: 0 | Refills: 0 | DISCHARGE

## 2020-01-01 RX ORDER — FLUTICASONE PROPIONATE 50 MCG
1 SPRAY, SUSPENSION NASAL
Qty: 0 | Refills: 0 | DISCHARGE

## 2020-01-01 RX ORDER — ACETAMINOPHEN 500 MG
1 TABLET ORAL
Qty: 0 | Refills: 0 | DISCHARGE

## 2020-01-01 RX ORDER — CARVEDILOL PHOSPHATE 80 MG/1
1 CAPSULE, EXTENDED RELEASE ORAL
Qty: 0 | Refills: 0 | DISCHARGE

## 2020-01-01 RX ORDER — HEPARIN SODIUM 5000 [USP'U]/ML
5000 INJECTION INTRAVENOUS; SUBCUTANEOUS EVERY 8 HOURS
Refills: 0 | Status: DISCONTINUED | OUTPATIENT
Start: 2020-01-01 | End: 2020-01-01

## 2020-01-01 RX ORDER — ALBUMIN HUMAN 25 %
250 VIAL (ML) INTRAVENOUS ONCE
Refills: 0 | Status: DISCONTINUED | OUTPATIENT
Start: 2020-01-01 | End: 2020-01-01

## 2020-01-01 RX ORDER — OSTOMY ADHESIVE
STRIP MISCELLANEOUS
Qty: 3 | Refills: 3 | Status: ACTIVE | COMMUNITY
Start: 2020-01-01 | End: 1900-01-01

## 2020-01-01 RX ORDER — TORSEMIDE 10 MG/1
10 TABLET ORAL DAILY
Qty: 90 | Refills: 0 | Status: ACTIVE | COMMUNITY
Start: 2018-09-10 | End: 1900-01-01

## 2020-01-01 RX ORDER — ACETAMINOPHEN 650 MG/1
650 TABLET, FILM COATED, EXTENDED RELEASE ORAL
Qty: 60 | Refills: 0 | Status: DISCONTINUED | COMMUNITY
Start: 2019-01-01 | End: 2020-01-01

## 2020-01-01 RX ORDER — SODIUM CHLORIDE 9 MG/ML
1000 INJECTION INTRAMUSCULAR; INTRAVENOUS; SUBCUTANEOUS
Refills: 0 | Status: DISCONTINUED | OUTPATIENT
Start: 2020-01-01 | End: 2020-01-01

## 2020-01-01 RX ORDER — FLUOCINOLONE ACETONIDE 0.11 MG/20ML
1 OIL AURICULAR (OTIC)
Qty: 0 | Refills: 0 | DISCHARGE

## 2020-01-01 RX ORDER — SODIUM CHLORIDE 9 MG/ML
500 INJECTION INTRAMUSCULAR; INTRAVENOUS; SUBCUTANEOUS ONCE
Refills: 0 | Status: COMPLETED | OUTPATIENT
Start: 2020-01-01 | End: 2020-01-01

## 2020-01-01 RX ORDER — SODIUM CHLORIDE 9 MG/ML
250 INJECTION, SOLUTION INTRAVENOUS ONCE
Refills: 0 | Status: COMPLETED | OUTPATIENT
Start: 2020-01-01 | End: 2020-01-01

## 2020-01-01 RX ORDER — ACETAMINOPHEN 500 MG
650 TABLET ORAL ONCE
Refills: 0 | Status: COMPLETED | OUTPATIENT
Start: 2020-01-01 | End: 2020-01-01

## 2020-01-01 RX ORDER — NOREPINEPHRINE BITARTRATE/D5W 8 MG/250ML
2 PLASTIC BAG, INJECTION (ML) INTRAVENOUS
Qty: 8 | Refills: 0 | Status: DISCONTINUED | OUTPATIENT
Start: 2020-01-01 | End: 2020-01-01

## 2020-01-01 RX ORDER — POTASSIUM CITRATE MONOHYDRATE 100 %
1 POWDER (GRAM) MISCELLANEOUS
Qty: 0 | Refills: 0 | DISCHARGE

## 2020-01-01 RX ORDER — FENTANYL CITRATE 50 UG/ML
10 INJECTION INTRAVENOUS ONCE
Refills: 0 | Status: DISCONTINUED | OUTPATIENT
Start: 2020-01-01 | End: 2020-01-01

## 2020-01-01 RX ORDER — METRONIDAZOLE 500 MG
500 TABLET ORAL ONCE
Refills: 0 | Status: DISCONTINUED | OUTPATIENT
Start: 2020-01-01 | End: 2020-01-01

## 2020-01-01 RX ORDER — ASPIRIN/CALCIUM CARB/MAGNESIUM 324 MG
1 TABLET ORAL
Qty: 0 | Refills: 0 | DISCHARGE

## 2020-01-01 RX ORDER — ASCORBIC ACID 60 MG
500 TABLET,CHEWABLE ORAL DAILY
Refills: 0 | Status: DISCONTINUED | OUTPATIENT
Start: 2020-01-01 | End: 2020-01-01

## 2020-01-01 RX ORDER — COLLAGENASE CLOSTRIDIUM HIST. 250 UNIT/G
1 OINTMENT (GRAM) TOPICAL ONCE
Refills: 0 | Status: COMPLETED | OUTPATIENT
Start: 2020-01-01 | End: 2020-01-01

## 2020-01-01 RX ORDER — CEFEPIME 1 G/1
2000 INJECTION, POWDER, FOR SOLUTION INTRAMUSCULAR; INTRAVENOUS ONCE
Refills: 0 | Status: COMPLETED | OUTPATIENT
Start: 2020-01-01 | End: 2020-01-01

## 2020-01-01 RX ORDER — CEFTRIAXONE 500 MG/1
1000 INJECTION, POWDER, FOR SOLUTION INTRAMUSCULAR; INTRAVENOUS ONCE
Refills: 0 | Status: COMPLETED | OUTPATIENT
Start: 2020-01-01 | End: 2020-01-01

## 2020-01-01 RX ORDER — ASPIRIN/CALCIUM CARB/MAGNESIUM 324 MG
81 TABLET ORAL DAILY
Refills: 0 | Status: DISCONTINUED | OUTPATIENT
Start: 2020-01-01 | End: 2020-01-01

## 2020-01-01 RX ORDER — DONEPEZIL HYDROCHLORIDE 10 MG/1
1 TABLET, FILM COATED ORAL
Qty: 0 | Refills: 0 | DISCHARGE
Start: 2020-01-01

## 2020-01-01 RX ORDER — AMLODIPINE BESYLATE 2.5 MG/1
2.5 TABLET ORAL DAILY
Refills: 0 | Status: DISCONTINUED | OUTPATIENT
Start: 2020-01-01 | End: 2020-01-01

## 2020-01-01 RX ORDER — PETROLATUM/STEARYL ALC/CHOLEST
OINTMENT (GRAM) TOPICAL
Qty: 1 | Refills: 3 | Status: ACTIVE | COMMUNITY
Start: 2019-01-01 | End: 1900-01-01

## 2020-01-01 RX ORDER — METRONIDAZOLE 500 MG
500 TABLET ORAL ONCE
Refills: 0 | Status: COMPLETED | OUTPATIENT
Start: 2020-01-01 | End: 2020-01-01

## 2020-01-01 RX ORDER — HEPARIN SODIUM 5000 [USP'U]/ML
5000 INJECTION INTRAVENOUS; SUBCUTANEOUS EVERY 12 HOURS
Refills: 0 | Status: DISCONTINUED | OUTPATIENT
Start: 2020-01-01 | End: 2020-01-01

## 2020-01-01 RX ORDER — SODIUM,POTASSIUM PHOSPHATES 278-250MG
1 POWDER IN PACKET (EA) ORAL
Refills: 0 | Status: DISCONTINUED | OUTPATIENT
Start: 2020-01-01 | End: 2020-01-01

## 2020-01-01 RX ORDER — HYDROMORPHONE HYDROCHLORIDE 2 MG/ML
0.5 INJECTION INTRAMUSCULAR; INTRAVENOUS; SUBCUTANEOUS
Refills: 0 | Status: DISCONTINUED | OUTPATIENT
Start: 2020-01-01 | End: 2020-01-01

## 2020-01-01 RX ORDER — VANCOMYCIN HCL 1 G
125 VIAL (EA) INTRAVENOUS EVERY 6 HOURS
Refills: 0 | Status: COMPLETED | OUTPATIENT
Start: 2020-01-01 | End: 2020-01-01

## 2020-01-01 RX ORDER — CARVEDILOL PHOSPHATE 80 MG/1
6.25 CAPSULE, EXTENDED RELEASE ORAL EVERY 12 HOURS
Refills: 0 | Status: DISCONTINUED | OUTPATIENT
Start: 2020-01-01 | End: 2020-01-01

## 2020-01-01 RX ORDER — CETIRIZINE HYDROCHLORIDE 10 MG/1
1 TABLET ORAL
Qty: 0 | Refills: 0 | DISCHARGE

## 2020-01-01 RX ADMIN — Medication 125 MILLIGRAM(S): at 11:36

## 2020-01-01 RX ADMIN — POTASSIUM PHOSPHATE, MONOBASIC POTASSIUM PHOSPHATE, DIBASIC 62.5 MILLIMOLE(S): 236; 224 INJECTION, SOLUTION INTRAVENOUS at 10:45

## 2020-01-01 RX ADMIN — HEPARIN SODIUM 5000 UNIT(S): 5000 INJECTION INTRAVENOUS; SUBCUTANEOUS at 06:02

## 2020-01-01 RX ADMIN — Medication 1 TABLET(S): at 12:38

## 2020-01-01 RX ADMIN — AMLODIPINE BESYLATE 2.5 MILLIGRAM(S): 2.5 TABLET ORAL at 11:45

## 2020-01-01 RX ADMIN — Medication 125 MILLIGRAM(S): at 00:20

## 2020-01-01 RX ADMIN — HEPARIN SODIUM 5000 UNIT(S): 5000 INJECTION INTRAVENOUS; SUBCUTANEOUS at 06:12

## 2020-01-01 RX ADMIN — Medication 125 MILLIGRAM(S): at 06:44

## 2020-01-01 RX ADMIN — Medication 125 MILLIGRAM(S): at 11:22

## 2020-01-01 RX ADMIN — GABAPENTIN 300 MILLIGRAM(S): 400 CAPSULE ORAL at 12:36

## 2020-01-01 RX ADMIN — SODIUM CHLORIDE 75 MILLILITER(S): 9 INJECTION, SOLUTION INTRAVENOUS at 19:30

## 2020-01-01 RX ADMIN — Medication 125 MILLIGRAM(S): at 18:04

## 2020-01-01 RX ADMIN — Medication 500 MILLIGRAM(S): at 11:36

## 2020-01-01 RX ADMIN — ATORVASTATIN CALCIUM 40 MILLIGRAM(S): 80 TABLET, FILM COATED ORAL at 22:38

## 2020-01-01 RX ADMIN — Medication 125 MILLIGRAM(S): at 23:18

## 2020-01-01 RX ADMIN — Medication 1 SPRAY(S): at 12:16

## 2020-01-01 RX ADMIN — SODIUM CHLORIDE 1000 MILLILITER(S): 9 INJECTION INTRAMUSCULAR; INTRAVENOUS; SUBCUTANEOUS at 06:58

## 2020-01-01 RX ADMIN — Medication 81 MILLIGRAM(S): at 10:47

## 2020-01-01 RX ADMIN — Medication 81 MILLIGRAM(S): at 12:38

## 2020-01-01 RX ADMIN — CARVEDILOL PHOSPHATE 6.25 MILLIGRAM(S): 80 CAPSULE, EXTENDED RELEASE ORAL at 18:04

## 2020-01-01 RX ADMIN — Medication 1 TABLET(S): at 11:30

## 2020-01-01 RX ADMIN — POTASSIUM PHOSPHATE, MONOBASIC POTASSIUM PHOSPHATE, DIBASIC 62.5 MILLIMOLE(S): 236; 224 INJECTION, SOLUTION INTRAVENOUS at 09:55

## 2020-01-01 RX ADMIN — Medication 1 TABLET(S): at 12:36

## 2020-01-01 RX ADMIN — Medication 81 MILLIGRAM(S): at 11:39

## 2020-01-01 RX ADMIN — ATORVASTATIN CALCIUM 40 MILLIGRAM(S): 80 TABLET, FILM COATED ORAL at 23:29

## 2020-01-01 RX ADMIN — HEPARIN SODIUM 5000 UNIT(S): 5000 INJECTION INTRAVENOUS; SUBCUTANEOUS at 12:03

## 2020-01-01 RX ADMIN — ATORVASTATIN CALCIUM 40 MILLIGRAM(S): 80 TABLET, FILM COATED ORAL at 22:35

## 2020-01-01 RX ADMIN — ATORVASTATIN CALCIUM 40 MILLIGRAM(S): 80 TABLET, FILM COATED ORAL at 21:57

## 2020-01-01 RX ADMIN — SODIUM CHLORIDE 70 MILLILITER(S): 9 INJECTION INTRAMUSCULAR; INTRAVENOUS; SUBCUTANEOUS at 05:35

## 2020-01-01 RX ADMIN — Medication 1 TABLET(S): at 13:16

## 2020-01-01 RX ADMIN — GABAPENTIN 300 MILLIGRAM(S): 400 CAPSULE ORAL at 11:39

## 2020-01-01 RX ADMIN — Medication 125 MILLIGRAM(S): at 11:40

## 2020-01-01 RX ADMIN — Medication 125 MILLIGRAM(S): at 23:58

## 2020-01-01 RX ADMIN — ATORVASTATIN CALCIUM 40 MILLIGRAM(S): 80 TABLET, FILM COATED ORAL at 21:05

## 2020-01-01 RX ADMIN — AMLODIPINE BESYLATE 2.5 MILLIGRAM(S): 2.5 TABLET ORAL at 06:02

## 2020-01-01 RX ADMIN — Medication 1 SPRAY(S): at 18:50

## 2020-01-01 RX ADMIN — Medication 81 MILLIGRAM(S): at 13:16

## 2020-01-01 RX ADMIN — CARVEDILOL PHOSPHATE 6.25 MILLIGRAM(S): 80 CAPSULE, EXTENDED RELEASE ORAL at 18:51

## 2020-01-01 RX ADMIN — SODIUM CHLORIDE 75 MILLILITER(S): 9 INJECTION, SOLUTION INTRAVENOUS at 17:04

## 2020-01-01 RX ADMIN — Medication 500 MILLIGRAM(S): at 13:16

## 2020-01-01 RX ADMIN — CEFEPIME 100 MILLIGRAM(S): 1 INJECTION, POWDER, FOR SOLUTION INTRAMUSCULAR; INTRAVENOUS at 02:00

## 2020-01-01 RX ADMIN — Medication 125 MILLIGRAM(S): at 23:05

## 2020-01-01 RX ADMIN — ATORVASTATIN CALCIUM 40 MILLIGRAM(S): 80 TABLET, FILM COATED ORAL at 21:16

## 2020-01-01 RX ADMIN — Medication 125 MILLIGRAM(S): at 05:09

## 2020-01-01 RX ADMIN — Medication 81 MILLIGRAM(S): at 11:13

## 2020-01-01 RX ADMIN — GABAPENTIN 300 MILLIGRAM(S): 400 CAPSULE ORAL at 11:43

## 2020-01-01 RX ADMIN — Medication 125 MILLIGRAM(S): at 00:23

## 2020-01-01 RX ADMIN — Medication 125 MILLIGRAM(S): at 05:02

## 2020-01-01 RX ADMIN — GABAPENTIN 300 MILLIGRAM(S): 400 CAPSULE ORAL at 12:38

## 2020-01-01 RX ADMIN — GABAPENTIN 300 MILLIGRAM(S): 400 CAPSULE ORAL at 11:22

## 2020-01-01 RX ADMIN — CARVEDILOL PHOSPHATE 6.25 MILLIGRAM(S): 80 CAPSULE, EXTENDED RELEASE ORAL at 17:36

## 2020-01-01 RX ADMIN — Medication 500 MILLIGRAM(S): at 12:15

## 2020-01-01 RX ADMIN — CEFTRIAXONE 100 MILLIGRAM(S): 500 INJECTION, POWDER, FOR SOLUTION INTRAMUSCULAR; INTRAVENOUS at 18:09

## 2020-01-01 RX ADMIN — ATORVASTATIN CALCIUM 40 MILLIGRAM(S): 80 TABLET, FILM COATED ORAL at 23:18

## 2020-01-01 RX ADMIN — Medication 125 MILLIGRAM(S): at 17:05

## 2020-01-01 RX ADMIN — HEPARIN SODIUM 5000 UNIT(S): 5000 INJECTION INTRAVENOUS; SUBCUTANEOUS at 17:25

## 2020-01-01 RX ADMIN — Medication 125 MILLIGRAM(S): at 23:29

## 2020-01-01 RX ADMIN — Medication 1 SPRAY(S): at 00:23

## 2020-01-01 RX ADMIN — Medication 125 MILLIGRAM(S): at 16:53

## 2020-01-01 RX ADMIN — AMLODIPINE BESYLATE 2.5 MILLIGRAM(S): 2.5 TABLET ORAL at 06:12

## 2020-01-01 RX ADMIN — SODIUM CHLORIDE 50 MILLILITER(S): 9 INJECTION, SOLUTION INTRAVENOUS at 12:04

## 2020-01-01 RX ADMIN — HEPARIN SODIUM 5000 UNIT(S): 5000 INJECTION INTRAVENOUS; SUBCUTANEOUS at 05:01

## 2020-01-01 RX ADMIN — Medication 125 MILLIGRAM(S): at 05:21

## 2020-01-01 RX ADMIN — GABAPENTIN 300 MILLIGRAM(S): 400 CAPSULE ORAL at 10:47

## 2020-01-01 RX ADMIN — Medication 1 TABLET(S): at 11:22

## 2020-01-01 RX ADMIN — Medication 1 TABLET(S): at 11:40

## 2020-01-01 RX ADMIN — Medication 125 MILLIGRAM(S): at 05:45

## 2020-01-01 RX ADMIN — ATORVASTATIN CALCIUM 40 MILLIGRAM(S): 80 TABLET, FILM COATED ORAL at 21:54

## 2020-01-01 RX ADMIN — HEPARIN SODIUM 5000 UNIT(S): 5000 INJECTION INTRAVENOUS; SUBCUTANEOUS at 18:04

## 2020-01-01 RX ADMIN — Medication 81 MILLIGRAM(S): at 11:44

## 2020-01-01 RX ADMIN — Medication 81 MILLIGRAM(S): at 11:22

## 2020-01-01 RX ADMIN — Medication 1 TABLET(S): at 21:57

## 2020-01-01 RX ADMIN — Medication 1 SPRAY(S): at 13:16

## 2020-01-01 RX ADMIN — SODIUM CHLORIDE 333.33 MILLILITER(S): 9 INJECTION INTRAMUSCULAR; INTRAVENOUS; SUBCUTANEOUS at 20:40

## 2020-01-01 RX ADMIN — Medication 125 MILLIGRAM(S): at 18:23

## 2020-01-01 RX ADMIN — Medication 1 SPRAY(S): at 11:21

## 2020-01-01 RX ADMIN — CEFEPIME 100 MILLIGRAM(S): 1 INJECTION, POWDER, FOR SOLUTION INTRAMUSCULAR; INTRAVENOUS at 00:19

## 2020-01-01 RX ADMIN — HEPARIN SODIUM 5000 UNIT(S): 5000 INJECTION INTRAVENOUS; SUBCUTANEOUS at 17:37

## 2020-01-01 RX ADMIN — FENTANYL CITRATE 10 MICROGRAM(S): 50 INJECTION INTRAVENOUS at 19:45

## 2020-01-01 RX ADMIN — CEFTRIAXONE 100 MILLIGRAM(S): 500 INJECTION, POWDER, FOR SOLUTION INTRAMUSCULAR; INTRAVENOUS at 16:52

## 2020-01-01 RX ADMIN — CARVEDILOL PHOSPHATE 6.25 MILLIGRAM(S): 80 CAPSULE, EXTENDED RELEASE ORAL at 18:23

## 2020-01-01 RX ADMIN — GABAPENTIN 300 MILLIGRAM(S): 400 CAPSULE ORAL at 13:16

## 2020-01-01 RX ADMIN — CEFEPIME 100 MILLIGRAM(S): 1 INJECTION, POWDER, FOR SOLUTION INTRAMUSCULAR; INTRAVENOUS at 18:25

## 2020-01-01 RX ADMIN — Medication 100 MILLIGRAM(S): at 19:30

## 2020-01-01 RX ADMIN — Medication 125 MILLIGRAM(S): at 16:31

## 2020-01-01 RX ADMIN — HEPARIN SODIUM 5000 UNIT(S): 5000 INJECTION INTRAVENOUS; SUBCUTANEOUS at 05:45

## 2020-01-01 RX ADMIN — Medication 500 MILLIGRAM(S): at 11:22

## 2020-01-01 RX ADMIN — Medication 125 MILLIGRAM(S): at 05:33

## 2020-01-01 RX ADMIN — GABAPENTIN 300 MILLIGRAM(S): 400 CAPSULE ORAL at 11:36

## 2020-01-01 RX ADMIN — GABAPENTIN 300 MILLIGRAM(S): 400 CAPSULE ORAL at 11:13

## 2020-01-01 RX ADMIN — ATORVASTATIN CALCIUM 40 MILLIGRAM(S): 80 TABLET, FILM COATED ORAL at 21:12

## 2020-01-01 RX ADMIN — Medication 1 SPRAY(S): at 11:36

## 2020-01-01 RX ADMIN — Medication 81 MILLIGRAM(S): at 11:36

## 2020-01-01 RX ADMIN — HEPARIN SODIUM 5000 UNIT(S): 5000 INJECTION INTRAVENOUS; SUBCUTANEOUS at 17:30

## 2020-01-01 RX ADMIN — GABAPENTIN 300 MILLIGRAM(S): 400 CAPSULE ORAL at 12:30

## 2020-01-01 RX ADMIN — HEPARIN SODIUM 5000 UNIT(S): 5000 INJECTION INTRAVENOUS; SUBCUTANEOUS at 06:44

## 2020-01-01 RX ADMIN — HEPARIN SODIUM 5000 UNIT(S): 5000 INJECTION INTRAVENOUS; SUBCUTANEOUS at 17:36

## 2020-01-01 RX ADMIN — Medication 81 MILLIGRAM(S): at 12:30

## 2020-01-01 RX ADMIN — SODIUM CHLORIDE 250 MILLILITER(S): 9 INJECTION, SOLUTION INTRAVENOUS at 18:08

## 2020-01-01 RX ADMIN — AMLODIPINE BESYLATE 2.5 MILLIGRAM(S): 2.5 TABLET ORAL at 05:59

## 2020-01-01 RX ADMIN — Medication 125 MILLIGRAM(S): at 12:39

## 2020-01-01 RX ADMIN — Medication 1 SPRAY(S): at 12:38

## 2020-01-01 RX ADMIN — CARVEDILOL PHOSPHATE 6.25 MILLIGRAM(S): 80 CAPSULE, EXTENDED RELEASE ORAL at 06:13

## 2020-01-01 RX ADMIN — Medication 125 MILLIGRAM(S): at 17:36

## 2020-01-01 RX ADMIN — ROBINUL 0.4 MILLIGRAM(S): 0.2 INJECTION INTRAMUSCULAR; INTRAVENOUS at 15:59

## 2020-01-01 RX ADMIN — HEPARIN SODIUM 5000 UNIT(S): 5000 INJECTION INTRAVENOUS; SUBCUTANEOUS at 18:22

## 2020-01-01 RX ADMIN — HEPARIN SODIUM 5000 UNIT(S): 5000 INJECTION INTRAVENOUS; SUBCUTANEOUS at 17:05

## 2020-01-01 RX ADMIN — Medication 500 MILLIGRAM(S): at 12:36

## 2020-01-01 RX ADMIN — Medication 125 MILLIGRAM(S): at 17:25

## 2020-01-01 RX ADMIN — SODIUM CHLORIDE 1000 MILLILITER(S): 9 INJECTION INTRAMUSCULAR; INTRAVENOUS; SUBCUTANEOUS at 19:36

## 2020-01-01 RX ADMIN — Medication 125 MILLIGRAM(S): at 11:43

## 2020-01-01 RX ADMIN — HEPARIN SODIUM 5000 UNIT(S): 5000 INJECTION INTRAVENOUS; SUBCUTANEOUS at 05:26

## 2020-01-01 RX ADMIN — Medication 125 MILLIGRAM(S): at 05:14

## 2020-01-01 RX ADMIN — HEPARIN SODIUM 5000 UNIT(S): 5000 INJECTION INTRAVENOUS; SUBCUTANEOUS at 06:01

## 2020-01-01 RX ADMIN — Medication 1 TABLET(S): at 11:43

## 2020-01-01 RX ADMIN — Medication 125 MILLIGRAM(S): at 17:37

## 2020-01-01 RX ADMIN — Medication 125 MILLIGRAM(S): at 23:48

## 2020-01-01 RX ADMIN — Medication 1 SPRAY(S): at 11:44

## 2020-01-01 RX ADMIN — Medication 125 MILLIGRAM(S): at 11:13

## 2020-01-01 RX ADMIN — Medication 125 MILLIGRAM(S): at 06:01

## 2020-01-01 RX ADMIN — AMLODIPINE BESYLATE 2.5 MILLIGRAM(S): 2.5 TABLET ORAL at 05:01

## 2020-01-01 RX ADMIN — Medication 1 TABLET(S): at 12:30

## 2020-01-01 RX ADMIN — AMLODIPINE BESYLATE 2.5 MILLIGRAM(S): 2.5 TABLET ORAL at 06:01

## 2020-01-01 RX ADMIN — GABAPENTIN 300 MILLIGRAM(S): 400 CAPSULE ORAL at 12:15

## 2020-01-01 RX ADMIN — HEPARIN SODIUM 5000 UNIT(S): 5000 INJECTION INTRAVENOUS; SUBCUTANEOUS at 05:14

## 2020-01-01 RX ADMIN — Medication 1 SPRAY(S): at 11:39

## 2020-01-01 RX ADMIN — Medication 5 MILLIGRAM(S): at 05:59

## 2020-01-01 RX ADMIN — Medication 125 MILLIGRAM(S): at 10:48

## 2020-01-01 RX ADMIN — Medication 81 MILLIGRAM(S): at 12:15

## 2020-01-01 RX ADMIN — Medication 500 MILLIGRAM(S): at 12:38

## 2020-01-01 RX ADMIN — Medication 5 MILLIGRAM(S): at 11:22

## 2020-01-01 RX ADMIN — Medication 1 TABLET(S): at 11:36

## 2020-01-01 RX ADMIN — HEPARIN SODIUM 5000 UNIT(S): 5000 INJECTION INTRAVENOUS; SUBCUTANEOUS at 05:21

## 2020-01-01 RX ADMIN — CARVEDILOL PHOSPHATE 6.25 MILLIGRAM(S): 80 CAPSULE, EXTENDED RELEASE ORAL at 05:08

## 2020-01-01 RX ADMIN — Medication 500 MILLIGRAM(S): at 11:43

## 2020-01-01 RX ADMIN — Medication 150 MILLIGRAM(S): at 19:15

## 2020-01-01 RX ADMIN — SODIUM CHLORIDE 50 MILLILITER(S): 9 INJECTION, SOLUTION INTRAVENOUS at 14:03

## 2020-01-01 RX ADMIN — Medication 1 TABLET(S): at 12:15

## 2020-01-01 RX ADMIN — CARVEDILOL PHOSPHATE 6.25 MILLIGRAM(S): 80 CAPSULE, EXTENDED RELEASE ORAL at 17:49

## 2020-01-01 RX ADMIN — Medication 125 MILLIGRAM(S): at 05:26

## 2020-01-01 RX ADMIN — Medication 125 MILLIGRAM(S): at 00:42

## 2020-01-01 RX ADMIN — Medication 125 MILLIGRAM(S): at 17:49

## 2020-01-01 RX ADMIN — Medication 1 TABLET(S): at 11:44

## 2020-01-01 RX ADMIN — CARVEDILOL PHOSPHATE 6.25 MILLIGRAM(S): 80 CAPSULE, EXTENDED RELEASE ORAL at 06:01

## 2020-01-01 RX ADMIN — HEPARIN SODIUM 5000 UNIT(S): 5000 INJECTION INTRAVENOUS; SUBCUTANEOUS at 17:06

## 2020-01-01 RX ADMIN — SODIUM CHLORIDE 50 MILLILITER(S): 9 INJECTION INTRAMUSCULAR; INTRAVENOUS; SUBCUTANEOUS at 21:54

## 2020-01-01 RX ADMIN — Medication 125 MILLIGRAM(S): at 06:13

## 2020-01-01 RX ADMIN — AMLODIPINE BESYLATE 2.5 MILLIGRAM(S): 2.5 TABLET ORAL at 05:08

## 2020-01-01 RX ADMIN — ATORVASTATIN CALCIUM 40 MILLIGRAM(S): 80 TABLET, FILM COATED ORAL at 22:36

## 2020-01-01 RX ADMIN — Medication 1 APPLICATION(S): at 18:04

## 2020-01-01 RX ADMIN — Medication 1 SPRAY(S): at 12:36

## 2020-01-01 RX ADMIN — FENTANYL CITRATE 25 MICROGRAM(S): 50 INJECTION INTRAVENOUS at 22:10

## 2020-01-01 RX ADMIN — Medication 1 TABLET(S): at 11:13

## 2020-01-01 RX ADMIN — CEFEPIME 100 MILLIGRAM(S): 1 INJECTION, POWDER, FOR SOLUTION INTRAMUSCULAR; INTRAVENOUS at 01:29

## 2020-01-01 RX ADMIN — Medication 650 MILLIGRAM(S): at 12:36

## 2020-01-01 RX ADMIN — HEPARIN SODIUM 5000 UNIT(S): 5000 INJECTION INTRAVENOUS; SUBCUTANEOUS at 05:33

## 2020-01-01 RX ADMIN — FENTANYL CITRATE 25 MICROGRAM(S): 50 INJECTION INTRAVENOUS at 21:10

## 2020-01-01 RX ADMIN — Medication 125 MILLIGRAM(S): at 23:15

## 2020-01-01 RX ADMIN — CARVEDILOL PHOSPHATE 6.25 MILLIGRAM(S): 80 CAPSULE, EXTENDED RELEASE ORAL at 17:26

## 2020-01-01 RX ADMIN — Medication 1 TABLET(S): at 17:49

## 2020-01-01 RX ADMIN — HEPARIN SODIUM 5000 UNIT(S): 5000 INJECTION INTRAVENOUS; SUBCUTANEOUS at 21:54

## 2020-01-01 RX ADMIN — CARVEDILOL PHOSPHATE 6.25 MILLIGRAM(S): 80 CAPSULE, EXTENDED RELEASE ORAL at 05:01

## 2020-01-01 RX ADMIN — Medication 125 MILLIGRAM(S): at 13:16

## 2020-01-01 RX ADMIN — Medication 5 MILLIGRAM(S): at 06:01

## 2020-01-01 RX ADMIN — CEFTRIAXONE 100 MILLIGRAM(S): 500 INJECTION, POWDER, FOR SOLUTION INTRAMUSCULAR; INTRAVENOUS at 15:16

## 2020-01-01 RX ADMIN — HEPARIN SODIUM 5000 UNIT(S): 5000 INJECTION INTRAVENOUS; SUBCUTANEOUS at 05:59

## 2020-01-01 RX ADMIN — Medication 125 MILLIGRAM(S): at 17:06

## 2020-01-01 RX ADMIN — SODIUM CHLORIDE 1000 MILLILITER(S): 9 INJECTION INTRAMUSCULAR; INTRAVENOUS; SUBCUTANEOUS at 16:45

## 2020-01-01 RX ADMIN — Medication 81 MILLIGRAM(S): at 12:36

## 2020-01-01 RX ADMIN — Medication 1 TABLET(S): at 10:47

## 2020-01-01 RX ADMIN — CARVEDILOL PHOSPHATE 6.25 MILLIGRAM(S): 80 CAPSULE, EXTENDED RELEASE ORAL at 05:59

## 2020-01-01 RX ADMIN — Medication 1 SPRAY(S): at 10:48

## 2020-01-01 RX ADMIN — Medication 500 MILLIGRAM(S): at 11:39

## 2020-01-01 RX ADMIN — Medication 204 MICROGRAM(S)/KG/MIN: at 20:54

## 2020-01-01 RX ADMIN — HEPARIN SODIUM 5000 UNIT(S): 5000 INJECTION INTRAVENOUS; SUBCUTANEOUS at 05:10

## 2020-01-01 RX ADMIN — Medication 125 MILLIGRAM(S): at 12:15

## 2020-01-01 RX ADMIN — ATORVASTATIN CALCIUM 40 MILLIGRAM(S): 80 TABLET, FILM COATED ORAL at 00:20

## 2020-01-01 RX ADMIN — HEPARIN SODIUM 5000 UNIT(S): 5000 INJECTION INTRAVENOUS; SUBCUTANEOUS at 18:51

## 2020-01-01 RX ADMIN — HEPARIN SODIUM 5000 UNIT(S): 5000 INJECTION INTRAVENOUS; SUBCUTANEOUS at 11:13

## 2020-01-01 RX ADMIN — Medication 500 MILLIGRAM(S): at 12:30

## 2020-01-01 RX ADMIN — Medication 500 MILLIGRAM(S): at 10:56

## 2020-02-19 NOTE — HISTORY OF PRESENT ILLNESS
[FreeTextEntry8] : This is a 89-year-old female who is brought in by her daughter for persistent cough.\par \par The patient is very pleasant but with significant dementia she offers no complaints\par \par The daughter states that she has had no purulent sputum but that she does not tend to bring sputum up\par \par  no chills fever or rash was noted

## 2020-02-19 NOTE — ASSESSMENT
[FreeTextEntry1] : This is a 89-year-old female brought in by her daughter for persistent cough\par \par The patient has clear lungs normal oropharynx ears  are not inflamed no rash no joint swelling\par \par I see no evidence of any bacterial infection this is upper airway cough probably viral area she will use Flonase and Zyrtec and use a humidifier

## 2020-02-19 NOTE — PHYSICAL EXAM
[No Acute Distress] : no acute distress [Well Nourished] : well nourished [Normal Sclera/Conjunctiva] : normal sclera/conjunctiva [Normal Oropharynx] : the oropharynx was normal [Normal TMs] : both tympanic membranes were normal [Normal Outer Ear/Nose] : the outer ears and nose were normal in appearance [No Lymphadenopathy] : no lymphadenopathy [Normal] : no respiratory distress, lungs were clear to auscultation bilaterally and no accessory muscle use [No Joint Swelling] : no joint swelling [No Rash] : no rash

## 2020-03-10 NOTE — PATIENT PROFILE ADULT - FALL HARM RISK CONCLUSION
Fall with Harm Risk

## 2020-04-02 PROBLEM — Z79.2 LONG TERM CURRENT USE OF ANTIBIOTICS: Status: ACTIVE | Noted: 2019-01-01

## 2020-05-14 NOTE — ED ADULT NURSE REASSESSMENT NOTE - NS ED NURSE REASSESS COMMENT FT1
Straight cath performed. 2 RNs at bedside, pt tolerated well. 300mLs of urine obtained. Pt cleaned, resting comfortably, will continue to monitor.

## 2020-05-14 NOTE — H&P ADULT - NSHPPHYSICALEXAM_GEN_ALL_CORE
T(C): 36.5 (05-14-20 @ 21:16), Max: 36.9 (05-14-20 @ 16:11)  HR: 84 (05-14-20 @ 21:16) (54 - 90)  BP: 109/55 (05-14-20 @ 21:16) (80/50 - 113/53)  RR: 20 (05-14-20 @ 21:16) (18 - 24)  SpO2: 100% (05-14-20 @ 21:16) (96% - 100%)  PHYSICAL EXAM:  GENERAL: NAD, well-developed  HEAD:  Atraumatic, Normocephalic  EYES: EOMI, PERRLA, conjunctiva and sclera clear  NECK: Supple, No JVD  CHEST/LUNG: Clear to auscultation bilaterally; No wheeze  HEART: Regular rate and rhythm; No murmurs, rubs, or gallops  ABDOMEN: Soft, Nontender, Nondistended; Bowel sounds present  EXTREMITIES:, No clubbing, cyanosis, or edema  PSYCH: AAOx3  NEUROLOGY: non-focal  SKIN: No rashes or lesions T(C): 36.5 (05-14-20 @ 21:16), Max: 36.9 (05-14-20 @ 16:11)  HR: 84 (05-14-20 @ 21:16) (54 - 90)  BP: 109/55 (05-14-20 @ 21:16) (80/50 - 113/53)  RR: 20 (05-14-20 @ 21:16) (18 - 24)  SpO2: 100% (05-14-20 @ 21:16) (96% - 100%)  PHYSICAL EXAM:  GENERAL: thin,   HEAD:  Atraumatic, Normocephalic  EYES: EOMI, PERRLA, conjunctiva and sclera clear  NECK: Supple, No JVD  CHEST/LUNG: Clear to auscultation bilaterally; No wheeze  HEART: Regular rate and rhythm; No murmurs, rubs, or gallops  ABDOMEN: Soft, Nontender, Nondistended; Bowel sounds present  EXTREMITIES:, No clubbing, cyanosis, or edema  PSYCH: AAOx3  NEUROLOGY: non-focal  SKIN: multiple un-stagable ulcers without pustulation or erythema, right heel ulcer with darkened hue T(C): 36.5 (05-14-20 @ 21:16), Max: 36.9 (05-14-20 @ 16:11)  HR: 84 (05-14-20 @ 21:16) (54 - 90)  BP: 109/55 (05-14-20 @ 21:16) (80/50 - 113/53)  RR: 20 (05-14-20 @ 21:16) (18 - 24)  SpO2: 100% (05-14-20 @ 21:16) (96% - 100%)  PHYSICAL EXAM:  GENERAL: thin, non cachetic resting comfortably   HEAD:  Atraumatic, Normocephalic  EYES: EOMI, PERRLA, conjunctiva and sclera clear  NECK: Supple, No JVD  CHEST/LUNG: Clear to auscultation bilaterally; No wheeze  HEART: Regular rate and rhythm; No murmurs, rubs, or gallops  ABDOMEN: Soft, Nontender, Nondistended; Bowel sounds present  EXTREMITIES:, No clubbing, cyanosis, or edema  PSYCH: AAOx3, normal affect  NEUROLOGY: non-focal, CN II - XII in tact  SKIN: multiple un-stagable ulcers without pustulation or erythema, right heel ulcer with darkened hue

## 2020-05-14 NOTE — H&P ADULT - PROBLEM/PLAN-4
Xavi Cota MD;  Location: [facility];  DISPLAY PLAN FREE TEXT

## 2020-05-14 NOTE — H&P ADULT - NSICDXFAMILYHX_GEN_ALL_CORE_FT
FAMILY HISTORY:  No pertinent family history in first degree relatives, unable to obtain full fam hx from pt 2/2 dementia FAMILY HISTORY:  FH: multiple sclerosis, mom  FH: myocardial infarction, father  FH: rheumatoid arthritis, daughter

## 2020-05-14 NOTE — H&P ADULT - PROBLEM SELECTOR PROBLEM 6
Atherosclerotic cardiovascular disease Hyperlipidemia CAD (coronary artery disease) Failure to thrive in adult

## 2020-05-14 NOTE — H&P ADULT - PROBLEM SELECTOR PLAN 3
Ct abdomen and pelvis: Interval increase in size of a now 7.2 cm right upper lobe pulmonary mass, previously 4.1 cm, with new narrowing of a right upper lobe airway, highly suspicious for primary lung neoplasm.  Consider Oncology consult in the AM, will likely need IR guided bx for histology creatinine 2.06 ( from baseline .96)  Likely prerenal in setting of hypovolemia and infection   monitor BMP daily , renally dose all meds avoid nephrotoxic agents, monitor I's and O's   Indwelling kaur, trial of void during day - treat with ceftriaxone  f/u ur cx

## 2020-05-14 NOTE — ED PROVIDER NOTE - CRITICAL CARE PROVIDED
telephone consultation with the patient's family/additional history taking/interpretation of diagnostic studies/documentation/conducted a detailed discussion of DNR status/direct patient care (not related to procedure)/consult w/ pt's family directly relating to pts condition

## 2020-05-14 NOTE — ED PROVIDER NOTE - PHYSICAL EXAMINATION
Gen: emaciated, hypotensive to 90/40, HR 30-50, afebrile   HEENT: EOMI, no nasal discharge, mucous membranes moist  CV: sinus tatianna, no M/R/G  Resp: CTAB, no W/R/R  GI: Abdomen soft non-distended, NTTP, no masses  MSK:  no bruising, no LE edema, multiple unstageable decub ulcers w/o pustulation or localized erythema  Neuro: A&Ox1,moving all four extremities spontaneously

## 2020-05-14 NOTE — H&P ADULT - NSICDXPASTMEDICALHX_GEN_ALL_CORE_FT
PAST MEDICAL HISTORY:  Atherosclerotic cardiovascular disease     Hyperlipidemia     Hypertension     Spinal stenosis PAST MEDICAL HISTORY:  Atherosclerotic cardiovascular disease Stent 10 years ago    Hyperlipidemia     Hypertension     Spinal stenosis

## 2020-05-14 NOTE — H&P ADULT - NSHPREVIEWOFSYSTEMS_GEN_ALL_CORE
REVIEW OF SYSTEMS:  CONSTITUTIONAL: No weakness, fevers or chills  EYES/ENT: No visual changes;  No vertigo or throat pain   NECK: No pain or stiffness  RESPIRATORY: No cough, wheezing, hemoptysis; No shortness of breath  CARDIOVASCULAR: No chest pain or palpitations  GASTROINTESTINAL: No abdominal or epigastric pain. No nausea, vomiting, or hematemesis; No diarrhea or constipation. No melena or hematochezia.  GENITOURINARY: No dysuria, frequency or hematuria  NEUROLOGICAL: No numbness or weakness  SKIN: No itching, burning, rashes, or lesions   All other review of systems is negative unless indicated above. REVIEW OF SYSTEMS:  CONSTITUTIONAL: No weakness, fevers or chills  EYES/ENT: No visual changes;  No vertigo or throat pain   NECK: No pain or stiffness  RESPIRATORY: No cough, wheezing, hemoptysis; No shortness of breath  CARDIOVASCULAR: No chest pain or palpitations  GASTROINTESTINAL: Denies abdominal pain, melena  + diarrhea    GENITOURINARY: No dysuria, frequency or hematuria  NEUROLOGICAL: + weakness   SKIN: No itching, burning, rashes, or lesions   All other review of systems is negative unless indicated above. REVIEW OF SYSTEMS:  CONSTITUTIONAL: +weakness  EYES/ENT: No visual changes;  No vertigo or throat pain   NECK: No pain or stiffness  RESPIRATORY: No cough, wheezing, hemoptysis; No shortness of breath  CARDIOVASCULAR: No chest pain or palpitations  GASTROINTESTINAL: Denies abdominal pain, melena  + diarrhea    GENITOURINARY: No dysuria, frequency or hematuria  NEUROLOGICAL: + weakness   SKIN: No itching, burning, rashes, or lesions   All other review of systems is negative unless indicated above. REVIEW OF SYSTEMS:  CONSTITUTIONAL: +weakness, no fevers   EYES/ENT: No visual changes;  No vertigo or throat pain   NECK: No pain or stiffness  RESPIRATORY: No cough, wheezing, hemoptysis; No shortness of breath  CARDIOVASCULAR: No chest pain or palpitations  GASTROINTESTINAL: Denies abdominal pain, melena  + diarrhea    GENITOURINARY: No dysuria, frequency or hematuria  NEUROLOGICAL: + weakness,  no focal deficits   SKIN: No itching, burning, rashes, or lesions   All other review of systems is negative unless indicated above.

## 2020-05-14 NOTE — H&P ADULT - HISTORY OF PRESENT ILLNESS
89F w/ PMH htn, hld, spinal stenosis, MI, left hip replacement, recent sacral decub ulcers  p/w failure to thrive. HPI per daughter on phone. States over past two weeks pt w/ very minimal PO intake, refusing food bc of constant watery NB diarrhea. Has been drinking pedialyte only. Pt also very weak, not ambulating, no falls. No fevers, cough, CP, SOB, no abd pain or urinary sxs. Today home health nurse changing sacral wound bandages noted pt to be hypotensive, malnourished-appearing, weak. Pt has been taking all medications as indicated (including torsemide for BP).    VS in the ED: 89F w/ PMH htn, hld, spinal stenosis, MI, left hip replacement, recent sacral decub ulcers  p/w failure to thrive. HPI per daughter on phone. States over past two weeks pt w/ very minimal PO intake, refusing food bc of constant watery NB diarrhea. Has been drinking pedialyte only. Pt also very weak, not ambulating, no falls. No fevers, cough, CP, SOB, no abd pain or urinary sxs. Today home health nurse changing sacral wound bandages noted pt to be hypotensive, malnourished-appearing, weak. Pt has been taking all medications as indicated (including torsemide for BP).      BP in the ED: 8/50, HR 54, RR24, T 98.5F, saturating 100% on NC 89F w/ PMH htn, hld, spinal stenosis, MI, left hip replacement, recent sacral decub ulcers  p/w failure to thrive and profuse diarrhea X 4 weeks .Patient has had watery stools for four weeks. 7 watery stools a day.  Patient takes 1 capsule of keflex daily ( on this regimen for greater 1 year )  to assist with treatment of infection on her hip. Patient began having diarrhea was self treated at home with imodium. Patient trialed peptobismol and taking pedialyte at home 1 .5L at day with persistant diarrhea. Was causing the patient to have sores. Dr. Eddy Roa, pcp, sent in a visiting nurse who was trying to assist with wound care . Patient was given BRAT diet, with decreased po intake. Patient has had a change in her mental status, while she has mild dementia AoX2 at baseline. Able to talk, with poor hearing ( does not have hearing aids with her). Complaints of significant weakness, patient currently not ambulating, no falls. No fevers, cough, CP, SOB, no abd pain or urinary sxs.  previously used walker for ambulation. Unable to do so now. No fevers, chills, denies nights sweats, denies  CP , SOB, melena. Aid accompanies her in her home 12 hours every day and another nurse comes on weekend. Today home health nurse changing sacral wound bandages patient found to have been hypotensive, malnourished-appearing, weak. Pt has been taking all medications as indicated (including torsemide for BP).    VS in the ED: BP 80/50, HR 54, RR24, T 98.5 F saturating 100 % on NC 89F w/ PMH htn, hld, spinal stenosis, MI, left hip replacement, dementia recent sacral decub ulcers  p/w failure to thrive and profuse diarrhea X 4 weeks .Patient has had watery stools for four weeks. 7 watery stools a day.  Patient takes 1 capsule of keflex daily ( on this regimen for greater 1 year )  to assist with treatment of infection on her hip. Patient began having diarrhea was self treated at home with imodium. Patient trialed peptobismol and taking pedialyte at home 1 .5L at day with persistent diarrhea. Was causing the patient to have sores. Dr. Eddy Roa, pcp, sent in a visiting nurse who was trying to assist with wound care . Patient was given BRAT diet, with decreased po intake. Patient has had a change in her mental status, while she has mild dementia AoX2 at baseline. Able to talk, with poor hearing ( does not have hearing aids with her). Complaints of significant weakness, patient currently not ambulating, no falls. No fevers, cough, CP, SOB, no abd pain or urinary sxs.  previously used walker for ambulation. Unable to do so now. No fevers, chills, denies nights sweats, denies  CP , SOB, melena. Aid accompanies her in her home 12 hours every day and another nurse comes on weekend. Today home health nurse changing sacral wound bandages patient found to have been hypotensive, malnourished-appearing, weak. Pt has been taking all medications as indicated (including torsemide for BP).    VS in the ED: BP 80/50, HR 54, RR24, T 98.5 F saturating 100 % on NC 89F w/ PMH htn, hld, spinal stenosis, MI, left hip replacement on chronic oral keflex, dementia, recent sacral decub ulcers p/w failure to thrive and profuse diarrhea X 4 weeks .Patient has had watery stools for four weeks. 7 watery stools a day.  Patient takes 1 capsule of keflex daily ( on this regimen for greater 1 year )  to assist with treatment of infection on her hip. Patient began having diarrhea was self treated at home with imodium. Patient trialed peptobismol and taking pedialyte at home 1 .5L at day with persistent diarrhea. Was causing the patient to have sores. Dr. Eddy Roa, pcp, sent in a visiting nurse who was trying to assist with wound care . Patient was given BRAT diet, with decreased po intake. Patient has had a change in her mental status, while she has mild dementia AoX2 at baseline. Able to talk, with poor hearing ( does not have hearing aids with her). Complaints of significant weakness, patient currently not ambulating, no falls. No fevers, cough, CP, SOB, no abd pain or urinary sxs.  previously used walker for ambulation. Unable to do so now. No fevers, chills, denies nights sweats, denies  CP , SOB, melena. Aid accompanies her in her home 12 hours every day and another nurse comes on weekend. Today home health nurse changing sacral wound bandages patient found to have been hypotensive, malnourished-appearing, weak. Pt has been taking all medications as indicated (including torsemide for BP).    VS in the ED: BP 80/50, HR 54, RR24, T 98.5 F saturating 100 % on NC

## 2020-05-14 NOTE — ED PROVIDER NOTE - OBJECTIVE STATEMENT
89F w/ PMH htn, hld, spinal stenosis, MI, left hip replacement, recent sacral decub ulcers  p/w failure to thrive. HPI per daughter on phone. States over past two weeks pt w/ very minimal PO intake, refusing food bc of constant watery NB diarrhea. Has been drinking pedialyte only. Pt also very weak, not ambulating, no falls. No fevers, cough, CP, SOB, no abd pain or urinary sxs. Today home health nurse changing sacral wound bandages noted pt to be hypotensive, malnourished-appearing, weak. Pt has been taking all medications as indicated (including torsemide for BP).

## 2020-05-14 NOTE — H&P ADULT - NSHPLABSRESULTS_GEN_ALL_CORE
8.9    13.56 )-----------( [id number]      ( 14 May 2020 17:02 )             30.6   05-14    137  |  99  |  39<H>  ----------------------------<  111<H>  4.6   |  26  |  2.06<H>    Ca    9.3      14 May 2020 17:02  Phos  3.3     05-14  Mg     2.0     05-14    TPro  5.6<L>  /  Alb  2.3<L>  /  TBili  0.4  /  DBili  x   /  AST  28  /  ALT  18  /  AlkPhos  129<H>  05-14 Personally reviewed imaging, labs    8.9    13.56 )-----------( 419      ( 14 May 2020 17:02 )             30.6   05-14    137  |  99  |  39<H>  ----------------------------<  111<H>  4.6   |  26  |  2.06<H>    Ca    9.3      14 May 2020 17:02  Phos  3.3     05-14  Mg     2.0     05-14    TPro  5.6<L>  /  Alb  2.3<L>  /  TBili  0.4  /  DBili  x   /  AST  28  /  ALT  18  /  AlkPhos  129<H>  05-14

## 2020-05-14 NOTE — ED PROVIDER NOTE - CLINICAL SUMMARY MEDICAL DECISION MAKING FREE TEXT BOX
89F s/p two weeks of minimal PO intake, diarrhea, presenting hypotensive, bradycardic, concern for dehydration in setting of failure to thrive, poss infection will check urine, labs, imaging to r/o PNA, further imaging as indicated, fluid resuscitation.

## 2020-05-14 NOTE — ED PROVIDER NOTE - DISPOSITION TYPE
Called and gave results per Awilda. Pt had a clear understanding and had no further questions.   ADMIT

## 2020-05-14 NOTE — ED PROVIDER NOTE - CARE PLAN
Principal Discharge DX:	Failure to thrive in adult Principal Discharge DX:	Failure to thrive in adult  Goal:	Pulmonary mass 4.1cm to 7.2cm  Secondary Diagnosis:	C. difficile diarrhea  Secondary Diagnosis:	Pulmonary mass

## 2020-05-14 NOTE — ED PROVIDER NOTE - PROGRESS NOTE DETAILS
Osiel Bernabe D.O., PGY1: ED Provider Sepsis Reassessment Note:   VS: 105/84 BP, 61 HR, 22 RR, 97.7F T, 96% RA POx.  Patient evaluated after fluid administration:  -LUNGS: CTA b/l; CV: RRR, DP pulse 2+ b/l; SKIN- Cap. refill < 2s; EXTREMITIES: LE warm, no edema  Patient responding well to resuscitation.

## 2020-05-14 NOTE — H&P ADULT - PROBLEM SELECTOR PLAN 6
c/w home atorvastatin Elevated troponin likely in setting of demand ischemia   Will recheck trop on next set of labs  EKG with sinus arrythmia with anterior q waves will compare with prior EKG Severe protein/ caloric malnutrition  Nutrition consult   PT eval

## 2020-05-14 NOTE — ED PROVIDER NOTE - ATTENDING CONTRIBUTION TO CARE
attending Rubi: 89yF h/o HTN, HLD, spinal stenosis, MI, L hip replacement, sacral decub ulcers BIBEMS from home with dehydration, diarrhea and FTT. As per EMS with several weeks of diarrhea. Now with minimal PO intake. Hypotensive and bradycardic on arrival, hard of hearing, oriented x 2, dry MM. +recent keflex. Will place on isolation precautions for cdiff, labs, IVF, ekg, admit. Will discuss advanced directives with family over the phone

## 2020-05-14 NOTE — H&P ADULT - PROBLEM SELECTOR PLAN 2
Patient with profuse diarrhea 7 X daily of watery stool in the setting of daily keflex  C diff Positive, will start vancomycin po 125 mg q6 X 10 days  CT abdomen and pelvis: Proctocolitis. Differential includes infectious, inflammatory or ischemic etiology. Small bilateral pleural effusions.  Will continue with maintenance fluids for hydration X 10 hours  will hold home pantoprazole  given active c difficile infection and home donepezil given side effects of diarrhea  Stool count severe sepsi, c/b hypotension. also tachypnea and leukocytosis  Etiology mostly likely from c diff, however with positive UA for LE , WBC > 50 , nitrites negative, Patient additionally has notable decub ulcer and right heel ulcer as well which needs attending . Patient with pancolitis on Ct abdomen and pelvis   f/u urine culture, f/u blood cultures   Start Po Vancomycin 125 q6 X 10 days, patient currently  Will treat UTI with ceftriaxone   Will continue with NS 70 CC / hour X 10 hours , given patient severely hypovolemic  Wound care  hold antihypertensives  Vitals q4  ID consult in AM

## 2020-05-14 NOTE — H&P ADULT - PROBLEM SELECTOR PLAN 4
· Rate controlled on admission, INR therapeutic    · Continue Metoprolol   · Continue Coumadin Severe protein/ caloric malnutrition  Nutrition consult   PT eval CT abdomen and pelvis: Interval increase in size of a now 7.2 cm right upper lobe pulmonary mass, previously 4.1 cm, with new narrowing of a right upper lobe airway, highly suspicious for primary lung neoplasm.  Will discuss with family if they want to work up neoplasm prior to oncology consult   Consider Oncology consult in the AM, will likely need IR guided bx for histology CT abdomen and pelvis: Interval increase in size of a now 7.2 cm right upper lobe pulmonary mass, previously 4.1 cm, with new narrowing of a right upper lobe airway, highly suspicious for primary lung neoplasm.  Family would like  to work up neoplasm, aware of previous suspicious lesion prior to admission creatinine 2.06 ( from baseline .96)  Likely prerenal in setting of hypovolemia and infection   monitor BMP daily , renally dose all meds avoid nephrotoxic agents, monitor I's and O's   Indwelling kaur by ED, trial of void during day

## 2020-05-14 NOTE — H&P ADULT - PROBLEM SELECTOR PLAN 1
Meets criteria for tachypnea and leukocytosis  Etiology mostly likely from c diff, however with positive UA for LE , WBC > 50 , nitrites negative, Patient additionally has notable decub ulcer and right heel ulcer as well which needs attending   f/u urine culture, f/u blood cultures   Start Po Vancomycin 125 q6 X 10 days, patient currently  Will continue with NS 70 CC / hour X 10 hours , given patient severe hypovolemic  Wound care  hold antihypertensives  Vitals q4 Meets criteria for tachypnea and leukocytosis  Etiology mostly likely from c diff, however with positive UA for LE , WBC > 50 , nitrites negative, Patient additionally has notable decub ulcer and right heel ulcer as well which needs attending . Patient also noted to have proctitis on Ct abdomen and pelvis   f/u urine culture, f/u blood cultures   Start Po Vancomycin 125 q6 X 10 days, patient currently  Will treat UTI with zosyn and will de-escalate once , will renally dose   Will continue with NS 70 CC / hour X 10 hours , given patient severely hypovolemic  Wound care  hold antihypertensives  Vitals q4 Meets criteria for tachypnea and leukocytosis  Etiology mostly likely from c diff, however with positive UA for LE , WBC > 50 , nitrites negative, Patient additionally has notable decub ulcer and right heel ulcer as well which needs attending . Patient also noted to have proctitis on Ct abdomen and pelvis   f/u urine culture, f/u blood cultures   Start Po Vancomycin 125 q6 X 10 days, patient currently  Will treat UTI/ proctitis empirically with Cipro Flagyl   Will continue with NS 70 CC / hour X 10 hours , given patient severely hypovolemic  Wound care  hold antihypertensives  Vitals q4 Meets criteria for tachypnea and leukocytosis  Etiology mostly likely from c diff, however with positive UA for LE , WBC > 50 , nitrites negative, Patient additionally has notable decub ulcer and right heel ulcer as well which needs attending . Patient also noted to have proctitis on Ct abdomen and pelvis   f/u urine culture, f/u blood cultures   Start Po Vancomycin 125 q6 X 10 days, patient currently  Will treat UTI with ceftriaxone   Will continue with NS 70 CC / hour X 10 hours , given patient severely hypovolemic  Wound care  hold antihypertensives  Vitals q4 - c/b pancolitis  Patient with profuse diarrhea 7 X daily of watery stool in the setting of daily keflex  C diff Positive, will start vancomycin po 125 mg q6 X 10 days  CT abdomen and pelvis: Proctocolitis. Differential includes infectious, inflammatory or ischemic etiology. Small bilateral pleural effusions.  Will continue with maintenance fluids for hydration X 10 hours  will hold home pantoprazole  given active c difficile infection and home donepezil given side effects of diarrhea  Stool count

## 2020-05-14 NOTE — H&P ADULT - ASSESSMENT
89F w/ PMH htn, hld, spinal stenosis, MI, left hip replacement, recent sacral decub ulcers  p/w failure to thrive and profuse diarrhea X 4 weeks admitted with sepsis likely 2/2 to c difficile 89F w/ PMH htn, hld, spinal stenosis, MI, left hip replacement, recent sacral decub ulcers p/w failure to thrive and profuse diarrhea X 4 weeks admitted with sepsis likely 2/2 to c difficile

## 2020-05-14 NOTE — H&P ADULT - PROBLEM SELECTOR PLAN 7
DVT PPX: heparin sub q   Diet: Diet/ TLC  Dispo: will hold antihypertensives in the setting of hypotension including amlodipine torsemide Elevated troponin likely in setting of demand ischemia   Will recheck trop on next set of labs  EKG with sinus arrythmia with anterior q waves will compare with prior EKG

## 2020-05-14 NOTE — H&P ADULT - PROBLEM SELECTOR PLAN 8
Transitions of Care Status:  1.  Name of PCP:  2.  PCP Contacted on Admission: [ ] Y    [ ] N    3.  PCP contacted at Discharge: [ ] Y    [ ] N    [ ] N/A  4.  Post-Discharge Appointment Date and Location:  5.  Summary of Handoff given to PCP: c/w home atorvastatin will hold antihypertensives in the setting of hypotension including amlodipine torsemide

## 2020-05-14 NOTE — ED ADULT NURSE REASSESSMENT NOTE - NS ED NURSE REASSESS COMMENT FT1
Negro placed, 2 RNs at bedside maintaining sterile technique. 175 mLs of urine maintained. Pt cleaned, turned and positioned. Will continue to monitor.

## 2020-05-14 NOTE — ED ADULT NURSE NOTE - OBJECTIVE STATEMENT
The pt is a 89 y/o F PMH nausea, weakness, diarrhea , decreased PO intake for unknown amount of time. Patient coming from home BIBEMS, as per EMS, pt has not been eating, drinking and has been experiencing diarrhea. Upon assessment, pt is AO x 3, but forgetful, speaking in full sentences, abd soft and nontender, unable to ambulate/bear weight. Pt has unstageable pressure ulcers to medial sacrum. bilateral heels (right heel open), unstageable left heel. Pt 5L on NC, on tele, safety precautions in place, comfort measures provided, will continue to monitor. The pt is a 89 y/o F PMH nausea, weakness, diarrhea , decreased PO intake for unknown amount of time. Patient coming from home BIBEMS, as per EMS, pt has not been eating, drinking and has been experiencing diarrhea. Upon assessment, pt is AO x1-2, but forgetful, speaking in full sentences, abd soft and nontender, unable to ambulate/bear weight. Pt has unstageable pressure ulcers to medial sacrum and left hip, DTI to bilateral heels (right heel open). Allevyn placed to left hip and sacrum. Per EMS patient was found to be 90% on RA, and was placed on 5L NC and improved to high 90s. Pt. tolerating NC well, oxygen saturation high 90s on 5L NC. Pt. on cardiac monitor. safety precautions in place, comfort measures provided, will continue to monitor.

## 2020-05-14 NOTE — H&P ADULT - PROBLEM SELECTOR PLAN 5
will hold antihypertensives in the setting of hypotension Severe protein/ caloric malnutrition  Nutrition consult   PT eval CT abdomen and pelvis: Interval increase in size of a now 7.2 cm right upper lobe pulmonary mass, previously 4.1 cm, with new narrowing of a right upper lobe airway, highly suspicious for primary lung neoplasm.  Family does not want to work up neoplasm, aware of previous suspicious lesion prior to admission

## 2020-05-15 NOTE — GOALS OF CARE CONVERSATION - ADVANCED CARE PLANNING - CONVERSATION DETAILS
Spoke at length with patient's daughter Manuela De Luna who has paperwork at home for Advanced Directive. She stated that per her mother's wishes, she would not like to have a feeding tube placed. Additionally, CPR and DNR was explained to Ms. De Luna who stated that given her mother's age and frailty that she would not want her to undergo a traumatic experience such as chest compressions. Similarly, she would not want her mother to be intubated given current suspicion of lung malignancy, age, and comorbidities. Patient's daughter agreed to make patient DNR/DNI. MOLST to be filled out and placed in patient chart.

## 2020-05-15 NOTE — PROGRESS NOTE ADULT - PROBLEM SELECTOR PLAN 9
DVT PPX: heparin sub q   Diet: Diet/ TLC  Dispo: DVT PPX: heparin sub q   Diet: Diet/ TLC w/ ensure  Dispo: pending improvement in clinical status  Code Status: DNR/DNI, no feeding tube, MOLST filled out

## 2020-05-15 NOTE — PHYSICAL THERAPY INITIAL EVALUATION ADULT - PERTINENT HX OF CURRENT PROBLEM, REHAB EVAL
89F w/ PMH htn, hld, spinal stenosis, MI, left hip replacement, recent sacral decub ulcers p/w failure to thrive and profuse diarrhea X 4 weeks admitted with sepsis likely 2/2 to c difficile

## 2020-05-15 NOTE — PHYSICAL THERAPY INITIAL EVALUATION ADULT - DISCHARGE DISPOSITION, PT EVAL
rehabilitation facility/Subacute rehab, If pt d/c home would require home PT, assist with all mobility, & DME: RW, mechanical pt lift device (alberto), transport w/c, & 3:1 commode.

## 2020-05-15 NOTE — PROGRESS NOTE ADULT - PROBLEM SELECTOR PLAN 2
severe sepsi, c/b hypotension. also tachypnea and leukocytosis  Etiology mostly likely from c diff, however with positive UA for LE , WBC > 50 , nitrites negative, Patient additionally has notable decub ulcer and right heel ulcer as well which needs attending . Patient with pancolitis on Ct abdomen and pelvis   f/u urine culture, f/u blood cultures   Start Po Vancomycin 125 q6 X 10 days, patient currently  Will treat UTI with ceftriaxone   Will continue with NS 70 CC / hour X 10 hours , given patient severely hypovolemic  Wound care  hold antihypertensives  Vitals q4  ID consult in AM severe sepsi, c/b hypotension. also tachypnea and leukocytosis  Etiology mostly likely from c diff, however with positive UA for LE , WBC > 50 , nitrites negative, Patient additionally has notable decub ulcer and right heel ulcer as well which needs attending . Patient with pancolitis on Ct abdomen and pelvis   f/u urine culture, f/u blood cultures   c/w Po Vancomycin 125 q6 X 10 days, CTX for UTI   Will continue with NS 50 CC / hour X 24 hours , given patient severely hypovolemic, slowly improving  Wound care  hold antihypertensives  Vitals q4

## 2020-05-15 NOTE — CONSULT NOTE ADULT - ASSESSMENT
Impression:    Sacral stage 4 pressure ulcer  Left trochanter stage 4 pressure ulcer  Incontinence of bowel and bladder  Incontinence dermatitis  Bilateral heel deep tissue injuries in evolution    Recommend:   1.) topical therapy: sacral, Left trochanter, bilateral heel wounds- cleanse with NS, pat dry, apply cavilon skin protectant daily  2.) Maintain on an alternating air with low air loss surface until envella air fluidized surface available  3.) turn and reposition q 2 hours  4.) nutrition optimization  5.) incontinence management - incontinence cleanser, pads, pericare BID and PRN for soilage  6.) Offload heels/feet with z-flex air fluidized boots  7.) If patient is discharged to private home she will require a semi-electric hospital bed with a low air loss surface. She is grossly incontinent of stool and urine and has multiple trunk stage 4 pressure ulcers. As such, she requires frequent and immediate turning and positioning and incontinence and wound care. Please arrange prior to discharge.    Care as per medicine. Will follow with you.  Upon [facility] [address] [place] [address] [phone]  Seen with Dr. Muhammad and discussed with clinical nurse and primary team MD  Thank you for this consult  Chelsey Ochoa, NP-C, CWOCN [id number]

## 2020-05-15 NOTE — PHYSICAL THERAPY INITIAL EVALUATION ADULT - ADDITIONAL COMMENTS
History obtained from daughter Manuela ([phone]). Pt lives with her daughter in a private home, no steps to enter, stair lift to second floor. Pt with handicap bathroom with shower chair. Pt has HHA 12 hours per day, 7 days a week. Pt was ambulatory with RW and assist/"prodding" from HHA/daughter. Pt required assistance with ADLs prior to admission. Pt has been mostly in wheelchair the last few weeks 2/2 diarrhea.

## 2020-05-15 NOTE — PROGRESS NOTE ADULT - SUBJECTIVE AND OBJECTIVE BOX
Dalia Garvin, PGY-1  Internal Medicine  Pager: [phone], J [phone]    PROGRESS NOTE:     Patient is a 89y old  Female who presents with a chief complaint of diarrhea, hypotension (14 May 2020 21:22)      SUBJECTIVE / OVERNIGHT EVENTS: No acute events overnight    ADDITIONAL REVIEW OF SYSTEMS:    MEDICATIONS  (STANDING):  aspirin enteric coated 81 milliGRAM(s) Oral daily  atorvastatin 40 milliGRAM(s) Oral at bedtime  cefTRIAXone   IVPB 1000 milliGRAM(s) IV Intermittent every 24 hours  fluticasone propionate 50 MICROgram(s)/spray Nasal Spray 1 Spray(s) Both Nostrils daily  gabapentin 300 milliGRAM(s) Oral daily  heparin   Injectable 5000 Unit(s) SubCutaneous every 8 hours  multivitamin 1 Tablet(s) Oral daily  sodium chloride 0.9%. 1000 milliLiter(s) (70 mL/Hr) IV Continuous <Continuous>  vancomycin    Solution 125 milliGRAM(s) Oral every 6 hours    MEDICATIONS  (PRN):      CAPILLARY BLOOD GLUCOSE        I&O's Summary    14 May 2020 07:01  -  15 May 2020 07:00  --------------------------------------------------------  IN: 1270 mL / OUT: 600 mL / NET: 670 mL        PHYSICAL EXAM:  Vital Signs Last 24 Hrs  T(C): 36.8 (15 May 2020 08:04), Max: 36.9 (14 May 2020 16:11)  T(F): 98.3 (15 May 2020 08:04), Max: 98.5 (14 May 2020 16:11)  HR: 80 (15 May 2020 08:04) (54 - 102)  BP: 92/63 (15 May 2020 08:04) (80/50 - 122/75)  BP(mean): --  RR: 17 (15 May 2020 08:04) (17 - 24)  SpO2: 100% (15 May 2020 08:04) (96% - 100%)    CONSTITUTIONAL: NAD, well-developed  RESPIRATORY: Normal respiratory effort; lungs are clear to auscultation bilaterally  CARDIOVASCULAR: Regular rate and rhythm, normal S1 and S2, no murmur/rub/gallop; No lower extremity edema; Peripheral pulses are 2+ bilaterally  ABDOMEN: Nontender to palpation, normoactive bowel sounds, no rebound/guarding; No hepatosplenomegaly  MUSCLOSKELETAL: no clubbing or cyanosis of digits; no joint swelling or tenderness to palpation  PSYCH: A+O to person, place, and time; affect appropriate    LABS:                        8.3    17.95 )-----------( 360      ( 15 May 2020 07:06 )             27.7     05-15    138  |  101  |  34<H>  ----------------------------<  100<H>  4.2   |  25  |  1.68<H>    Ca    8.4      15 May 2020 07:05  Phos  2.9     05-15  Mg     1.8     05-15    TPro  4.8<L>  /  Alb  1.7<L>  /  TBili  0.2  /  DBili  x   /  AST  23  /  ALT  15  /  AlkPhos  117  05-15    PT/INR - ( 14 May 2020 17:02 )   PT: 13.4 sec;   INR: 1.16 ratio         PTT - ( 14 May 2020 17:02 )  PTT:27.2 sec  CARDIAC MARKERS ( 14 May 2020 23:59 )  x     / x     / x     / x     / 2.4 ng/mL      Urinalysis Basic - ( 14 May 2020 18:12 )    Color: Yellow / Appearance: Slightly Turbid / S.016 / pH: x  Gluc: x / Ketone: Negative  / Bili: Small / Urobili: Negative   Blood: x / Protein: 30 mg/dL / Nitrite: Negative   Leuk Esterase: Large / RBC: 3 /hpf / WBC >50 /HPF   Sq Epi: x / Non Sq Epi: 0 / Bacteria: Occasional          RADIOLOGY & ADDITIONAL TESTS:  Results Reviewed:   Imaging Personally Reviewed:  Electrocardiogram Personally Reviewed:    COORDINATION OF CARE:  Care Discussed with Consultants/Other Providers [Y/N]:  Prior or Outpatient Records Reviewed [Y/N]: Dlaia Garvin, PGY-1  Internal Medicine  Pager: [phone], [phone]    PROGRESS NOTE:     Patient is a 89y old  Female who presents with a chief complaint of diarrhea, hypotension (14 May 2020 21:22)      SUBJECTIVE / OVERNIGHT EVENTS: No acute events overnight. Patient seen and examined this AM, resting in bed comfortably, hard of hearing. She stated that she was having some abdominal pain but otherwise had no complaints. Denied chest pain, SOB. Patient was seen by wound care this AM, appreciate recs. Patient unaware that she has pulmonary mass--spoke to daughter regarding findings and family previously aware but do not want to pursue any further workup.     ADDITIONAL REVIEW OF SYSTEMS:    MEDICATIONS  (STANDING):  aspirin enteric coated 81 milliGRAM(s) Oral daily  atorvastatin 40 milliGRAM(s) Oral at bedtime  cefTRIAXone   IVPB 1000 milliGRAM(s) IV Intermittent every 24 hours  fluticasone propionate 50 MICROgram(s)/spray Nasal Spray 1 Spray(s) Both Nostrils daily  gabapentin 300 milliGRAM(s) Oral daily  heparin   Injectable 5000 Unit(s) SubCutaneous every 8 hours  multivitamin 1 Tablet(s) Oral daily  sodium chloride 0.9%. 1000 milliLiter(s) (70 mL/Hr) IV Continuous <Continuous>  vancomycin    Solution 125 milliGRAM(s) Oral every 6 hours    MEDICATIONS  (PRN):      CAPILLARY BLOOD GLUCOSE        I&O's Summary    14 May 2020 07:01  -  15 May 2020 07:00  --------------------------------------------------------  IN: 1270 mL / OUT: 600 mL / NET: 670 mL        PHYSICAL EXAM:  Vital Signs Last 24 Hrs  T(C): 36.8 (15 May 2020 08:04), Max: 36.9 (14 May 2020 16:11)  T(F): 98.3 (15 May 2020 08:04), Max: 98.5 (14 May 2020 16:11)  HR: 80 (15 May 2020 08:04) (54 - 102)  BP: 92/63 (15 May 2020 08:04) (80/50 - 122/75)  BP(mean): --  RR: 17 (15 May 2020 08:04) (17 - 24)  SpO2: 100% (15 May 2020 08:04) (96% - 100%)    CONSTITUTIONAL: NAD, well-developed  RESPIRATORY: Normal respiratory effort; lungs are clear to auscultation bilaterally  CARDIOVASCULAR: Regular rate and rhythm, normal S1 and S2, no murmur/rub/gallop; No lower extremity edema; Peripheral pulses are 2+ bilaterally  ABDOMEN: Nontender to palpation, normoactive bowel sounds, no rebound/guarding; No hepatosplenomegaly  MUSCLOSKELETAL: no clubbing or cyanosis of digits; no joint swelling or tenderness to palpation  PSYCH: A+O to person, place, and time; affect appropriate    LABS:                        8.3    17.95 )-----------( 360      ( 15 May 2020 07:06 )             27.7     05-15    138  |  101  |  34<H>  ----------------------------<  100<H>  4.2   |  25  |  1.68<H>    Ca    8.4      15 May 2020 07:05  Phos  2.9     05-15  Mg     1.8     05-15    TPro  4.8<L>  /  Alb  1.7<L>  /  TBili  0.2  /  DBili  x   /  AST  23  /  ALT  15  /  AlkPhos  117  05-15    PT/INR - ( 14 May 2020 17:02 )   PT: 13.4 sec;   INR: 1.16 ratio         PTT - ( 14 May 2020 17:02 )  PTT:27.2 sec  CARDIAC MARKERS ( 14 May 2020 23:59 )  x     / x     / x     / x     / 2.4 ng/mL      Urinalysis Basic - ( 14 May 2020 18:12 )    Color: Yellow / Appearance: Slightly Turbid / S.016 / pH: x  Gluc: x / Ketone: Negative  / Bili: Small / Urobili: Negative   Blood: x / Protein: 30 mg/dL / Nitrite: Negative   Leuk Esterase: Large / RBC: 3 /hpf / WBC >50 /HPF   Sq Epi: x / Non Sq Epi: 0 / Bacteria: Occasional          RADIOLOGY & ADDITIONAL TESTS:  Results Reviewed:   Imaging Personally Reviewed:  Electrocardiogram Personally Reviewed:    COORDINATION OF CARE:  Care Discussed with Consultants/Other Providers [Y/N]:  Prior or Outpatient Records Reviewed [Y/N]: Dalia Garvin, PGY-1  Internal Medicine  Pager: [phone], C [phone]    PROGRESS NOTE:     Patient is a 89y old  Female who presents with a chief complaint of diarrhea, hypotension (14 May 2020 21:22)      SUBJECTIVE / OVERNIGHT EVENTS: No acute events overnight. Patient seen and examined this AM, resting in bed comfortably, hard of hearing. She stated that she was having some abdominal pain but otherwise had no complaints. Denied chest pain, SOB. Patient was seen by wound care this AM, appreciate recs. Patient unaware that she has pulmonary mass--spoke to daughter regarding findings and family previously aware but do not want to pursue any further workup. +diarrhea    ADDITIONAL REVIEW OF SYSTEMS: neg    MEDICATIONS  (STANDING):  aspirin enteric coated 81 milliGRAM(s) Oral daily  atorvastatin 40 milliGRAM(s) Oral at bedtime  cefTRIAXone   IVPB 1000 milliGRAM(s) IV Intermittent every 24 hours  fluticasone propionate 50 MICROgram(s)/spray Nasal Spray 1 Spray(s) Both Nostrils daily  gabapentin 300 milliGRAM(s) Oral daily  heparin   Injectable 5000 Unit(s) SubCutaneous every 8 hours  multivitamin 1 Tablet(s) Oral daily  sodium chloride 0.9%. 1000 milliLiter(s) (70 mL/Hr) IV Continuous <Continuous>  vancomycin    Solution 125 milliGRAM(s) Oral every 6 hours    MEDICATIONS  (PRN):      CAPILLARY BLOOD GLUCOSE        I&O's Summary    14 May 2020 07:01  -  15 May 2020 07:00  --------------------------------------------------------  IN: 1270 mL / OUT: 600 mL / NET: 670 mL        PHYSICAL EXAM:  Vital Signs Last 24 Hrs  T(C): 36.8 (15 May 2020 08:04), Max: 36.9 (14 May 2020 16:11)  T(F): 98.3 (15 May 2020 08:04), Max: 98.5 (14 May 2020 16:11)  HR: 80 (15 May 2020 08:04) (54 - 102)  BP: 92/63 (15 May 2020 08:04) (80/50 - 122/75)  BP(mean): --  RR: 17 (15 May 2020 08:04) (17 - 24)  SpO2: 100% (15 May 2020 08:04) (96% - 100%)    CONSTITUTIONAL: NAD, frail female, resting NC in place  RESPIRATORY: Normal respiratory effort; lungs are clear to auscultation bilaterally  CARDIOVASCULAR: irregular rate/rhythm, No lower extremity edema; Peripheral pulses are 2+ bilaterally  ABDOMEN: mildly tender to palpation, no rebound/guarding  MUSCLOSKELETAL: no clubbing or cyanosis of digits; no joint swelling or tenderness to palpation  PSYCH: A+O to person, affect appropriate    LABS:                        8.3    17.95 )-----------( 360      ( 15 May 2020 07:06 )             27.7     05-15    138  |  101  |  34<H>  ----------------------------<  100<H>  4.2   |  25  |  1.68<H>    Ca    8.4      15 May 2020 07:05  Phos  2.9     05-15  Mg     1.8     05-15    TPro  4.8<L>  /  Alb  1.7<L>  /  TBili  0.2  /  DBili  x   /  AST  23  /  ALT  15  /  AlkPhos  117  05-15    PT/INR - ( 14 May 2020 17:02 )   PT: 13.4 sec;   INR: 1.16 ratio         PTT - ( 14 May 2020 17:02 )  PTT:27.2 sec  CARDIAC MARKERS ( 14 May 2020 23:59 )  x     / x     / x     / x     / 2.4 ng/mL      Urinalysis Basic - ( 14 May 2020 18:12 )    Color: Yellow / Appearance: Slightly Turbid / S.016 / pH: x  Gluc: x / Ketone: Negative  / Bili: Small / Urobili: Negative   Blood: x / Protein: 30 mg/dL / Nitrite: Negative   Leuk Esterase: Large / RBC: 3 /hpf / WBC >50 /HPF   Sq Epi: x / Non Sq Epi: 0 / Bacteria: Occasional          RADIOLOGY & ADDITIONAL TESTS:  Results Reviewed:   Imaging Personally Reviewed:  Electrocardiogram Personally Reviewed:    COORDINATION OF CARE:  Care Discussed with Consultants/Other Providers [Y/N]:  Prior or Outpatient Records Reviewed [Y/N]:

## 2020-05-15 NOTE — PROGRESS NOTE ADULT - PROBLEM SELECTOR PLAN 5
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  Family does not want to work up neoplasm, aware of previous suspicious lesion prior to admission CT abdomen and pelvis: Interval increase in size of a now 7.2 cm right upper lobe pulmonary mass, previously 4.1 cm, with new narrowing of a right upper lobe airway, highly suspicious for primary lung neoplasm.  Family does not want to work up neoplasm, aware of previous suspicious lesion prior to admission, confirmed w/ family on 5/15; family also does not want patient to be told about pulm mass

## 2020-05-15 NOTE — PHYSICAL THERAPY INITIAL EVALUATION ADULT - RISK REDUCTION/PREVENTION, PT EVAL
risk factors H Plasty Text: Given the location of the defect, shape of the defect and the proximity to free margins a H-plasty was deemed most appropriate for repair.  Using a sterile surgical marker, the appropriate advancement arms of the H-plasty were drawn incorporating the defect and placing the expected incisions within the relaxed skin tension lines where possible. The area thus outlined was incised deep to adipose tissue with a #15 scalpel blade. The skin margins were undermined to an appropriate distance in all directions utilizing iris scissors.  The opposing advancement arms were then advanced into place in opposite direction and anchored with interrupted buried subcutaneous sutures.

## 2020-05-15 NOTE — PROGRESS NOTE ADULT - ASSESSMENT
89F w/ PMH htn, hld, spinal stenosis, MI, left hip replacement, recent sacral decub ulcers p/w failure to thrive and profuse diarrhea X 4 weeks admitted with sepsis likely 2/2 to c difficile 89F w/ PMH htn, hld, spinal stenosis, MI, left hip replacement, recent sacral decub ulcers p/w failure to thrive and profuse diarrhea X 4 weeks admitted with sepsis 2/2 to c difficile vs ulcers vs UTI

## 2020-05-15 NOTE — PROGRESS NOTE ADULT - PROBLEM SELECTOR PLAN 1
- c/b pancolitis  Patient with profuse diarrhea 7 X daily of watery stool in the setting of daily keflex  C diff Positive, will start vancomycin po 125 mg q6 X 10 days  CT abdomen and pelvis: Proctocolitis. Differential includes infectious, inflammatory or ischemic etiology. Small bilateral pleural effusions.  Will continue with maintenance fluids for hydration X 10 hours  will hold home pantoprazole  given active c difficile infection and home donepezil given side effects of diarrhea  Stool count - c/b pancolitis  Patient with profuse diarrhea 7 X daily of watery stool in the setting of daily keflex  C diff Positive, wc/w vancomycin po 125 mg q6 X 10 days  CT abdomen and pelvis: Proctocolitis. Differential includes infectious, inflammatory or ischemic etiology. Small bilateral pleural effusions.  Will continue with gentle maintenance fluids for hydration X 24 hours  will hold home pantoprazole  given active c difficile infection and home donepezil given side effects of diarrhea  Stool count

## 2020-05-15 NOTE — PROGRESS NOTE ADULT - PROBLEM SELECTOR PLAN 7
Elevated troponin likely in setting of demand ischemia   Will recheck trop on next set of labs  EKG with sinus arrythmia with anterior q waves will compare with prior EKG Elevated troponin likely in setting of demand ischemia, downtrending on repeat  -no need to trend further  EKG with sinus arrythmia with anterior q waves will compare with prior EKG, continue to monitor

## 2020-05-15 NOTE — CONSULT NOTE ADULT - SUBJECTIVE AND OBJECTIVE BOX
Wound Surgery Consult Note:    HPI:  89F w/ PMH htn, hld, spinal stenosis, MI, left hip replacement on chronic oral keflex, dementia, recent sacral decub ulcers p/w failure to thrive and profuse diarrhea X 4 weeks .Patient has had watery stools for four weeks. 7 watery stools a day.  Patient takes 1 capsule of keflex daily ( on this regimen for greater 1 year )  to assist with treatment of infection on her hip. Patient began having diarrhea was self treated at home with imodium. Patient trialed peptobismol and taking pedialyte at home 1 .5L at day with persistent diarrhea. Was causing the patient to have sores. Dr. Eddy Roa, pcp, sent in a visiting nurse who was trying to assist with wound care . Patient was given BRAT diet, with decreased po intake. Patient has had a change in her mental status, while she has mild dementia AoX2 at baseline. Able to talk, with poor hearing ( does not have hearing aids with her). Complaints of significant weakness, patient currently not ambulating, no falls. No fevers, cough, CP, SOB, no abd pain or urinary sxs.  previously used walker for ambulation. Unable to do so now. No fevers, chills, denies nights sweats, denies  CP , SOB, melena. Aid accompanies her in her home 12 hours every day and another nurse comes on weekend. Today home health nurse changing sacral wound bandages patient found to have been hypotensive, malnourished-appearing, weak. Pt has been taking all medications as indicated (including torsemide for BP).    Request received for wound care consult for multiple wounds present on admission. Ms. Ham was encountered on an alternating air with low air loss surface. She was pleasantly confused and verbal. However, she demonstrated unreliability as a historian. She was seen with Dr. Muhammad. Primary team attending at bedside at the same time.    PAST MEDICAL & SURGICAL HISTORY:  Atherosclerotic cardiovascular disease: Stent 10 years ago  Spinal stenosis  Hyperlipidemia  Hypertension  History of left hip replacement    REVIEW OF SYSTEMS  Unable to obtain due to patient's condition    MEDICATIONS  (STANDING):  aspirin enteric coated 81 milliGRAM(s) Oral daily  atorvastatin 40 milliGRAM(s) Oral at bedtime  cefTRIAXone   IVPB 1000 milliGRAM(s) IV Intermittent every 24 hours  fluticasone propionate 50 MICROgram(s)/spray Nasal Spray 1 Spray(s) Both Nostrils daily  gabapentin 300 milliGRAM(s) Oral daily  heparin   Injectable 5000 Unit(s) SubCutaneous every 8 hours  multivitamin 1 Tablet(s) Oral daily  sodium chloride 0.9%. 1000 milliLiter(s) (50 mL/Hr) IV Continuous <Continuous>  vancomycin    Solution 125 milliGRAM(s) Oral every 6 hours    MEDICATIONS  (PRN):    Allergies    penicillin (Unknown)    Intolerances    SOCIAL HISTORY:  lives in private home    FAMILY HISTORY:  FH: multiple sclerosis: mom  FH: rheumatoid arthritis: daughter  FH: myocardial infarction: father    Vital Signs Last 24 Hrs  T(C): 37.2 (15 May 2020 12:18), Max: 37.2 (15 May 2020 12:18)  T(F): 99 (15 May 2020 12:18), Max: 99 (15 May 2020 12:18)  HR: 75 (15 May 2020 12:18) (54 - 102)  BP: 94/56 (15 May 2020 12:18) (80/50 - 122/75)  BP(mean): --  RR: 16 (15 May 2020 12:18) (16 - 24)  SpO2: 88% (15 May 2020 12:18) (88% - 100%)    Physical Exam:  General: Confused, Alert, frail  Respiratory: no SOB on room air  Gastrointestinal: soft NT/ND   Neurology: weakened strength & sensation grossly intact  Musculoskeletal: no contractures or deformities  Vascular: no BLE edema equal, DP/PT pulses palpable, BLE equally warm  Skin:  Sacral to left buttock wound L 8cm x W 10cm x D 1cm, majority of wound is covered with dry, firm, fixed eschar, scant serosanguinous drainage  Left trochanter wound - L 8cm x W 4cm x D 1cm, majority of wound is covered with dry, firm, fixed eschar, scant serosanguinous drainage  Left heel wound - L 5cm x W 5cm x D unknown - deep maroon hued intact skin, no drainage  RIght heel wound - L 5cm x W 5cm x D unknown - deep maroon hued skin with unroofed blister, small amount of serosanguinous drainage  No odor, erythema, increased warmth, tenderness, induration, fluctuance associated with any of the above wounds.    LABS:  05-15    138  |  101  |  34<H>  ----------------------------<  100<H>  4.2   |  25  |  1.68<H>    Ca    8.4      15 May 2020 07:05  Phos  2.9     05-15  Mg     1.8     05-15    TPro  4.8<L>  /  Alb  1.7<L>  /  TBili  0.2  /  DBili  x   /  AST  23  /  ALT  15  /  AlkPhos  117  05-15                          8.3    17.95 )-----------( 360      ( 15 May 2020 07:06 )             27.7     PT/INR - ( 14 May 2020 17:02 )   PT: 13.4 sec;   INR: 1.16 ratio         PTT - ( 14 May 2020 17:02 )  PTT:27.2 sec  Urinalysis Basic - ( 14 May 2020 18:12 )    Color: Yellow / Appearance: Slightly Turbid / S.016 / pH: x  Gluc: x / Ketone: Negative  / Bili: Small / Urobili: Negative   Blood: x / Protein: 30 mg/dL / Nitrite: Negative   Leuk Esterase: Large / RBC: 3 /hpf / WBC >50 /HPF   Sq Epi: x / Non Sq Epi: 0 / Bacteria: Occasional        RADIOLOGY & ADDITIONAL STUDIES:    EXAM:  CT CHEST                        EXAM:  CT ABDOMEN AND PELVIS                        PROCEDURE DATE:  2020    INTERPRETATION:  CLINICAL INFORMATION: Acute respiratory illness and diarrhea.     COMPARISON: CT chest, abdomen and pelvis 2018.    PROCEDURE:   CT of the Chest, Abdomen and Pelvis was performed without intravenous contrast.   Intravenous contrast: None.  Oral contrast: None.  Sagittal and coronal reformats were performed.    FINDINGS:    Evaluation of the solid visceral organs and vasculature is limited without the administration of intravenous contrast.     CHEST:     LUNGS AND LARGE AIRWAYS:  A 7.2 x 5 cm right upper lobe mass, previously 4.1 x 3.1 cm, now with paramediastinal extension. The mass now also narrows a right upper lobe bronchus. A stable 1.4 cm groundglass opacity in the left upper lobe (series 3, image 24). Bibasilar compressive atelectasis.  PLEURA: Small bilateral pleural effusions.  VESSELS: Atherosclerotic change of a normal caliber thoracic aorta. Main pulmonary artery is normal in caliber.  HEART: Heart size is normal. No pericardial effusion. Aortic valve and coronary artery atherosclerotic calcifications.  MEDIASTINUM AND AMY: No lymphadenopathy.  CHEST WALL AND LOWER NECK: Heterogeneous thyroid with a dominant left thyroid lobe nodule measuring 1.2 cm (series 3, image 4).    ABDOMEN AND PELVIS:    LIVER: Normal morphology.   BILE DUCTS: Normal caliber.  GALLBLADDER: Within normal limits.  SPLEEN: Within normal limits.  PANCREAS: Within normal limits.  ADRENALS: Within normal limits.  KIDNEYS/URETERS: Right renal cysts and additional subcentimeter hypodensities bilaterally that are too small to characterize. No hydronephrosis.    BLADDER: Underdistended around a Negro catheter.  REPRODUCTIVE ORGANS: Normal uterus. No solid adnexal masses.     BOWEL: Marked wall edema of the entire colon and rectum, compatible with proctocolitis. Colonic diverticulosis. Diffuse wall thickening of the large bowel with adjacent fat stranding. No bowel obstruction.  PERITONEUM: No ascites.  VESSELS: Atherosclerotic change. Stable ectasia of the infrarenal abdominal aorta.  RETROPERITONEUM/LYMPH NODES: Several subcentimeter in short axis retroperitoneal nodes.  Redemonstrated amorphous calcified material just anterior to the sacrum in the presacral space, nonspecific, but unchanged from 2018  ABDOMINAL WALL: Mild anasarca.  BONES: Degenerative changes. Status post left hip arthroplasty and left pelvic fixation. . Age indeterminate compression deformity of T12.    IMPRESSION:   Interval increase in size of a now 7.2 cm right upper lobe pulmonary mass, previously 4.1 cm, with new narrowing of a right upper lobe airway, highly suspicious for primary lung neoplasm.    Proctocolitis. Differential includes infectious, inflammatory or ischemic etiology.    Small bilateral pleural effusions.    Cultures:  2020 - + c diff

## 2020-05-15 NOTE — PROGRESS NOTE ADULT - PROBLEM SELECTOR PLAN 4
creatinine 2.06 ( from baseline .96)  Likely prerenal in setting of hypovolemia and infection   monitor BMP daily , renally dose all meds avoid nephrotoxic agents, monitor I's and O's   Indwelling kaur by ED, trial of void during day creatinine 2.06 ( from baseline .96), improving to 1.68  -Pre-renal etiology in setting of hypovolemia and infection   -monitor BMP daily , renally dose all meds avoid nephrotoxic agents, monitor I's and O's   Indwelling kaur by ED, trial of void tomorrow

## 2020-05-16 NOTE — PROGRESS NOTE ADULT - PROBLEM SELECTOR PLAN 3
- UA positive, Urine culture positive for GNR  - Continue with empiric Ceftriaxone while pending sensitivity   - Will treat for 3 days to minimize antibiotics exposure in the setting of C. diff colitis

## 2020-05-16 NOTE — PROGRESS NOTE ADULT - PROBLEM SELECTOR PLAN 1
Improving  - Patient with profuse diarrhea 7 X daily of watery stool in the setting of daily keflex  - C diff Positive, first episode of C. diff colitis  - Continue with Vancomycin po 125 mg q6 X 10 days  - Will hold home pantoprazole  given active c difficile infection and home donepezil given side effects of diarrhea

## 2020-05-16 NOTE — DIETITIAN INITIAL EVALUATION ADULT. - PROBLEM SELECTOR PLAN 1
- c/b pancolitis  Patient with profuse diarrhea 7 X daily of watery stool in the setting of daily keflex  C diff Positive, will start vancomycin po 125 mg q6 X 10 days  CT abdomen and pelvis: Proctocolitis. Differential includes infectious, inflammatory or ischemic etiology. Small bilateral pleural effusions.  Will continue with maintenance fluids for hydration X 10 hours  will hold home pantoprazole  given active c difficile infection and home donepezil given side effects of diarrhea  Stool count

## 2020-05-16 NOTE — PROGRESS NOTE ADULT - PROBLEM SELECTOR PLAN 9
DVT PPX: heparin sub q   Diet: Diet/ TLC w/ ensure  Dispo: pending improvement in clinical status  Code Status: DNR/DNI, no feeding tube, MOLST filled out

## 2020-05-16 NOTE — DIETITIAN INITIAL EVALUATION ADULT. - ADD RECOMMEND
liberalize diet to Regular, change supplement to Suplena 3 x daily given elevated creatinine and prerenal. add Vit C and Gerhard x 2 daily. continue Multivitamin. spoke to provider regarding changes. placed sticker for malnutrition.

## 2020-05-16 NOTE — CHART NOTE - NSCHARTNOTEFT_GEN_A_CORE
Upon Nutritional Assessment by the Registered Dietitian your patient was determined to meet criteria / has evidence of the following diagnosis/diagnoses:          [ ]  Mild Protein Calorie Malnutrition        [ ]  Moderate Protein Calorie Malnutrition        [x ] Severe Protein Calorie Malnutrition        [ ] Unspecified Protein Calorie Malnutrition        [ ] Underweight / BMI <19        [ ] Morbid Obesity / BMI > 40      Findings as based on:  [x ] Comprehensive nutrition assessment:  weight loss, poor intake, edema.   [ ] Nutrition Focused Physical Exam  [ ] Other:       Nutrition Plan/Recommendations:    liberalize diet to Regular, change supplement to Suplena 3 x daily given elevated creatinine and prerenal. add Vit C and Gerhard x 2 daily. continue Multivitamin. spoke to provider regarding changes. placed sticker for malnutrition.      PROVIDER Section:     By signing this assessment you are acknowledging and agree with the diagnosis/diagnoses assigned by the Registered Dietitian    Comments:

## 2020-05-16 NOTE — PROGRESS NOTE ADULT - PROBLEM SELECTOR PLAN 8
-110/50-60  - Holding antihypertensives in the setting of hypotension including amlodipine torsemide

## 2020-05-16 NOTE — PROGRESS NOTE ADULT - SUBJECTIVE AND OBJECTIVE BOX
Nicci Sim PGY-3  Internal medicine Team 4  Pager [phone]    CHIEF COMPLAINT: diarrhea, hypotension    Interval Events: No acute event overnight. Patient remains hemodynamically stable. Patient is seen and examined at the bedside this morning. Diarrhea improving, mild abdominal pain.     REVIEW OF SYSTEMS:  Constitutional: No fever, or chills.    HEENT: No dry eyes or eye irritation.    CV: No chest pain   Resp: No cough   GI: No nausea or vomiting   Musculoskeletal: No back pain   Skin: No rash      OBJECTIVE:  Vital Signs Last 24 Hrs  T(C): 36.4 (16 May 2020 05:44), Max: 37.2 (15 May 2020 12:18)  T(F): 97.6 (16 May 2020 05:44), Max: 99 (15 May 2020 12:18)  HR: 85 (16 May 2020 05:44) (75 - 98)  BP: 102/52 (16 May 2020 05:44) (92/63 - 138/64)  BP(mean): --  RR: 16 (16 May 2020 05:44) (16 - 18)  SpO2: 96% (16 May 2020 05:44) (88% - 100%)    05-15 @ 07:01  -  -16 @ 07:00  --------------------------------------------------------  IN: 1670 mL / OUT: 675 mL / NET: 995 mL      CAPILLARY BLOOD GLUCOSE          PHYSICAL EXAM:  CONSTITUTIONAL: NAD, frail female, resting NC in place  RESPIRATORY: Normal respiratory effort; lungs are clear to auscultation bilaterally  CARDIOVASCULAR: irregular rate/rhythm, No lower extremity edema; Peripheral pulses are 2+ bilaterally  ABDOMEN: mildly tender to palpation, no rebound/guarding, active b owel sound  MUSCLOSKELETAL: no clubbing or cyanosis of digits; no joint swelling or tenderness to palpation  PSYCH: A+O to person, affect appropriate    LINES:    HOSPITAL MEDICATIONS:  aspirin enteric coated 81 milliGRAM(s) Oral daily  heparin   Injectable 5000 Unit(s) SubCutaneous every 8 hours    cefTRIAXone   IVPB 1000 milliGRAM(s) IV Intermittent every 24 hours  vancomycin    Solution 125 milliGRAM(s) Oral every 6 hours      atorvastatin 40 milliGRAM(s) Oral at bedtime      gabapentin 300 milliGRAM(s) Oral daily          multivitamin 1 Tablet(s) Oral daily  sodium chloride 0.9%. 1000 milliLiter(s) IV Continuous <Continuous>      fluticasone propionate 50 MICROgram(s)/spray Nasal Spray 1 Spray(s) Both Nostrils daily        LABS:                        9.0    10.29 )-----------( 389      ( 16 May 2020 06:31 )             31.5     Hgb Trend: 9.0<--, 8.3<--, 8.9<--  05-16    141  |  105  |  24<H>  ----------------------------<  82  3.7   |  25  |  1.35<H>    Ca    9.0      16 May 2020 06:31  Phos  2.7       Mg     1.9         TPro  5.0<L>  /  Alb  1.9<L>  /  TBili  0.2  /  DBili  x   /  AST  21  /  ALT  15  /  AlkPhos  115      Creatinine Trend: 1.35<--, 1.68<--, 2.06<--  PT/INR - ( 14 May 2020 17:02 )   PT: 13.4 sec;   INR: 1.16 ratio         PTT - ( 14 May 2020 17:02 )  PTT:27.2 sec  Urinalysis Basic - ( 14 May 2020 18:12 )    Color: Yellow / Appearance: Slightly Turbid / S.016 / pH: x  Gluc: x / Ketone: Negative  / Bili: Small / Urobili: Negative   Blood: x / Protein: 30 mg/dL / Nitrite: Negative   Leuk Esterase: Large / RBC: 3 /hpf / WBC >50 /HPF   Sq Epi: x / Non Sq Epi: 0 / Bacteria: Occasional        Venous Blood Gas:   @ 06:31  7.37/51/34/28/60  VBG Lactate: 1.1  Venous Blood Gas:   @ :15  7.34/58/22/31/27  VBG Lactate: 2.7      MICROBIOLOGY:   Culture - Blood (05.15.20 @ 01:17)    Specimen Source: .Blood Blood-Peripheral    Culture Results:   No growth to date.      Culture - Urine (05.15.20 @ 01:04)    Specimen Source: .Urine Catheterized    Culture Results:   >100,000 CFU/ml Gram Negative Rods      RADIOLOGY:  < from: CT Abdomen and Pelvis No Cont (20 @ 18:29) >  FINDINGS:    Evaluation of the solid visceral organs and vasculature is limited without the administration of intravenous contrast.     CHEST:     LUNGS AND LARGE AIRWAYS:  A 7.2 x 5 cm right upper lobe mass, previously 4.1 x 3.1 cm, now with paramediastinal extension. The mass now also narrows a right upper lobe bronchus. A stable 1.4 cm groundglass opacity in the left upper lobe (series 3, image 24). Bibasilar compressive atelectasis.  PLEURA: Small bilateral pleural effusions.  VESSELS: Atherosclerotic change of a normal caliber thoracic aorta. Main pulmonary artery is normal in caliber.  HEART: Heart size is normal. No pericardial effusion. Aortic valve and coronary artery atherosclerotic calcifications.  MEDIASTINUM AND AMY: No lymphadenopathy.  CHEST WALL AND LOWER NECK: Heterogeneous thyroid with a dominant left thyroid lobe nodule measuring 1.2 cm (series 3, image 4).    ABDOMEN AND PELVIS:    LIVER: Normal morphology.   BILE DUCTS: Normal caliber.  GALLBLADDER: Within normal limits.  SPLEEN: Within normal limits.  PANCREAS: Within normal limits.  ADRENALS: Within normal limits.  KIDNEYS/URETERS: Right renal cysts and additional subcentimeter hypodensities bilaterally that are too small to characterize. No hydronephrosis.    BLADDER: Underdistended around a Negro catheter.  REPRODUCTIVE ORGANS: Normal uterus. No solid adnexal masses.     BOWEL: Marked wall edema of the entire colon and rectum, compatible with proctocolitis. Colonic diverticulosis. Diffuse wall thickening of the large bowel with adjacent fat stranding. No bowel obstruction.  PERITONEUM: No ascites.  VESSELS: Atherosclerotic change. Stable ectasia of the infrarenal abdominal aorta.  RETROPERITONEUM/LYMPH NODES: Several subcentimeter in short axis retroperitoneal nodes.  Redemonstrated amorphous calcified material just anterior to the sacrum in the presacral space, nonspecific, but unchanged from 2018  ABDOMINAL WALL: Mild anasarca.  BONES: Degenerative changes. Status post left hip arthroplasty and left pelvic fixation. .Age indeterminate compression deformity of T12.    IMPRESSION:   Interval increase in size of a now 7.2 cm right upper lobe pulmonary mass, previously 4.1 cm, with new narrowing of a right upper lobe airway, highly suspicious for primary lung neoplasm.    Proctocolitis. Differential includes infectious, inflammatory or ischemic etiology.    Small bilateral pleural effusions.    < end of copied text >

## 2020-05-16 NOTE — PROGRESS NOTE ADULT - PROBLEM SELECTOR PLAN 7
- Elevated troponin likely in setting of demand ischemia, downtrending on repeat  -no need to trend further  - EKG with sinus arrythmia with anterior q waves will compare with prior EKG, continue to monitor

## 2020-05-16 NOTE — DIETITIAN INITIAL EVALUATION ADULT. - PROBLEM SELECTOR PLAN 7
Elevated troponin likely in setting of demand ischemia   Will recheck trop on next set of labs  EKG with sinus arrythmia with anterior q waves will compare with prior EKG

## 2020-05-16 NOTE — PROGRESS NOTE ADULT - ASSESSMENT
89F w/ PMH htn, hld, spinal stenosis, MI, left hip replacement, recent sacral decub ulcers p/w failure to thrive and profuse diarrhea X 4 weeks admitted with sepsis 2/2 to c difficile and UTI, now improving.

## 2020-05-16 NOTE — DIETITIAN INITIAL EVALUATION ADULT. - PROBLEM SELECTOR PLAN 2
severe sepsi, c/b hypotension. also tachypnea and leukocytosis  Etiology mostly likely from c diff, however with positive UA for LE , WBC > 50 , nitrites negative, Patient additionally has notable decub ulcer and right heel ulcer as well which needs attending . Patient with pancolitis on Ct abdomen and pelvis   f/u urine culture, f/u blood cultures   Start Po Vancomycin 125 q6 X 10 days, patient currently  Will treat UTI with ceftriaxone   Will continue with NS 70 CC / hour X 10 hours , given patient severely hypovolemic  Wound care  hold antihypertensives  Vitals q4  ID consult in AM

## 2020-05-16 NOTE — PROGRESS NOTE ADULT - PROBLEM SELECTOR PLAN 5
- CT abdomen and pelvis: Interval increase in size of a now 7.2 cm right upper lobe pulmonary mass, previously 4.1 cm, with new narrowing of a right upper lobe airway, highly suspicious for primary lung neoplasm.  - Family does not want to work up neoplasm, aware of previous suspicious lesion prior to admission, confirmed w/ family on 5/15; family also does not want patient to be told about pulm mass

## 2020-05-16 NOTE — DIETITIAN INITIAL EVALUATION ADULT. - PERTINENT MEDS FT
aspirin enteric coated 81  atorvastatin 40  cefTRIAXone   IVPB 1000  fluticasone propionate 50 MICROgram(s)/spray Nasal Spray 1  gabapentin 300  heparin   Injectable 5000  multivitamin 1  sodium chloride 0.9%. 1000  vancomycin    Solution 125

## 2020-05-16 NOTE — DIETITIAN INITIAL EVALUATION ADULT. - PROBLEM SELECTOR PLAN 5
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  Family does not want to work up neoplasm, aware of previous suspicious lesion prior to admission

## 2020-05-16 NOTE — PROGRESS NOTE ADULT - PROBLEM SELECTOR PLAN 4
Improving  - Creatinine 2.06 (from baseline .96), improving to 1.35  - Pre-renal etiology in setting of hypovolemia and infection   - Monitor BMP daily , renally dose all meds avoid nephrotoxic agents, monitor I's and O's   - Will do TOV

## 2020-05-16 NOTE — DIETITIAN INITIAL EVALUATION ADULT. - OTHER INFO
pt confused  Reason for admission : diarrhea, hypotension  Diet PTA :  Intake : 20%  nausea/vomit :?  difficulty chewing /swallow :?  diarrhea related to C-DIFF  Last BM : 5/15  NKFA  IBW +/- 10%=120pounds  Ht: 64"  Ht taken from EMR heading  Usual Weight PTA: 11/2018: 155.2pounds, admission weight 129.8pounds; 17% loss over 1 year and 7 months.   BMI: 22.2  BMI calculated using wt from EMR heading  BMI calculated using ht from EMR heading  Education Provided : N/A  pressure injury: unstageable left hip, unstageable sacrum, suspected DTI left heel, suspected DTI right heel  edema: +1 left leg, right leg, left ankle, right ankle, left foot, right foot.

## 2020-05-16 NOTE — PROGRESS NOTE ADULT - PROBLEM SELECTOR PLAN 2
Resolved   - Severe sepsis, c/b hypotension. also tachypnea and leukocytosis  - Etiology mostly likely from c diff and UTI   - For C. diff colitis, continue with po Vancomycin   - For UTI, urine culture showing GNR, pending speciation and sensitivity, continue with Ceftriaxone for now  - Leukocytosis now resolved

## 2020-05-16 NOTE — PROGRESS NOTE ADULT - PROBLEM SELECTOR PLAN 6
- Severe protein/ caloric malnutrition  - Nutrition consulted, continue with ensure for supplement  - PT eval

## 2020-05-16 NOTE — DIETITIAN INITIAL EVALUATION ADULT. - PHYSICAL APPEARANCE
Unable to conduct Nutrition Focused Physical Assessment due to contact/isolation precautions r/t current medical condition./underweight

## 2020-05-16 NOTE — DIETITIAN INITIAL EVALUATION ADULT. - PROBLEM SELECTOR PLAN 4
creatinine 2.06 ( from baseline .96)  Likely prerenal in setting of hypovolemia and infection   monitor BMP daily , renally dose all meds avoid nephrotoxic agents, monitor I's and O's   Indwelling kaur by ED, trial of void during day

## 2020-05-17 NOTE — PROGRESS NOTE ADULT - SUBJECTIVE AND OBJECTIVE BOX
Dalia Garvin, PGY-1  Internal Medicine  Pager: [phone], D [phone]    PROGRESS NOTE:     Patient is a 89y old  Female who presents with a chief complaint of diarrhea, hypotension (16 May 2020 07:40)      SUBJECTIVE / OVERNIGHT EVENTS: No acute events overnight    ADDITIONAL REVIEW OF SYSTEMS:    MEDICATIONS  (STANDING):  ascorbic acid 500 milliGRAM(s) Oral daily  aspirin enteric coated 81 milliGRAM(s) Oral daily  atorvastatin 40 milliGRAM(s) Oral at bedtime  cefepime   IVPB 500 milliGRAM(s) IV Intermittent every 24 hours  fluticasone propionate 50 MICROgram(s)/spray Nasal Spray 1 Spray(s) Both Nostrils daily  gabapentin 300 milliGRAM(s) Oral daily  heparin   Injectable 5000 Unit(s) SubCutaneous every 12 hours  lactated ringers. 1000 milliLiter(s) (50 mL/Hr) IV Continuous <Continuous>  multivitamin 1 Tablet(s) Oral daily  vancomycin    Solution 125 milliGRAM(s) Oral every 6 hours    MEDICATIONS  (PRN):      CAPILLARY BLOOD GLUCOSE        I&O's Summary    16 May 2020 07:01  -  17 May 2020 07:00  --------------------------------------------------------  IN: 1350 mL / OUT: 600 mL / NET: 750 mL        PHYSICAL EXAM:  Vital Signs Last 24 Hrs  T(C): 36.8 (17 May 2020 04:00), Max: 36.8 (17 May 2020 04:00)  T(F): 98.3 (17 May 2020 04:00), Max: 98.3 (17 May 2020 04:00)  HR: 93 (17 May 2020 04:00) (92 - 99)  BP: 135/80 (17 May 2020 04:00) (112/62 - 135/80)  BP(mean): --  RR: 17 (17 May 2020 04:00) (17 - 18)  SpO2: 96% (17 May 2020 04:00) (96% - 97%)    CONSTITUTIONAL: NAD, well-developed  RESPIRATORY: Normal respiratory effort; lungs are clear to auscultation bilaterally  CARDIOVASCULAR: Regular rate and rhythm, normal S1 and S2, no murmur/rub/gallop; No lower extremity edema; Peripheral pulses are 2+ bilaterally  ABDOMEN: Nontender to palpation, normoactive bowel sounds, no rebound/guarding; No hepatosplenomegaly  MUSCLOSKELETAL: no clubbing or cyanosis of digits; no joint swelling or tenderness to palpation  PSYCH: A+O to person, place, and time; affect appropriate    LABS:                        9.3    9.97  )-----------( 413      ( 17 May 2020 07:10 )             30.7     05-16    141  |  105  |  24<H>  ----------------------------<  82  3.7   |  25  |  1.35<H>    Ca    9.0      16 May 2020 06:31  Phos  2.7     05-16  Mg     1.9     05-16    TPro  5.0<L>  /  Alb  1.9<L>  /  TBili  0.2  /  DBili  x   /  AST  21  /  ALT  15  /  AlkPhos  115  05-16              Culture - Blood (collected 15 May 2020 01:17)  Source: .Blood Blood-Peripheral  Preliminary Report (16 May 2020 02:01):    No growth to date.    Culture - Blood (collected 15 May 2020 01:17)  Source: .Blood Blood-Peripheral  Gram Stain (16 May 2020 22:00):    Growth in aerobic bottle: Gram Positive Cocci in Clusters  Preliminary Report (16 May 2020 22:01):    Growth in aerobic bottle: Gram Positive Cocci in Clusters    "Due to technical problems, Proteus sp. will Not be reported as part of    the BCID panel until further notice"    ***Blood Panel PCR results on this specimen are available    approximately 3 hours after the Gram stain result.***    Gram stain, PCR, and/or culture results may not always    correspond due to difference in methodologies.    ************************************************************    This PCR assay was performed using [employer].    The following targets are tested for: Enterococcus,    vancomycin resistant enterococci, Listeria monocytogenes,    coagulase negative staphylococci, S. aureus,    methicillin resistant S. aureus, Streptococcus agalactiae    (Group B), S. pneumoniae, S.pyogenes (Group A),    Acinetobacter baumannii, Enterobacter cloacae, E. coli,    Klebsiella oxytoca, K. pneumoniae, Proteus sp.,    Serratia marcescens, Haemophilus influenzae,    Neisseria meningitidis, Pseudomonas aeruginosa, Candida    albicans, C. glabrata, C krusei, C parapsilosis,    C. tropicalis and the KPC resistance gene.  Organism: Blood Culture PCR (16 May 2020 23:09)  Organism: Blood Culture PCR (16 May 2020 23:09)    Culture - Urine (collected 15 May 2020 01:04)  Source: .Urine Catheterized  Final Report (16 May 2020 19:38):    >100,000 CFU/ml Enterobacter cloacae  Organism: Enterobacter cloacae (16 May 2020 19:38)  Organism: Enterobacter cloacae (16 May 2020 19:38)        RADIOLOGY & ADDITIONAL TESTS:  Results Reviewed:   Imaging Personally Reviewed:  Electrocardiogram Personally Reviewed:    COORDINATION OF CARE:  Care Discussed with Consultants/Other Providers [Y/N]:  Prior or Outpatient Records Reviewed [Y/N]: Dalia Garvin, PGY-1  Internal Medicine  Pager: [phone], [phone]    PROGRESS NOTE:     Patient is a 89y old  Female who presents with a chief complaint of diarrhea, hypotension (16 May 2020 07:40)      SUBJECTIVE / OVERNIGHT EVENTS: No acute events overnight, patient seen this AM. She has no complaints, states that her abdominal pain has improved. Otherwise denies fevers, chills, chest pain, shortness of breath. She had 2 BMs overnight per nurse    ADDITIONAL REVIEW OF SYSTEMS: neg unless noted above    MEDICATIONS  (STANDING):  ascorbic acid 500 milliGRAM(s) Oral daily  aspirin enteric coated 81 milliGRAM(s) Oral daily  atorvastatin 40 milliGRAM(s) Oral at bedtime  cefepime   IVPB 500 milliGRAM(s) IV Intermittent every 24 hours  fluticasone propionate 50 MICROgram(s)/spray Nasal Spray 1 Spray(s) Both Nostrils daily  gabapentin 300 milliGRAM(s) Oral daily  heparin   Injectable 5000 Unit(s) SubCutaneous every 12 hours  lactated ringers. 1000 milliLiter(s) (50 mL/Hr) IV Continuous <Continuous>  multivitamin 1 Tablet(s) Oral daily  vancomycin    Solution 125 milliGRAM(s) Oral every 6 hours    MEDICATIONS  (PRN):      CAPILLARY BLOOD GLUCOSE        I&O's Summary    16 May 2020 07:01  -  17 May 2020 07:00  --------------------------------------------------------  IN: 1350 mL / OUT: 600 mL / NET: 750 mL        PHYSICAL EXAM:  Vital Signs Last 24 Hrs  T(C): 36.8 (17 May 2020 04:00), Max: 36.8 (17 May 2020 04:00)  T(F): 98.3 (17 May 2020 04:00), Max: 98.3 (17 May 2020 04:00)  HR: 93 (17 May 2020 04:00) (92 - 99)  BP: 135/80 (17 May 2020 04:00) (112/62 - 135/80)  BP(mean): --  RR: 17 (17 May 2020 04:00) (17 - 18)  SpO2: 96% (17 May 2020 04:00) (96% - 97%)    CONSTITUTIONAL: NAD, frail female, resting NC in place  RESPIRATORY: Normal respiratory effort; lungs are clear to auscultation bilaterally  CARDIOVASCULAR: irregular rate/rhythm, No lower extremity edema; Peripheral pulses are 2+ bilaterally  ABDOMEN: nontender to palpation, no rebound/guarding, active bowel sounds  PSYCH: A+O x3, affect appropriate    LABS:                        9.3    9.97  )-----------( 413      ( 17 May 2020 07:10 )             30.7     05-16    141  |  105  |  24<H>  ----------------------------<  82  3.7   |  25  |  1.35<H>    Ca    9.0      16 May 2020 06:31  Phos  2.7     05-16  Mg     1.9     05-16    TPro  5.0<L>  /  Alb  1.9<L>  /  TBili  0.2  /  DBili  x   /  AST  21  /  ALT  15  /  AlkPhos  115  05-16              Culture - Blood (collected 15 May 2020 01:17)  Source: .Blood Blood-Peripheral  Preliminary Report (16 May 2020 02:01):    No growth to date.    Culture - Blood (collected 15 May 2020 01:17)  Source: .Blood Blood-Peripheral  Gram Stain (16 May 2020 22:00):    Growth in aerobic bottle: Gram Positive Cocci in Clusters  Preliminary Report (16 May 2020 22:01):    Growth in aerobic bottle: Gram Positive Cocci in Clusters    "Due to technical problems, Proteus sp. will Not be reported as part of    the BCID panel until further notice"    ***Blood Panel PCR results on this specimen are available    approximately 3 hours after the Gram stain result.***    Gram stain, PCR, and/or culture results may not always    correspond due to difference in methodologies.    ************************************************************    This PCR assay was performed using [employer].    The following targets are tested for: Enterococcus,    vancomycin resistant enterococci, Listeria monocytogenes,    coagulase negative staphylococci, S. aureus,    methicillin resistant S. aureus, Streptococcus agalactiae    (Group B), S. pneumoniae, S.pyogenes (Group A),    Acinetobacter baumannii, Enterobacter cloacae, E. coli,    Klebsiella oxytoca, K. pneumoniae, Proteus sp.,    Serratia marcescens, Haemophilus influenzae,    Neisseria meningitidis, Pseudomonas aeruginosa, Candida    albicans, C. glabrata, C krusei, C parapsilosis,    C. tropicalis and the KPC resistance gene.  Organism: Blood Culture PCR (16 May 2020 23:09)  Organism: Blood Culture PCR (16 May 2020 23:09)    Culture - Urine (collected 15 May 2020 01:04)  Source: .Urine Catheterized  Final Report (16 May 2020 19:38):    >100,000 CFU/ml Enterobacter cloacae  Organism: Enterobacter cloacae (16 May 2020 19:38)  Organism: Enterobacter cloacae (16 May 2020 19:38)        RADIOLOGY & ADDITIONAL TESTS:  Results Reviewed:   Imaging Personally Reviewed:  Electrocardiogram Personally Reviewed:    COORDINATION OF CARE:  Care Discussed with Consultants/Other Providers [Y/N]:  Prior or Outpatient Records Reviewed [Y/N]:

## 2020-05-17 NOTE — PROGRESS NOTE ADULT - PROBLEM SELECTOR PLAN 2
Resolved   - Severe sepsis, c/b hypotension. also tachypnea and leukocytosis  - Etiology mostly likely from c diff and UTI   - For C. diff colitis, continue with po Vancomycin   - For UTI, urine culture showing Enterobacter resistant to CTX, transitioned to Cefepime on 5/17  - Leukocytosis now resolved

## 2020-05-17 NOTE — PROVIDER CONTACT NOTE (CRITICAL VALUE NOTIFICATION) - TEST AND RESULT REPORTED:
Blood culture collected on 5/14/20 preliminary result growth in aerobic bottle gram positive cocci in clusters

## 2020-05-17 NOTE — PROGRESS NOTE ADULT - PROBLEM SELECTOR PLAN 3
- UA positive, Urine culture positive for Enterobacter, resistant to CTX switched to Cefepime on 5/17  - Will treat for 3 days to minimize antibiotics exposure in the setting of C. diff colitis

## 2020-05-18 NOTE — PROGRESS NOTE ADULT - PROBLEM SELECTOR PLAN 9
DVT PPX: heparin sub q   Diet: Diet/ TLC w/ ensure  Dispo: pending improvement in clinical status  Code Status: DNR/DNI, no feeding tube, MOLST filled out DVT PPX: heparin sub q   Diet: Diet/ TLC w/ ensure  Dispo: pending podiatry eval for PT eval  Code Status: DNR/DNI, no feeding tube, MOLST filled out

## 2020-05-18 NOTE — CONSULT NOTE ADULT - ASSESSMENT
Bilateral superfical heel wounds --non infected  -- Pt seen and evaluated bedside  --Sharp excisional debridement of hyperkeratotic tissue of R heel with scissors to level of subcutaneous tissue and not beyond   --Continue with normal saline cleanse with aquacell/adaptic touch and dsd daily  -- recomend z offloading heel boots  --will montior for infection and continued wound care management  -- Seen with Dr. Santiago

## 2020-05-18 NOTE — PROGRESS NOTE ADULT - PROBLEM SELECTOR PLAN 6
- Severe protein/ caloric malnutrition  - Nutrition consulted, continue with suplena for supplement  - PT eval pending - Severe protein/ caloric malnutrition  - Nutrition consulted, continue with suplena for supplement  - PT eval pending podiatry weight bearing assessment

## 2020-05-18 NOTE — CONSULT NOTE ADULT - SUBJECTIVE AND OBJECTIVE BOX
Podiatry pager #: [phone] Pager:  [phone] [facility] Pager: [phone]    Patient is a 89y old  Female who presents with a chief complaint of diarrhea, hypotension (18 May 2020 07:33)      HPI:  89F w/ PMH htn, hld, spinal stenosis, MI, left hip replacement on chronic oral keflex, dementia, recent sacral decub ulcers p/w failure to thrive and profuse diarrhea X 4 weeks .Patient has had watery stools for four weeks. 7 watery stools a day.  Patient takes 1 capsule of keflex daily ( on this regimen for greater 1 year )  to assist with treatment of infection on her hip. Patient began having diarrhea was self treated at home with imodium. Patient trialed peptobismol and taking pedialyte at home 1 .5L at day with persistent diarrhea. Was causing the patient to have sores. Dr. Eddy Roa, pcp, sent in a visiting nurse who was trying to assist with wound care . Patient was given BRAT diet, with decreased po intake. Patient has had a change in her mental status, while she has mild dementia AoX2 at baseline. Able to talk, with poor hearing ( does not have hearing aids with her). Complaints of significant weakness, patient currently not ambulating, no falls. No fevers, cough, CP, SOB, no abd pain or urinary sxs.  previously used walker for ambulation. Unable to do so now. No fevers, chills, denies nights sweats, denies  CP , SOB, melena. Aid accompanies her in her home 12 hours every day and another nurse comes on weekend. Today home health nurse changing sacral wound bandages patient found to have been hypotensive, malnourished-appearing, weak. Pt has been taking all medications as indicated (including torsemide for BP).    VS in the ED: BP 80/50, HR 54, RR24, T 98.5 F saturating 100 % on NC (14 May 2020 21:22)      PAST MEDICAL & SURGICAL HISTORY:  Atherosclerotic cardiovascular disease: Stent 10 years ago  Spinal stenosis  Hyperlipidemia  Hypertension  History of left hip replacement      MEDICATIONS  (STANDING):  ascorbic acid 500 milliGRAM(s) Oral daily  aspirin enteric coated 81 milliGRAM(s) Oral daily  atorvastatin 40 milliGRAM(s) Oral at bedtime  cefepime   IVPB 500 milliGRAM(s) IV Intermittent every 24 hours  fluticasone propionate 50 MICROgram(s)/spray Nasal Spray 1 Spray(s) Both Nostrils daily  gabapentin 300 milliGRAM(s) Oral daily  heparin   Injectable 5000 Unit(s) SubCutaneous every 12 hours  multivitamin 1 Tablet(s) Oral daily  vancomycin    Solution 125 milliGRAM(s) Oral every 6 hours    MEDICATIONS  (PRN):      Allergies    penicillin (Unknown)    Intolerances        VITALS:    Vital Signs Last 24 Hrs  T(C): 36.6 (18 May 2020 12:53), Max: 36.7 (17 May 2020 20:20)  T(F): 97.9 (18 May 2020 12:53), Max: 98 (17 May 2020 20:20)  HR: 92 (18 May 2020 12:53) (92 - 99)  BP: 136/70 (18 May 2020 12:53) (125/66 - 137/76)  BP(mean): --  RR: 18 (18 May 2020 12:53) (18 - 18)  SpO2: 95% (18 May 2020 12:53) (95% - 98%)    LABS:                          9.8    9.78  )-----------( 412      ( 18 May 2020 07:08 )             32.2       05-18    142  |  107  |  20  ----------------------------<  105<H>  4.0   |  26  |  1.00    Ca    9.5      18 May 2020 07:08  Phos  1.9     05-18  Mg     1.9     05-18    TPro  5.3<L>  /  Alb  2.1<L>  /  TBili  0.2  /  DBili  x   /  AST  28  /  ALT  19  /  AlkPhos  132<H>  05-18      CAPILLARY BLOOD GLUCOSE              LOWER EXTREMITY PHYSICAL EXAM:  Vasular: DP/PT _1/4, B/L, CFT <2_ seconds B/L, Temperature gradient wnl_, B/L.   Neuro: Epicritic sensation _intact to the level of _toes, B/L.  Musculoskeletal/Ortho:  Skin:  Wound #1: heels  Location: bilateral plantar aspect  Size: 2cmm diameter  Depth:2mm  Wound bed: granular after ruptured bullae  Drainage: none  Odor: none  Periwound:mimimal erythema  Etiology: bullae    RADIOLOGY & ADDITIONAL STUDIES:

## 2020-05-18 NOTE — PROGRESS NOTE ADULT - PROBLEM SELECTOR PLAN 3
- UA positive, Urine culture positive for Enterobacter, resistant to CTX switched to Cefepime on 5/17  - Will treat for 3 days to minimize antibiotics exposure in the setting of C. diff colitis - UA positive, Urine culture positive for Enterobacter, resistant to CTX switched to Cefepime on 5/17  - Will treat for 3 days to minimize antibiotics exposure in the setting of C. diff colitis, today is day 2/3

## 2020-05-18 NOTE — PROGRESS NOTE ADULT - PROBLEM SELECTOR PLAN 2
Resolved   - Severe sepsis, c/b hypotension. also tachypnea and leukocytosis  - Etiology mostly likely from c diff and UTI   - For C. diff colitis, continue with po Vancomycin   - For UTI, urine culture showing Enterobacter resistant to CTX, transitioned to Cefepime on 5/17  - Leukocytosis now resolved Resolved   - Severe sepsis, c/b hypotension. also tachypnea and leukocytosis  - Etiology mostly likely from c diff and UTI   - For C. diff colitis, continue with po Vancomycin   - For UTI, urine culture showing Enterobacter resistant to CTX, transitioned to Cefepime on 5/17 for 3 day course  - Leukocytosis now resolved

## 2020-05-18 NOTE — PROGRESS NOTE ADULT - SUBJECTIVE AND OBJECTIVE BOX
Dalia Garvin, PGY-1  Internal Medicine  Pager: [phone], [phone]    PROGRESS NOTE:     Patient is a 89y old  Female who presents with a chief complaint of diarrhea, hypotension (17 May 2020 07:49)      SUBJECTIVE / OVERNIGHT EVENTS: No acute events overnight, patient still on 2L NC    ADDITIONAL REVIEW OF SYSTEMS:    MEDICATIONS  (STANDING):  ascorbic acid 500 milliGRAM(s) Oral daily  aspirin enteric coated 81 milliGRAM(s) Oral daily  atorvastatin 40 milliGRAM(s) Oral at bedtime  cefepime   IVPB 500 milliGRAM(s) IV Intermittent every 24 hours  fluticasone propionate 50 MICROgram(s)/spray Nasal Spray 1 Spray(s) Both Nostrils daily  gabapentin 300 milliGRAM(s) Oral daily  heparin   Injectable 5000 Unit(s) SubCutaneous every 12 hours  multivitamin 1 Tablet(s) Oral daily  vancomycin    Solution 125 milliGRAM(s) Oral every 6 hours    MEDICATIONS  (PRN):      CAPILLARY BLOOD GLUCOSE        I&O's Summary    17 May 2020 07:01  -  18 May 2020 07:00  --------------------------------------------------------  IN: 970 mL / OUT: 700 mL / NET: 270 mL        PHYSICAL EXAM:  Vital Signs Last 24 Hrs  T(C): 36.5 (18 May 2020 06:47), Max: 36.7 (17 May 2020 10:48)  T(F): 97.7 (18 May 2020 06:47), Max: 98 (17 May 2020 10:48)  HR: 99 (18 May 2020 06:47) (72 - 99)  BP: 125/66 (18 May 2020 06:47) (123/75 - 130/71)  BP(mean): --  RR: 18 (18 May 2020 06:47) (18 - 18)  SpO2: 95% (18 May 2020 06:47) (95% - 98%)    CONSTITUTIONAL: NAD, well-developed  RESPIRATORY: Normal respiratory effort; lungs are clear to auscultation bilaterally  CARDIOVASCULAR: Regular rate and rhythm, normal S1 and S2, no murmur/rub/gallop; No lower extremity edema; Peripheral pulses are 2+ bilaterally  ABDOMEN: Nontender to palpation, normoactive bowel sounds, no rebound/guarding; No hepatosplenomegaly  MUSCLOSKELETAL: no clubbing or cyanosis of digits; no joint swelling or tenderness to palpation  PSYCH: A+O to person, place, and time; affect appropriate    LABS:                        9.8    9.78  )-----------( 412      ( 18 May 2020 07:08 )             32.2     05-17    143  |  107  |  20  ----------------------------<  110<H>  4.0   |  26  |  1.03    Ca    9.4      17 May 2020 07:10  Phos  2.0     05-17  Mg     1.9     05-17                  RADIOLOGY & ADDITIONAL TESTS:  Results Reviewed:   Imaging Personally Reviewed:  Electrocardiogram Personally Reviewed:    COORDINATION OF CARE:  Care Discussed with Consultants/Other Providers [Y/N]:  Prior or Outpatient Records Reviewed [Y/N]: Dalia Garvin, PGY-1  Internal Medicine  Pager: [phone], C [phone]    PROGRESS NOTE:     Patient is a 89y old  Female who presents with a chief complaint of diarrhea, hypotension (17 May 2020 07:49)      SUBJECTIVE / OVERNIGHT EVENTS: No acute events overnight, patient still on 2L NC though she intermittently is able to remove it though her sat drops to the high 80s. Patient seen this AM, she has no complaints, denies chest pain, shortness of breath, and abdominal pain. Patient had 1 BM overnight but still not formed    ADDITIONAL REVIEW OF SYSTEMS: neg unless noted above    MEDICATIONS  (STANDING):  ascorbic acid 500 milliGRAM(s) Oral daily  aspirin enteric coated 81 milliGRAM(s) Oral daily  atorvastatin 40 milliGRAM(s) Oral at bedtime  cefepime   IVPB 500 milliGRAM(s) IV Intermittent every 24 hours  fluticasone propionate 50 MICROgram(s)/spray Nasal Spray 1 Spray(s) Both Nostrils daily  gabapentin 300 milliGRAM(s) Oral daily  heparin   Injectable 5000 Unit(s) SubCutaneous every 12 hours  multivitamin 1 Tablet(s) Oral daily  vancomycin    Solution 125 milliGRAM(s) Oral every 6 hours    MEDICATIONS  (PRN):      CAPILLARY BLOOD GLUCOSE        I&O's Summary    17 May 2020 07:01  -  18 May 2020 07:00  --------------------------------------------------------  IN: 970 mL / OUT: 700 mL / NET: 270 mL        PHYSICAL EXAM:  Vital Signs Last 24 Hrs  T(C): 36.5 (18 May 2020 06:47), Max: 36.7 (17 May 2020 10:48)  T(F): 97.7 (18 May 2020 06:47), Max: 98 (17 May 2020 10:48)  HR: 99 (18 May 2020 06:47) (72 - 99)  BP: 125/66 (18 May 2020 06:47) (123/75 - 130/71)  BP(mean): --  RR: 18 (18 May 2020 06:47) (18 - 18)  SpO2: 95% (18 May 2020 06:47) (95% - 98%)    CONSTITUTIONAL: NAD, frail female, resting NC in place  RESPIRATORY: Normal respiratory effort; lungs are clear to auscultation bilaterally  CARDIOVASCULAR: irregular rate/rhythm, No lower extremity edema; Peripheral pulses are 2+ bilaterally  ABDOMEN: nontender to palpation, no rebound/guarding, active bowel sounds  PSYCH: A+O x3, affect appropriate    LABS:                        9.8    9.78  )-----------( 412      ( 18 May 2020 07:08 )             32.2     05-17    143  |  107  |  20  ----------------------------<  110<H>  4.0   |  26  |  1.03    Ca    9.4      17 May 2020 07:10  Phos  2.0     05-17  Mg     1.9     05-17                  RADIOLOGY & ADDITIONAL TESTS:  Results Reviewed:   Imaging Personally Reviewed:  Electrocardiogram Personally Reviewed:    COORDINATION OF CARE:  Care Discussed with Consultants/Other Providers [Y/N]:  Prior or Outpatient Records Reviewed [Y/N]:

## 2020-05-18 NOTE — PROGRESS NOTE ADULT - PROBLEM SELECTOR PLAN 4
Improving  - Creatinine 2.06 (from baseline .96), improving to 1.03   - Pre-renal etiology in setting of hypovolemia and infection   - Monitor BMP daily, monitor I's and O's   - Will do TOV Improving  - Creatinine 2.06 (from baseline .96), improving to 1   - Pre-renal etiology in setting of hypovolemia and infection   - Monitor BMP daily, monitor I's and O's   - Will do TOV

## 2020-05-19 NOTE — CHART NOTE - NSCHARTNOTEFT_GEN_A_CORE
Nutrition Follow Up Note  Patient seen for:  malnutrition follow up    Source: chart reviewed, events, labs noted  "89F w/ PMH HTN, HLD, spinal stenosis, MI, left hip replacement, recent sacral decub ulcers p/w failure to thrive and profuse diarrhea X 4 weeks admitted with sepsis 2/2 to c difficile and UTI."   noted improving   Diet : Regular with feeding assistance    Patient reports:     PO intake : today not eating lunch at all, refuses meal, refuses substitutes. Intake yesterday recorded as B, L, D 50%, 30%, 0%  per RN patient eats best at breakfast  with declining intake each meal throughout day. Patient accepts  mekhi x2  and currently Suplena x1 ( low protein renal formula)      Source for PO intake: RN, chart       Daily Weight in k ()  129.8  lbs  UBW= 155 lbs    Pertinent Medications: MEDICATIONS  (STANDING):  ascorbic acid 500 milliGRAM(s) Oral daily  aspirin enteric coated 81 milliGRAM(s) Oral daily  atorvastatin 40 milliGRAM(s) Oral at bedtime  fluticasone propionate 50 MICROgram(s)/spray Nasal Spray 1 Spray(s) Both Nostrils daily  gabapentin 300 milliGRAM(s) Oral daily  heparin   Injectable 5000 Unit(s) SubCutaneous every 12 hours  multivitamin 1 Tablet(s) Oral daily  vancomycin    Solution 125 milliGRAM(s) Oral every 6 hours    MEDICATIONS  (PRN):    Pertinent Labs:  @ 07:11: Na 142, BUN 16, Cr 0.86, BG 82, K+ 4.2, Phos 2.5, Mg 1.9, Alk Phos 161<H>, ALT/SGPT 24, AST/SGOT 40, HbA1c --          Skin per nursing documentation: multiple areas pressure breakdown: R heel, L heel DTI, L hip unstageable, sacrum unstageable  Edema:   GI BM x2, soft semi formed, no diarrhea    Estimated Needs:   [X ] no change since previous assessment  [ ] recalculated:     Previous Nutrition Diagnosis: severe Malnutrition  Nutrition Diagnosis is: ongoing, high calorie supplements, protein supplements       Recommend  1) plan to try ensure enlive for added protein  2) continue MVI, vitamin C  3) total assist with meals, hydration  4 Monitor/encourage PO intake.     Monitoring and Evaluation:     Continue to monitor Nutritional intake, Tolerance to diet prescription, weights, labs, skin integrity    RD remains available upon request and will follow up per protocol Nutrition Follow Up Note  Patient seen for:  malnutrition follow up    Source: chart reviewed, events, labs noted  "89F w/ PMH HTN, HLD, spinal stenosis, MI, left hip replacement, recent sacral decub ulcers p/w failure to thrive and profuse diarrhea X 4 weeks admitted with sepsis 2/2 to c difficile and UTI."   noted improving   Diet : Regular with feeding assistance    Patient reports:     PO intake : today not eating lunch at all, refuses meal, refuses substitutes. Intake yesterday recorded as B, L, D 50%, 30%, 0%  per RN patient eats best at breakfast  with declining intake each meal throughout day. Patient accepts  mekhi x2  and currently Suplena x1 ( low protein renal formula)      Source for PO intake: RN, chart       Daily Weight in k ()  129.8  lbs  UBW= 155 lbs    Pertinent Medications: MEDICATIONS  (STANDING):  ascorbic acid 500 milliGRAM(s) Oral daily  aspirin enteric coated 81 milliGRAM(s) Oral daily  atorvastatin 40 milliGRAM(s) Oral at bedtime  fluticasone propionate 50 MICROgram(s)/spray Nasal Spray 1 Spray(s) Both Nostrils daily  gabapentin 300 milliGRAM(s) Oral daily  heparin   Injectable 5000 Unit(s) SubCutaneous every 12 hours  multivitamin 1 Tablet(s) Oral daily  vancomycin    Solution 125 milliGRAM(s) Oral every 6 hours    MEDICATIONS  (PRN):    Pertinent Labs:  @ 07:11: Na 142, BUN 16, Cr 0.86, BG 82, K+ 4.2, Phos 2.5, Mg 1.9, Alk Phos 161<H>, ALT/SGPT 24, AST/SGOT 40, HbA1c --          Skin per nursing documentation: multiple areas pressure breakdown: R heel, L heel DTI, L hip unstageable, sacrum unstageable  Edema:   GI BM x2, soft semi formed, no diarrhea  Recommendations    1) plan for Suplena supplement x2 daily, mekhi x2 daily  2) continue MVI, vitamin C  3) total assist with meals, hydration  4 Monitor/encourage PO intake.     Monitoring and Evaluation:     Continue to monitor Nutritional intake, Tolerance to diet prescription, weights, labs, skin integrity    RD remains available upon request and will follow up per protocol

## 2020-05-19 NOTE — PROGRESS NOTE ADULT - PROBLEM SELECTOR PLAN 3
- UA positive, Urine culture positive for Enterobacter, resistant to CTX switched to Cefepime on 5/17  - Will treat for 3 days to minimize antibiotics exposure in the setting of C. diff colitis, today is day 3/3

## 2020-05-19 NOTE — PROGRESS NOTE ADULT - PROBLEM SELECTOR PLAN 9
DVT PPX: heparin sub q   Diet: Diet/ TLC w/ ensure  Dispo: pending PT eval  Code Status: DNR/DNI, no feeding tube, MOLST filled out

## 2020-05-19 NOTE — PROGRESS NOTE ADULT - PROBLEM SELECTOR PLAN 2
Resolved   - Severe sepsis, c/b hypotension. also tachypnea and leukocytosis  - Etiology mostly likely from c diff and UTI   - For C. diff colitis, continue with po Vancomycin   - For UTI, urine culture showing Enterobacter resistant to CTX, transitioned to Cefepime on (5/17-5/19) for 3 day course  - Leukocytosis now resolved

## 2020-05-19 NOTE — PROGRESS NOTE ADULT - PROBLEM SELECTOR PLAN 6
- Severe protein/ caloric malnutrition  - Nutrition consulted, continue with suplena for supplement  - Podiatry has evaluated pt, appreciate recs  - PT eval pending

## 2020-05-19 NOTE — PROGRESS NOTE ADULT - SUBJECTIVE AND OBJECTIVE BOX
Dalia Garvin, PGY-1  Internal Medicine  Pager: [phone], Q [phone]    PROGRESS NOTE:     Patient is a 89y old  Female who presents with a chief complaint of diarrhea, hypotension (18 May 2020 16:22)      SUBJECTIVE / OVERNIGHT EVENTS: No acute events overnight.     ADDITIONAL REVIEW OF SYSTEMS:    MEDICATIONS  (STANDING):  ascorbic acid 500 milliGRAM(s) Oral daily  aspirin enteric coated 81 milliGRAM(s) Oral daily  atorvastatin 40 milliGRAM(s) Oral at bedtime  fluticasone propionate 50 MICROgram(s)/spray Nasal Spray 1 Spray(s) Both Nostrils daily  gabapentin 300 milliGRAM(s) Oral daily  heparin   Injectable 5000 Unit(s) SubCutaneous every 12 hours  multivitamin 1 Tablet(s) Oral daily  vancomycin    Solution 125 milliGRAM(s) Oral every 6 hours    MEDICATIONS  (PRN):      CAPILLARY BLOOD GLUCOSE        I&O's Summary    18 May 2020 07:01  -  19 May 2020 07:00  --------------------------------------------------------  IN: 890 mL / OUT: 500 mL / NET: 390 mL        PHYSICAL EXAM:  Vital Signs Last 24 Hrs  T(C): 36.4 (19 May 2020 07:20), Max: 36.7 (18 May 2020 08:03)  T(F): 97.5 (19 May 2020 07:20), Max: 98.1 (19 May 2020 05:16)  HR: 98 (19 May 2020 07:20) (88 - 98)  BP: 129/70 (19 May 2020 07:20) (125/69 - 144/85)  BP(mean): --  RR: 18 (19 May 2020 07:20) (18 - 18)  SpO2: 94% (19 May 2020 07:20) (94% - 99%)    CONSTITUTIONAL: NAD, well-developed  RESPIRATORY: Normal respiratory effort; lungs are clear to auscultation bilaterally  CARDIOVASCULAR: Regular rate and rhythm, normal S1 and S2, no murmur/rub/gallop; No lower extremity edema; Peripheral pulses are 2+ bilaterally  ABDOMEN: Nontender to palpation, normoactive bowel sounds, no rebound/guarding; No hepatosplenomegaly  MUSCLOSKELETAL: no clubbing or cyanosis of digits; no joint swelling or tenderness to palpation  PSYCH: A+O to person, place, and time; affect appropriate    LABS:                        9.8    9.78  )-----------( 412      ( 18 May 2020 07:08 )             32.2     05-18    142  |  107  |  20  ----------------------------<  105<H>  4.0   |  26  |  1.00    Ca    9.5      18 May 2020 07:08  Phos  1.9     05-18  Mg     1.9     05-18    TPro  5.3<L>  /  Alb  2.1<L>  /  TBili  0.2  /  DBili  x   /  AST  28  /  ALT  19  /  AlkPhos  132<H>  05-18              Culture - Blood (collected 17 May 2020 12:11)  Source: .Blood Blood-Venous  Preliminary Report (18 May 2020 13:02):    No growth to date.    Culture - Blood (collected 17 May 2020 11:04)  Source: .Blood Blood-Peripheral  Preliminary Report (18 May 2020 12:01):    No growth to date.        RADIOLOGY & ADDITIONAL TESTS:  Results Reviewed:   Imaging Personally Reviewed:  Electrocardiogram Personally Reviewed:    COORDINATION OF CARE:  Care Discussed with Consultants/Other Providers [Y/N]:  Prior or Outpatient Records Reviewed [Y/N]: Dalia Garvin, PGY-1  Internal Medicine  Pager: [phone], K [phone]    PROGRESS NOTE:     Patient is a 89y old  Female who presents with a chief complaint of diarrhea, hypotension (18 May 2020 16:22)      SUBJECTIVE / OVERNIGHT EVENTS: No acute events overnight. Patient had 1 soft but more formed BM overnight, otherwise no complaints. Patient seen this AM, A&Ox3, stated she did not have any chest pain, shortness of breath, abdominal pain. Stated that she wanted to work with physical therapy and be more active. Otherwise will try a TOV today    ADDITIONAL REVIEW OF SYSTEMS: neg    MEDICATIONS  (STANDING):  ascorbic acid 500 milliGRAM(s) Oral daily  aspirin enteric coated 81 milliGRAM(s) Oral daily  atorvastatin 40 milliGRAM(s) Oral at bedtime  fluticasone propionate 50 MICROgram(s)/spray Nasal Spray 1 Spray(s) Both Nostrils daily  gabapentin 300 milliGRAM(s) Oral daily  heparin   Injectable 5000 Unit(s) SubCutaneous every 12 hours  multivitamin 1 Tablet(s) Oral daily  vancomycin    Solution 125 milliGRAM(s) Oral every 6 hours    MEDICATIONS  (PRN):      CAPILLARY BLOOD GLUCOSE        I&O's Summary    18 May 2020 07:01  -  19 May 2020 07:00  --------------------------------------------------------  IN: 890 mL / OUT: 500 mL / NET: 390 mL        PHYSICAL EXAM:  Vital Signs Last 24 Hrs  T(C): 36.4 (19 May 2020 07:20), Max: 36.7 (18 May 2020 08:03)  T(F): 97.5 (19 May 2020 07:20), Max: 98.1 (19 May 2020 05:16)  HR: 98 (19 May 2020 07:20) (88 - 98)  BP: 129/70 (19 May 2020 07:20) (125/69 - 144/85)  BP(mean): --  RR: 18 (19 May 2020 07:20) (18 - 18)  SpO2: 94% (19 May 2020 07:20) (94% - 99%)    CONSTITUTIONAL: NAD, frail female, resting on RA  RESPIRATORY: Normal respiratory effort; lungs are clear to auscultation bilaterally  CARDIOVASCULAR: irregular rate/rhythm, No lower extremity edema; Peripheral pulses are 2+ bilaterally  ABDOMEN: nontender to palpation, no rebound/guarding, active bowel sounds  PSYCH: A+O x3, affect appropriate      LABS:                        9.8    9.78  )-----------( 412      ( 18 May 2020 07:08 )             32.2     05-18    142  |  107  |  20  ----------------------------<  105<H>  4.0   |  26  |  1.00    Ca    9.5      18 May 2020 07:08  Phos  1.9     05-18  Mg     1.9     05-18    TPro  5.3<L>  /  Alb  2.1<L>  /  TBili  0.2  /  DBili  x   /  AST  28  /  ALT  19  /  AlkPhos  132<H>  05-18              Culture - Blood (collected 17 May 2020 12:11)  Source: .Blood Blood-Venous  Preliminary Report (18 May 2020 13:02):    No growth to date.    Culture - Blood (collected 17 May 2020 11:04)  Source: .Blood Blood-Peripheral  Preliminary Report (18 May 2020 12:01):    No growth to date.        RADIOLOGY & ADDITIONAL TESTS:  Results Reviewed:   Imaging Personally Reviewed:  Electrocardiogram Personally Reviewed:    COORDINATION OF CARE:  Care Discussed with Consultants/Other Providers [Y/N]:  Prior or Outpatient Records Reviewed [Y/N]:

## 2020-05-19 NOTE — PROGRESS NOTE ADULT - PROBLEM SELECTOR PLAN 4
Improving  - Creatinine 2.06 (from baseline .96), improving to 1   - Pre-renal etiology in setting of hypovolemia and infection   - Monitor BMP daily, monitor I's and O's   - Will do TOV Improving  - Creatinine 2.06 (from baseline .96), improving to 1   - Pre-renal etiology in setting of hypovolemia and infection   - Monitor BMP daily, monitor I's and O's   - Will do TOV today

## 2020-05-20 NOTE — DISCHARGE NOTE PROVIDER - NSDCFUADDINST_GEN_ALL_CORE_FT
Wound care instruction:  1.) topical therapy: sacral, Left trochanter, bilateral heel wounds- cleanse with NS, pat dry, apply cavilon skin protectant daily  2.) Maintain on an alternating air with low air loss surface until envella air fluidized surface available  3.) turn and reposition q 2 hours  4.) incontinence management - incontinence cleanser, pads, pericare BID and PRN for soilage  5.) Offload heels/feet with z-flex air fluidized boots

## 2020-05-20 NOTE — PROGRESS NOTE ADULT - PROBLEM SELECTOR PLAN 3
- UA positive, Urine culture positive for Enterobacter, resistant to CTX switched to Cefepime on 5/17, 3 day course completed - UA positive, Urine culture positive for Enterobacter, resistant to CTX switched to Cefepime on 5/17, 3 day course completed    #Elevated ALP  -most likely from cephalosporin, will continue to trend  -no symptoms

## 2020-05-20 NOTE — DISCHARGE NOTE PROVIDER - NSDCMRMEDTOKEN_GEN_ALL_CORE_FT
acetaminophen 650 mg oral tablet, extended release: 1 tab(s) orally every 8 hours, As Needed  amLODIPine 2.5 mg oral tablet: 1 tab(s) orally once a day  Aspirin Enteric Coated 81 mg oral delayed release tablet: 1 tab(s) orally once a day  atorvastatin 40 mg oral tablet: 1 tab(s) orally once a day  carvedilol 6.25 mg oral tablet: 1 tab(s) orally 2 times a day  cephalexin 500 mg oral capsule: 1 cap(s) orally once a day   (CHRONIC MED)  cetirizine 10 mg oral tablet: 1 tab(s) orally once a day  donepezil 10 mg oral tablet: 1 tab(s) orally once a day (at bedtime)  fiber gummies 2.5 g:   fluocinolone 0.01% otic solution: 1 drop(s) to both ears 2 times a day, As Needed  fluticasone 50 mcg/inh nasal spray: 1 spray(s) nasal once a day  gabapentin 300 mg oral capsule: 1 cap(s) orally 4 times a day  Multiple Vitamins oral tablet: 2 tab(s) orally once a day  olopatadine 0.1% ophthalmic solution: 1 drop(s) to each affected eye , As Needed  pantoprazole 40 mg oral delayed release tablet: 1 tab(s) orally once a day  potassium citrate 10 mEq oral tablet, extended release: 1 tab(s) orally once a day  torsemide 10 mg oral tablet: 0.5 tab(s) orally once a day  Vitamin D3 1000 intl units (25 mcg) oral tablet: 1 tab(s) orally once a day acetaminophen 650 mg oral tablet, extended release: 1 tab(s) orally every 8 hours, As Needed  amLODIPine 2.5 mg oral tablet: 1 tab(s) orally once a day  ascorbic acid 500 mg oral tablet: 1 tab(s) orally once a day  Aspirin Enteric Coated 81 mg oral delayed release tablet: 1 tab(s) orally once a day  atorvastatin 40 mg oral tablet: 1 tab(s) orally once a day  carvedilol 6.25 mg oral tablet: 1 tab(s) orally 2 times a day  cetirizine 10 mg oral tablet: 1 tab(s) orally once a day  fiber gummies 2.5 g:   fluocinolone 0.01% otic solution: 1 drop(s) to both ears 2 times a day, As Needed  fluticasone 50 mcg/inh nasal spray: 1 spray(s) nasal once a day  gabapentin 300 mg oral capsule: 1 cap(s) orally 4 times a day  Multiple Vitamins oral tablet: 2 tab(s) orally once a day  olopatadine 0.1% ophthalmic solution: 1 drop(s) to each affected eye , As Needed  torsemide 10 mg oral tablet: 0.5 tab(s) orally once a day  Vitamin D3 1000 intl units (25 mcg) oral tablet: 1 tab(s) orally once a day acetaminophen 650 mg oral tablet, extended release: 1 tab(s) orally every 8 hours, As Needed  amLODIPine 2.5 mg oral tablet: 1 tab(s) orally once a day  Aricept 10 mg oral tablet: 1 tab(s) orally once a day  ascorbic acid 500 mg oral tablet: 1 tab(s) orally once a day  Aspirin Enteric Coated 81 mg oral delayed release tablet: 1 tab(s) orally once a day  atorvastatin 40 mg oral tablet: 1 tab(s) orally once a day  carvedilol 6.25 mg oral tablet: 1 tab(s) orally 2 times a day  cetirizine 10 mg oral tablet: 1 tab(s) orally once a day  fiber gummies 2.5 g:   fluocinolone 0.01% otic solution: 1 drop(s) to both ears 2 times a day, As Needed  fluticasone 50 mcg/inh nasal spray: 1 spray(s) nasal once a day  gabapentin 300 mg oral capsule: 1 cap(s) orally 4 times a day  Multiple Vitamins oral tablet: 2 tab(s) orally once a day  olopatadine 0.1% ophthalmic solution: 1 drop(s) to each affected eye , As Needed  torsemide 10 mg oral tablet: 0.5 tab(s) orally once a day  Vitamin D3 1000 intl units (25 mcg) oral tablet: 1 tab(s) orally once a day

## 2020-05-20 NOTE — DISCHARGE NOTE PROVIDER - NSDCCPCAREPLAN_GEN_ALL_CORE_FT
PRINCIPAL DISCHARGE DIAGNOSIS  Diagnosis: C. difficile diarrhea  Assessment and Plan of Treatment: You were admitted to the hospital with profound diarrhea and you were found to have an infection with the bacteria C. difficile. You were treated with a 10 day course of antibiotics and your diarrhea improved. You should stop taking the daily Keflex that you have been taking for over a year to prevent another infection. If you have recurring diarrhea, fever, or experience any other concerning symptoms, please return to the hospital.      SECONDARY DISCHARGE DIAGNOSES  Diagnosis: Pulmonary mass  Assessment and Plan of Treatment: PRINCIPAL DISCHARGE DIAGNOSIS  Diagnosis: C. difficile diarrhea  Assessment and Plan of Treatment: You were admitted to the hospital with profound diarrhea and you were found to have an infection with the bacteria C. difficile. You were treated with a 10 day course of antibiotics and your diarrhea improved. You should stop taking the daily Keflex that you have been taking for over a year to prevent another infection. If you have recurring diarrhea, fever, or experience any other concerning symptoms, please return to the hospital.      SECONDARY DISCHARGE DIAGNOSES  Diagnosis: Acute cystitis without hematuria  Assessment and Plan of Treatment: While you were admitted to the hospital, you were found to have a urinary tract infection. You were treated with 3 days of IV antibiotics and your symptoms have resolved.    Diagnosis: Pulmonary mass  Assessment and Plan of Treatment: While you were in the hospital, your previously known pulmonary mass was found to be bigger than before. Per your family wishes, no further workup was done.

## 2020-05-20 NOTE — DISCHARGE NOTE PROVIDER - NSDCFUADDAPPT_GEN_ALL_CORE_FT
Please schedule an appointment for follow-up at the Wound Center located at [address] [place] [address]. [facility]. You can call [phone] to make an appointment. Please schedule an appointment for follow-up at the Wound Center located at [address] [place] [address]. You can call [phone] to make an appointment.    Please follow up with your primary care physician within 2 weeks of discharge from the hospital.

## 2020-05-20 NOTE — DISCHARGE NOTE PROVIDER - CARE PROVIDER_API CALL
Sam Ceron  INFECTIOUS DISEASE  [address] [address] [place], [place] [address]  Phone: [phone]  Fax: [phone]  Follow Up Time:

## 2020-05-20 NOTE — DISCHARGE NOTE PROVIDER - HOSPITAL COURSE
89F w/ PMH htn, hld, spinal stenosis, MI, left hip replacement, recent sacral decub ulcers p/w failure to thrive and profuse diarrhea X 4 weeks admitted with sepsis 2/2 to c difficile infection and concurrent UTI. Patient was treated with a 3 day course of cefepime for the UTI since there was resistance to CTX. Additionally, patient received a 10 day course of PO Vancomycin with resolution of her diarrhea. 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 After being evaluated by wound care, podiatry, and physical therapy, she was deemed stable for discharge to [facility]. 89F w/ PMH htn, hld, spinal stenosis, MI, left hip replacement, recent sacral decub ulcers p/w failure to thrive and profuse diarrhea X 4 weeks admitted with sepsis 2/2 to c difficile infection and concurrent UTI. Patient was treated with a 3 day course of cefepime for the UTI since there was resistance to CTX. Additionally, patient received a 10 day course of PO Vancomycin with resolution of her diarrhea. 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 Patient's home anti-hypertensives were added back slowly with patient tolerating them appropriately. After being evaluated by wound care, podiatry, and physical therapy, she was deemed stable for discharge to [facility]. 89F w/ PMH htn, hld, spinal stenosis, MI, left hip replacement, recent sacral decub ulcers p/w failure to thrive and profuse diarrhea X 4 weeks admitted with sepsis 2/2 to c difficile infection and concurrent UTI. Patient was treated with a 3 day course of cefepime for the UTI since there was resistance to CTX. Additionally, patient received a 10 day course of PO Vancomycin with resolution of her diarrhea. 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 Patient's home anti-hypertensives were added back slowly with patient tolerating them appropriately. After being evaluated by wound care, podiatry, and physical therapy, she was deemed stable for discharge to [facility]. 45 minutes spent discharging the patient.

## 2020-05-20 NOTE — PROGRESS NOTE ADULT - PROBLEM SELECTOR PLAN 1
Improving-1 BM in last 24 hours  - Patient with profuse diarrhea 7 X daily of watery stool in the setting of daily keflex  - C diff Positive, first episode of C. diff colitis  - Continue with Vancomycin po 125 mg q6 X 10 days  - Will hold home pantoprazole  given active c difficile infection and home donepezil given side effects of diarrhea Improving-1 BM in last 24 hours  - Patient with profuse diarrhea 7 X daily of watery stool in the setting of daily keflex  - C diff Positive, first episode of C. diff colitis  - Continue with Vancomycin po 125 mg q6 X 10 days  - Will hold home pantoprazole given active c difficile infection and home donepezil given side effects of diarrhea

## 2020-05-20 NOTE — PROGRESS NOTE ADULT - PROBLEM SELECTOR PLAN 9
DVT PPX: heparin sub q   Diet: Diet/ TLC w/ ensure  Dispo: pending PT recs  Code Status: DNR/DNI, no feeding tube, MOLST filled out

## 2020-05-20 NOTE — PROGRESS NOTE ADULT - ASSESSMENT
89F w/ PMH htn, hld, spinal stenosis, MI, left hip replacement, recent sacral decub ulcers p/w failure to thrive and profuse diarrhea X 4 weeks admitted with sepsis 2/2 to c difficile and UTI, now improving. 89F w/ PMH htn, hld, spinal stenosis, MI, left hip replacement, recent sacral decub ulcers p/w failure to thrive and profuse diarrhea X 4 weeks admitted with sepsis 2/2 to c difficile and UTI, resolved pending PT eval

## 2020-05-20 NOTE — PROGRESS NOTE ADULT - PROBLEM SELECTOR PLAN 4
Improving  - Creatinine 2.06 (from baseline .96), improving to 1   - Pre-renal etiology in setting of hypovolemia and infection   - Monitor BMP daily, monitor I's and O's   - TOV passed Improving  - Creatinine 2.06 (from baseline .96), improving to 0.86   - Pre-renal etiology in setting of hypovolemia and infection   - Monitor BMP daily, monitor I's and O's   - TOV passed

## 2020-05-20 NOTE — PROGRESS NOTE ADULT - SUBJECTIVE AND OBJECTIVE BOX
Dalia Garvin, PGY-1  Internal Medicine  Pager: [phone], B [phone]    PROGRESS NOTE:     Patient is a 89y old  Female who presents with a chief complaint of diarrhea, hypotension (19 May 2020 07:43)      SUBJECTIVE / OVERNIGHT EVENTS: Pt passed TOV overnight, otherwise no acute events    ADDITIONAL REVIEW OF SYSTEMS:    MEDICATIONS  (STANDING):  amLODIPine   Tablet 2.5 milliGRAM(s) Oral daily  ascorbic acid 500 milliGRAM(s) Oral daily  aspirin enteric coated 81 milliGRAM(s) Oral daily  atorvastatin 40 milliGRAM(s) Oral at bedtime  carvedilol 6.25 milliGRAM(s) Oral every 12 hours  fluticasone propionate 50 MICROgram(s)/spray Nasal Spray 1 Spray(s) Both Nostrils daily  gabapentin 300 milliGRAM(s) Oral daily  heparin   Injectable 5000 Unit(s) SubCutaneous every 12 hours  multivitamin 1 Tablet(s) Oral daily  vancomycin    Solution 125 milliGRAM(s) Oral every 6 hours    MEDICATIONS  (PRN):      CAPILLARY BLOOD GLUCOSE        I&O's Summary    19 May 2020 07:01  -  20 May 2020 07:00  --------------------------------------------------------  IN: 600 mL / OUT: 250 mL / NET: 350 mL        PHYSICAL EXAM:  Vital Signs Last 24 Hrs  T(C): 36.9 (20 May 2020 04:21), Max: 36.9 (20 May 2020 04:21)  T(F): 98.5 (20 May 2020 04:21), Max: 98.5 (20 May 2020 04:21)  HR: 98 (20 May 2020 04:21) (98 - 99)  BP: 154/67 (20 May 2020 04:21) (136/70 - 154/67)  BP(mean): --  RR: 17 (20 May 2020 04:21) (17 - 17)  SpO2: 95% (20 May 2020 04:21) (90% - 95%)    CONSTITUTIONAL: NAD, well-developed  RESPIRATORY: Normal respiratory effort; lungs are clear to auscultation bilaterally  CARDIOVASCULAR: Regular rate and rhythm, normal S1 and S2, no murmur/rub/gallop; No lower extremity edema; Peripheral pulses are 2+ bilaterally  ABDOMEN: Nontender to palpation, normoactive bowel sounds, no rebound/guarding; No hepatosplenomegaly  MUSCLOSKELETAL: no clubbing or cyanosis of digits; no joint swelling or tenderness to palpation  PSYCH: A+O to person, place, and time; affect appropriate    LABS:    05-19    142  |  107  |  16  ----------------------------<  82  4.2   |  26  |  0.86    Ca    9.7      19 May 2020 07:11  Phos  2.5     05-19  Mg     1.9     05-19    TPro  5.4<L>  /  Alb  2.1<L>  /  TBili  0.2  /  DBili  x   /  AST  40  /  ALT  24  /  AlkPhos  161<H>  05-19              Culture - Blood (collected 17 May 2020 12:11)  Source: .Blood Blood-Venous  Preliminary Report (18 May 2020 13:02):    No growth to date.    Culture - Blood (collected 17 May 2020 11:04)  Source: .Blood Blood-Peripheral  Preliminary Report (18 May 2020 12:01):    No growth to date.        RADIOLOGY & ADDITIONAL TESTS:  Results Reviewed:   Imaging Personally Reviewed:  Electrocardiogram Personally Reviewed:    COORDINATION OF CARE:  Care Discussed with Consultants/Other Providers [Y/N]:  Prior or Outpatient Records Reviewed [Y/N]: Dalia Garvin, PGY-1  Internal Medicine  Pager: [phone], M [phone]    PROGRESS NOTE:     Patient is a 89y old  Female who presents with a chief complaint of diarrhea, hypotension (19 May 2020 07:43)      SUBJECTIVE / OVERNIGHT EVENTS: Pt passed TOV overnight, otherwise no acute events. Patient seen this AM stated that she has no complaints, her diarrhea has resolved and had 1 soft formed BM overnight. Home antihypertensives were added back. Patient was alert and oriented x3    ADDITIONAL REVIEW OF SYSTEMS: denied chest pain, sob, abdominal pain    MEDICATIONS  (STANDING):  amLODIPine   Tablet 2.5 milliGRAM(s) Oral daily  ascorbic acid 500 milliGRAM(s) Oral daily  aspirin enteric coated 81 milliGRAM(s) Oral daily  atorvastatin 40 milliGRAM(s) Oral at bedtime  carvedilol 6.25 milliGRAM(s) Oral every 12 hours  fluticasone propionate 50 MICROgram(s)/spray Nasal Spray 1 Spray(s) Both Nostrils daily  gabapentin 300 milliGRAM(s) Oral daily  heparin   Injectable 5000 Unit(s) SubCutaneous every 12 hours  multivitamin 1 Tablet(s) Oral daily  vancomycin    Solution 125 milliGRAM(s) Oral every 6 hours    MEDICATIONS  (PRN):      CAPILLARY BLOOD GLUCOSE        I&O's Summary    19 May 2020 07:01  -  20 May 2020 07:00  --------------------------------------------------------  IN: 600 mL / OUT: 250 mL / NET: 350 mL        PHYSICAL EXAM:  Vital Signs Last 24 Hrs  T(C): 36.9 (20 May 2020 04:21), Max: 36.9 (20 May 2020 04:21)  T(F): 98.5 (20 May 2020 04:21), Max: 98.5 (20 May 2020 04:21)  HR: 98 (20 May 2020 04:21) (98 - 99)  BP: 154/67 (20 May 2020 04:21) (136/70 - 154/67)  BP(mean): --  RR: 17 (20 May 2020 04:21) (17 - 17)  SpO2: 95% (20 May 2020 04:21) (90% - 95%)    CONSTITUTIONAL: NAD, frail female, resting on RA  RESPIRATORY: Normal respiratory effort; lungs are clear to auscultation bilaterally  CARDIOVASCULAR: irregular rate/rhythm, No lower extremity edema; Peripheral pulses are 2+ bilaterally  ABDOMEN: nontender to palpation, no rebound/guarding, active bowel sounds  PSYCH: A+O x3, affect appropriate      LABS:    05-19    142  |  107  |  16  ----------------------------<  82  4.2   |  26  |  0.86    Ca    9.7      19 May 2020 07:11  Phos  2.5     05-19  Mg     1.9     05-19    TPro  5.4<L>  /  Alb  2.1<L>  /  TBili  0.2  /  DBili  x   /  AST  40  /  ALT  24  /  AlkPhos  161<H>  05-19              Culture - Blood (collected 17 May 2020 12:11)  Source: .Blood Blood-Venous  Preliminary Report (18 May 2020 13:02):    No growth to date.    Culture - Blood (collected 17 May 2020 11:04)  Source: .Blood Blood-Peripheral  Preliminary Report (18 May 2020 12:01):    No growth to date.        RADIOLOGY & ADDITIONAL TESTS:  Results Reviewed:   Imaging Personally Reviewed:  Electrocardiogram Personally Reviewed:    COORDINATION OF CARE:  Care Discussed with Consultants/Other Providers [Y/N]:  Prior or Outpatient Records Reviewed [Y/N]:

## 2020-05-20 NOTE — PROGRESS NOTE ADULT - PROBLEM SELECTOR PLAN 8
-110/50-60 at admission, antihypertensives were held  -Patient HDS and hypertensive so amoldipine 2.5 and coreg 6.25 BID added back on 5/20 w/ hold parameters

## 2020-05-21 NOTE — PROGRESS NOTE ADULT - SUBJECTIVE AND OBJECTIVE BOX
Podiatry pager #: [phone]/ [phone]    Patient is a 89y old  Female who presents with a chief complaint of diarrhea, hypotension (21 May 2020 07:33) Podiatry seen today for f/u bilateral heel wounds       INTERVAL HPI/OVERNIGHT EVENTS:  Patient seen and evaluated at bedside.  Pt is resting comfortable in NAD. Denies N/V/F/C.  Pain rated at X/10    Allergies    penicillin (Unknown)    Intolerances        Vital Signs Last 24 Hrs  T(C): 36.8 (21 May 2020 04:34), Max: 36.8 (21 May 2020 04:34)  T(F): 98.2 (21 May 2020 04:34), Max: 98.2 (21 May 2020 04:34)  HR: 98 (21 May 2020 04:34) (95 - 109)  BP: 108/68 (21 May 2020 04:34) (108/68 - 160/92)  BP(mean): --  RR: 18 (21 May 2020 04:34) (18 - 18)  SpO2: 97% (21 May 2020 04:34) (91% - 97%)    amLODIPine   Tablet 2.5 milliGRAM(s) Oral daily  ascorbic acid 500 milliGRAM(s) Oral daily  aspirin enteric coated 81 milliGRAM(s) Oral daily  atorvastatin 40 milliGRAM(s) Oral at bedtime  carvedilol 6.25 milliGRAM(s) Oral every 12 hours  fluticasone propionate 50 MICROgram(s)/spray Nasal Spray 1 Spray(s) Both Nostrils daily  gabapentin 300 milliGRAM(s) Oral daily  heparin   Injectable 5000 Unit(s) SubCutaneous every 12 hours  multivitamin 1 Tablet(s) Oral daily  vancomycin    Solution 125 milliGRAM(s) Oral every 6 hours      LABS:    05-20    139  |  105  |  14  ----------------------------<  77  5.1   |  22  |  0.77    Ca    9.6      20 May 2020 07:04  Phos  2.1     05-20  Mg     2.0     05-20    TPro  5.6<L>  /  Alb  1.8<L>  /  TBili  0.3  /  DBili  x   /  AST  46<H>  /  ALT  25  /  AlkPhos  184<H>  05-20        CAPILLARY BLOOD GLUCOSE          Lower Extremity Physical Exam:    Vascular: DP/PT _1/4, B/L, CFT <2_ seconds B/L, Temperature gradient wnl_, B/L.   Neuro: Epicritic sensation _intact to the level of _toes, B/L.  Musculoskeletal/Ortho:  Skin:  Wound #1: heels  Location: bilateral plantar aspect  Size: 2cmm diameter  Depth:2mm  Wound bed: granular  with mild eschar after ruptured bullae  Drainage: none  Odor: none  Periwound:mimimal erythema  Etiology: bullae  RADIOLOGY & ADDITIONAL TESTS:

## 2020-05-21 NOTE — PROGRESS NOTE ADULT - SUBJECTIVE AND OBJECTIVE BOX
Dalia Garvin, PGY-1  Internal Medicine  Pager: [phone], J [phone]    PROGRESS NOTE:     Patient is a 89y old  Female who presents with a chief complaint of diarrhea, hypotension (20 May 2020 18:25)      SUBJECTIVE / OVERNIGHT EVENTS: No acute events overnight    ADDITIONAL REVIEW OF SYSTEMS:    MEDICATIONS  (STANDING):  amLODIPine   Tablet 2.5 milliGRAM(s) Oral daily  ascorbic acid 500 milliGRAM(s) Oral daily  aspirin enteric coated 81 milliGRAM(s) Oral daily  atorvastatin 40 milliGRAM(s) Oral at bedtime  carvedilol 6.25 milliGRAM(s) Oral every 12 hours  fluticasone propionate 50 MICROgram(s)/spray Nasal Spray 1 Spray(s) Both Nostrils daily  gabapentin 300 milliGRAM(s) Oral daily  heparin   Injectable 5000 Unit(s) SubCutaneous every 12 hours  multivitamin 1 Tablet(s) Oral daily  vancomycin    Solution 125 milliGRAM(s) Oral every 6 hours    MEDICATIONS  (PRN):      CAPILLARY BLOOD GLUCOSE        I&O's Summary    20 May 2020 07:01  -  21 May 2020 07:00  --------------------------------------------------------  IN: 900 mL / OUT: 600 mL / NET: 300 mL        PHYSICAL EXAM:  Vital Signs Last 24 Hrs  T(C): 36.8 (21 May 2020 04:34), Max: 36.8 (21 May 2020 04:34)  T(F): 98.2 (21 May 2020 04:34), Max: 98.2 (21 May 2020 04:34)  HR: 98 (21 May 2020 04:34) (95 - 109)  BP: 108/68 (21 May 2020 04:34) (108/68 - 160/92)  BP(mean): --  RR: 18 (21 May 2020 04:34) (18 - 18)  SpO2: 97% (21 May 2020 04:34) (91% - 97%)    CONSTITUTIONAL: NAD, well-developed  RESPIRATORY: Normal respiratory effort; lungs are clear to auscultation bilaterally  CARDIOVASCULAR: Regular rate and rhythm, normal S1 and S2, no murmur/rub/gallop; No lower extremity edema; Peripheral pulses are 2+ bilaterally  ABDOMEN: Nontender to palpation, normoactive bowel sounds, no rebound/guarding; No hepatosplenomegaly  MUSCLOSKELETAL: no clubbing or cyanosis of digits; no joint swelling or tenderness to palpation  PSYCH: A+O to person, place, and time; affect appropriate    LABS:    05-20    139  |  105  |  14  ----------------------------<  77  5.1   |  22  |  0.77    Ca    9.6      20 May 2020 07:04  Phos  2.1     05-20  Mg     2.0     05-20    TPro  5.6<L>  /  Alb  1.8<L>  /  TBili  0.3  /  DBili  x   /  AST  46<H>  /  ALT  25  /  AlkPhos  184<H>  05-20                RADIOLOGY & ADDITIONAL TESTS:  Results Reviewed:   Imaging Personally Reviewed:  Electrocardiogram Personally Reviewed:    COORDINATION OF CARE:  Care Discussed with Consultants/Other Providers [Y/N]:  Prior or Outpatient Records Reviewed [Y/N]: Dalia Garvin, PGY-1  Internal Medicine  Pager: [phone], H [phone]    PROGRESS NOTE:     Patient is a 89y old  Female who presents with a chief complaint of diarrhea, hypotension (20 May 2020 18:25)      SUBJECTIVE / OVERNIGHT EVENTS: No acute events overnight. Patient seen this AM, no complaints. Stated that she is not very hungry, but denies general pain, chest pain, shortness of breath or abdominal pain. Diarrhea has resolved. Patient pending d/c to [facility]    ADDITIONAL REVIEW OF SYSTEMS: neg unless noted above    MEDICATIONS  (STANDING):  amLODIPine   Tablet 2.5 milliGRAM(s) Oral daily  ascorbic acid 500 milliGRAM(s) Oral daily  aspirin enteric coated 81 milliGRAM(s) Oral daily  atorvastatin 40 milliGRAM(s) Oral at bedtime  carvedilol 6.25 milliGRAM(s) Oral every 12 hours  fluticasone propionate 50 MICROgram(s)/spray Nasal Spray 1 Spray(s) Both Nostrils daily  gabapentin 300 milliGRAM(s) Oral daily  heparin   Injectable 5000 Unit(s) SubCutaneous every 12 hours  multivitamin 1 Tablet(s) Oral daily  vancomycin    Solution 125 milliGRAM(s) Oral every 6 hours    MEDICATIONS  (PRN):      CAPILLARY BLOOD GLUCOSE        I&O's Summary    20 May 2020 07:01  -  21 May 2020 07:00  --------------------------------------------------------  IN: 900 mL / OUT: 600 mL / NET: 300 mL        PHYSICAL EXAM:  Vital Signs Last 24 Hrs  T(C): 36.8 (21 May 2020 04:34), Max: 36.8 (21 May 2020 04:34)  T(F): 98.2 (21 May 2020 04:34), Max: 98.2 (21 May 2020 04:34)  HR: 98 (21 May 2020 04:34) (95 - 109)  BP: 108/68 (21 May 2020 04:34) (108/68 - 160/92)  BP(mean): --  RR: 18 (21 May 2020 04:34) (18 - 18)  SpO2: 97% (21 May 2020 04:34) (91% - 97%)    CONSTITUTIONAL: NAD, frail female, resting on RA  RESPIRATORY: Normal respiratory effort; lungs are clear to auscultation bilaterally  CARDIOVASCULAR: irregular rate/rhythm, No lower extremity edema; Peripheral pulses are 2+ bilaterally  ABDOMEN: nontender to palpation, no rebound/guarding, active bowel sounds  PSYCH: A+O x3, affect appropriate    LABS:    05-20    139  |  105  |  14  ----------------------------<  77  5.1   |  22  |  0.77    Ca    9.6      20 May 2020 07:04  Phos  2.1     05-20  Mg     2.0     05-20    TPro  5.6<L>  /  Alb  1.8<L>  /  TBili  0.3  /  DBili  x   /  AST  46<H>  /  ALT  25  /  AlkPhos  184<H>  05-20                RADIOLOGY & ADDITIONAL TESTS:  Results Reviewed:   Imaging Personally Reviewed:  Electrocardiogram Personally Reviewed:    COORDINATION OF CARE:  Care Discussed with Consultants/Other Providers [Y/N]:  Prior or Outpatient Records Reviewed [Y/N]:

## 2020-05-21 NOTE — PROGRESS NOTE ADULT - PROBLEM SELECTOR PLAN 9
DVT PPX: heparin sub q   Diet: Diet/ TLC w/ ensure  Dispo: pending PT recs  Code Status: DNR/DNI, no feeding tube, MOLST filled out DVT PPX: heparin sub q   Diet: Diet/ TLC w/ ensure  Dispo: [facility]  Code Status: DNR/DNI, no feeding tube, MOLST filled out

## 2020-05-21 NOTE — PROGRESS NOTE ADULT - PROBLEM SELECTOR PLAN 4
Improving  - Creatinine 2.06 (from baseline .96), improving to 0.86   - Pre-renal etiology in setting of hypovolemia and infection   - Monitor BMP daily, monitor I's and O's   - TOV passed Improving  - Creatinine 2.06 (from baseline .96), improving to 0.43   - Pre-renal etiology in setting of hypovolemia and infection   - Monitor BMP daily, monitor I's and O's   - TOV passed

## 2020-05-21 NOTE — PROGRESS NOTE ADULT - ASSESSMENT
89F w/ PMH htn, hld, spinal stenosis, MI, left hip replacement, recent sacral decub ulcers p/w failure to thrive and profuse diarrhea X 4 weeks admitted with sepsis 2/2 to c difficile and UTI, resolved pending PT eval 89F w/ PMH htn, hld, spinal stenosis, MI, left hip replacement, recent sacral decub ulcers p/w failure to thrive and profuse diarrhea X 4 weeks admitted with sepsis 2/2 to c difficile and UTI, resolved pending dispo

## 2020-05-21 NOTE — PROGRESS NOTE ADULT - PROBLEM SELECTOR PLAN 6
- Severe protein/ caloric malnutrition  - Nutrition consulted, continue with suplena for supplement  - Podiatry has evaluated pt, appreciate recs  - PT eval pending - Severe protein/ caloric malnutrition  - Nutrition consulted, continue with suplena for supplement  - Podiatry has evaluated pt, appreciate recs  - PT eval rec [facility]

## 2020-05-21 NOTE — PROGRESS NOTE ADULT - PROBLEM SELECTOR PLAN 3
- UA positive, Urine culture positive for Enterobacter, resistant to CTX switched to Cefepime on 5/17, 3 day course completed    #Elevated ALP  -most likely from cephalosporin, will continue to trend  -no symptoms

## 2020-05-21 NOTE — PROGRESS NOTE ADULT - ASSESSMENT
· Assessment		  Bilateral superfical heel wounds --non infected  -- Pt seen and evaluated bedside  -- change to normal saline cleanse with santyl/allevyne foam every other day  -- continue with  z offloading heel boots  --will montior for infection and continued wound care management

## 2020-05-21 NOTE — PROGRESS NOTE ADULT - PROBLEM SELECTOR PLAN 1
Improving-1 BM in last 24 hours  - Patient with profuse diarrhea 7 X daily of watery stool in the setting of daily keflex  - C diff Positive, first episode of C. diff colitis  - Continue with Vancomycin po 125 mg q6 X 10 days  - Will hold home pantoprazole given active c difficile infection and home donepezil given side effects of diarrhea Improving-1 BM in last 24 hours  - Patient with profuse diarrhea 7 X daily of watery stool in the setting of daily keflex  - C diff Positive, first episode of C. diff colitis  - Continue with Vancomycin po 125 mg q6 X 10 days  - Will hold home pantoprazole given active c difficile infection and home donepezil given side effects of diarrhea  - will d/c patient without daily keflex

## 2020-05-22 NOTE — PROGRESS NOTE ADULT - SUBJECTIVE AND OBJECTIVE BOX
CHIEF COMPLAINT: diarrhea, hypotension    Interval Events: No acute event overnight. Patient had 3 BM over the past 24 hours. Patient is seen and examined at the bedside this morning. Denies fever or chills. No chest pain or shortness of breath.        OBJECTIVE:  Vital Signs Last 24 Hrs  T(C): 36.7 (22 May 2020 04:26), Max: 36.7 (21 May 2020 12:00)  T(F): 98 (22 May 2020 04:26), Max: 98 (21 May 2020 12:00)  HR: 82 (22 May 2020 04:26) (75 - 94)  BP: 125/70 (22 May 2020 04:26) (118/72 - 153/78)  BP(mean): --  RR: 16 (22 May 2020 04:26) (16 - 18)  SpO2: 95% (22 May 2020 04:26) (83% - 97%)    05-21 @ 07:01  -  05-22 @ 07:00  --------------------------------------------------------  IN: 520 mL / OUT: 200 mL / NET: 320 mL      CAPILLARY BLOOD GLUCOSE          PHYSICAL EXAM:  CONSTITUTIONAL: NAD, frail female, resting on RA  RESPIRATORY: Normal respiratory effort; lungs are clear to auscultation bilaterally  CARDIOVASCULAR: irregular rate/rhythm, No lower extremity edema; Peripheral pulses are 2+ bilaterally  ABDOMEN: nontender to palpation, no rebound/guarding, active bowel sounds  PSYCH: A+O x3, affect appropriate         LINES:    HOSPITAL MEDICATIONS:  aspirin enteric coated 81 milliGRAM(s) Oral daily  heparin   Injectable 5000 Unit(s) SubCutaneous every 12 hours    vancomycin    Solution 125 milliGRAM(s) Oral every 6 hours    amLODIPine   Tablet 2.5 milliGRAM(s) Oral daily  carvedilol 6.25 milliGRAM(s) Oral every 12 hours    atorvastatin 40 milliGRAM(s) Oral at bedtime      gabapentin 300 milliGRAM(s) Oral daily          ascorbic acid 500 milliGRAM(s) Oral daily  multivitamin 1 Tablet(s) Oral daily      fluticasone propionate 50 MICROgram(s)/spray Nasal Spray 1 Spray(s) Both Nostrils daily        LABS:    Hgb Trend: 9.8<--, 9.3<--, 9.0<--        Creatinine Trend: 0.77<--, 0.86<--, 1.00<--, 1.03<--, 1.35<--, 1.68<--

## 2020-05-22 NOTE — PROGRESS NOTE ADULT - ASSESSMENT
89F w/ PMH htn, hld, spinal stenosis, MI, left hip replacement, recent sacral decub ulcers p/w failure to thrive and profuse diarrhea X 4 weeks admitted with sepsis 2/2 to c difficile and UTI, resolved pending dispo.

## 2020-05-22 NOTE — PROGRESS NOTE ADULT - PROBLEM SELECTOR PLAN 8
-110/50-60 at admission, antihypertensives were held  -Patient HDS and hypertensive so Amlodipine 2.5 and coreg 6.25 BID added back on 5/20 w/ hold parameters

## 2020-05-22 NOTE — PROGRESS NOTE ADULT - PROBLEM SELECTOR PLAN 6
- Severe protein/ caloric malnutrition  - Nutrition consulted, continue with suplena for supplement  - Podiatry has evaluated pt, appreciate recs  - PT eval rec [facility]

## 2020-05-22 NOTE — PROGRESS NOTE ADULT - PROBLEM SELECTOR PLAN 4
Improving  - Creatinine 2.06 (from baseline .96), improving to 0.43   - Pre-renal etiology in setting of hypovolemia and infection   - Monitor BMP daily, monitor I's and O's   - TOV passed

## 2020-05-22 NOTE — PROGRESS NOTE ADULT - PROBLEM SELECTOR PLAN 9
DVT PPX: heparin sub q   Diet: Diet/ TLC w/ ensure  Dispo: [facility]  Code Status: DNR/DNI, no feeding tube, MOLST filled out

## 2020-05-22 NOTE — PROGRESS NOTE ADULT - PROBLEM SELECTOR PLAN 1
Improving-3 BM in last 24 hours  - Patient with profuse diarrhea 7 X daily of watery stool in the setting of daily keflex  - C diff Positive, first episode of C. diff colitis  - Continue with Vancomycin po 125 mg q6 X 10 days  - Will hold home pantoprazole given active c difficile infection and home donepezil given side effects of diarrhea  - will d/c patient without daily keflex (spoke with patient's outpatient ID Dr. Ceron)

## 2020-05-23 NOTE — PROGRESS NOTE ADULT - SUBJECTIVE AND OBJECTIVE BOX
Dalia Garvin, PGY-1  Internal Medicine  Pager: [phone], T [phone]    PROGRESS NOTE:     Patient is a 89y old  Female who presents with a chief complaint of diarrhea, hypotension (22 May 2020 07:38)      SUBJECTIVE / OVERNIGHT EVENTS: No acute events overnight. Patient has had 3 BMs in last 24 hours.     ADDITIONAL REVIEW OF SYSTEMS:    MEDICATIONS  (STANDING):  amLODIPine   Tablet 2.5 milliGRAM(s) Oral daily  ascorbic acid 500 milliGRAM(s) Oral daily  aspirin enteric coated 81 milliGRAM(s) Oral daily  atorvastatin 40 milliGRAM(s) Oral at bedtime  carvedilol 6.25 milliGRAM(s) Oral every 12 hours  fluticasone propionate 50 MICROgram(s)/spray Nasal Spray 1 Spray(s) Both Nostrils daily  gabapentin 300 milliGRAM(s) Oral daily  heparin   Injectable 5000 Unit(s) SubCutaneous every 12 hours  multivitamin 1 Tablet(s) Oral daily  vancomycin    Solution 125 milliGRAM(s) Oral every 6 hours    MEDICATIONS  (PRN):      CAPILLARY BLOOD GLUCOSE        I&O's Summary    22 May 2020 07:01  -  23 May 2020 07:00  --------------------------------------------------------  IN: 400 mL / OUT: 0 mL / NET: 400 mL        PHYSICAL EXAM:  Vital Signs Last 24 Hrs  T(C): 36.4 (23 May 2020 04:18), Max: 36.4 (22 May 2020 21:56)  T(F): 97.6 (23 May 2020 04:18), Max: 97.6 (23 May 2020 04:18)  HR: 80 (23 May 2020 04:18) (71 - 88)  BP: 135/72 (23 May 2020 04:18) (131/72 - 167/77)  BP(mean): --  RR: 17 (23 May 2020 04:18) (17 - 18)  SpO2: 97% (23 May 2020 04:18) (91% - 97%)    CONSTITUTIONAL: NAD, well-developed  RESPIRATORY: Normal respiratory effort; lungs are clear to auscultation bilaterally  CARDIOVASCULAR: Regular rate and rhythm, normal S1 and S2, no murmur/rub/gallop; No lower extremity edema; Peripheral pulses are 2+ bilaterally  ABDOMEN: Nontender to palpation, normoactive bowel sounds, no rebound/guarding; No hepatosplenomegaly  MUSCLOSKELETAL: no clubbing or cyanosis of digits; no joint swelling or tenderness to palpation  PSYCH: A+O to person, place, and time; affect appropriate    LABS:    05-22    141  |  107  |  13  ----------------------------<  89  4.1   |  26  |  0.79    Ca    9.6      22 May 2020 07:11  Phos  2.6     05-22  Mg     1.9     05-22    TPro  5.3<L>  /  Alb  2.1<L>  /  TBili  0.2  /  DBili  x   /  AST  28  /  ALT  18  /  AlkPhos  144<H>  05-22                RADIOLOGY & ADDITIONAL TESTS:  Results Reviewed:   Imaging Personally Reviewed:  Electrocardiogram Personally Reviewed:    COORDINATION OF CARE:  Care Discussed with Consultants/Other Providers [Y/N]:  Prior or Outpatient Records Reviewed [Y/N]: Dalia Garvin, PGY-1  Internal Medicine  Pager: [phone], U [phone]    PROGRESS NOTE:     Patient is a 89y old  Female who presents with a chief complaint of diarrhea, hypotension (22 May 2020 07:38)      SUBJECTIVE / OVERNIGHT EVENTS: No acute events overnight. Patient has had 3 BMs in last 24 hours. No complaints this AM, status quo.     ADDITIONAL REVIEW OF SYSTEMS: neg     MEDICATIONS  (STANDING):  amLODIPine   Tablet 2.5 milliGRAM(s) Oral daily  ascorbic acid 500 milliGRAM(s) Oral daily  aspirin enteric coated 81 milliGRAM(s) Oral daily  atorvastatin 40 milliGRAM(s) Oral at bedtime  carvedilol 6.25 milliGRAM(s) Oral every 12 hours  fluticasone propionate 50 MICROgram(s)/spray Nasal Spray 1 Spray(s) Both Nostrils daily  gabapentin 300 milliGRAM(s) Oral daily  heparin   Injectable 5000 Unit(s) SubCutaneous every 12 hours  multivitamin 1 Tablet(s) Oral daily  vancomycin    Solution 125 milliGRAM(s) Oral every 6 hours    MEDICATIONS  (PRN):      CAPILLARY BLOOD GLUCOSE        I&O's Summary    22 May 2020 07:01  -  23 May 2020 07:00  --------------------------------------------------------  IN: 400 mL / OUT: 0 mL / NET: 400 mL        PHYSICAL EXAM:  Vital Signs Last 24 Hrs  T(C): 36.4 (23 May 2020 04:18), Max: 36.4 (22 May 2020 21:56)  T(F): 97.6 (23 May 2020 04:18), Max: 97.6 (23 May 2020 04:18)  HR: 80 (23 May 2020 04:18) (71 - 88)  BP: 135/72 (23 May 2020 04:18) (131/72 - 167/77)  BP(mean): --  RR: 17 (23 May 2020 04:18) (17 - 18)  SpO2: 97% (23 May 2020 04:18) (91% - 97%)    CONSTITUTIONAL: NAD, frail female, resting on RA  RESPIRATORY: Normal respiratory effort; lungs are clear to auscultation bilaterally  CARDIOVASCULAR: irregular rate/rhythm, No lower extremity edema; Peripheral pulses are 2+ bilaterally  ABDOMEN: nontender to palpation, no rebound/guarding, active bowel sounds  PSYCH: A+O x3, affect appropriate    LABS:    05-22    141  |  107  |  13  ----------------------------<  89  4.1   |  26  |  0.79    Ca    9.6      22 May 2020 07:11  Phos  2.6     05-22  Mg     1.9     05-22    TPro  5.3<L>  /  Alb  2.1<L>  /  TBili  0.2  /  DBili  x   /  AST  28  /  ALT  18  /  AlkPhos  144<H>  05-22                RADIOLOGY & ADDITIONAL TESTS:  Results Reviewed:   Imaging Personally Reviewed:  Electrocardiogram Personally Reviewed:    COORDINATION OF CARE:  Care Discussed with Consultants/Other Providers [Y/N]:  Prior or Outpatient Records Reviewed [Y/N]:

## 2020-05-23 NOTE — PROGRESS NOTE ADULT - PROBLEM SELECTOR PLAN 9
DVT PPX: heparin sub q   Diet: Regular w/ Gerhard and Suplena  Dispo: [facility]  Code Status: DNR/DNI, no feeding tube, MOLST filled out DVT PPX: heparin sub q   Diet: Regular w/ Gerhard and Suplena  Dispo: [facility]--will need COVID swab within 48 hours of discharge to [facility]  Code Status: DNR/DNI, no feeding tube, MOLST filled out

## 2020-05-23 NOTE — PROGRESS NOTE ADULT - PROBLEM SELECTOR PLAN 2
----- Message from Yevgeniy Narvaez NP sent at 2/1/2017  9:48 AM CST -----  Call with negative urine culture results Resolved   - Severe sepsis, c/b hypotension. also tachypnea and leukocytosis  - Etiology mostly likely from c diff and UTI   - For C. diff colitis, continue with po Vancomycin   - For UTI, urine culture showing Enterobacter resistant to CTX, transitioned to Cefepime on (5/17-5/19) for 3 day course  - Leukocytosis now resolved

## 2020-05-23 NOTE — PROGRESS NOTE ADULT - PROBLEM SELECTOR PLAN 3
- UA positive, Urine culture positive for Enterobacter, resistant to CTX switched to Cefepime on 5/17, 3 day course completed    #Elevated ALP  -most likely from cephalosporin, will continue to trend  -no symptoms - UA positive, Urine culture positive for Enterobacter, resistant to CTX switched to Cefepime on 5/17, 3 day course completed    #Elevated ALP  -most likely from cephalosporin  -no symptoms

## 2020-05-24 NOTE — PROGRESS NOTE ADULT - PROBLEM SELECTOR PLAN 7
- Elevated troponin on admission likely in setting of demand ischemia, downtrending on repeat  -no need to trend further  - EKG with sinus arrythmia with anterior q waves

## 2020-05-24 NOTE — PROGRESS NOTE ADULT - PROBLEM SELECTOR PLAN 9
DVT PPX: heparin sub q   Diet: Regular w/ Gerhard and Suplena  Dispo: [facility]--will need COVID swab within 48 hours of discharge to [facility]  Code Status: DNR/DNI, no feeding tube, MOLST filled out

## 2020-05-24 NOTE — PROGRESS NOTE ADULT - PROBLEM SELECTOR PLAN 1
Improving-1 formed BM in last 24 hours  - Patient presented with profuse diarrhea 7 X daily of watery stool in the setting of daily keflex  - C diff Positive, first episode of C. diff colitis  - Continue with Vancomycin po 125 mg q6 X 10 days, today is day 10/10  - Will hold home pantoprazole given active c difficile infection and home donepezil given side effects of diarrhea  - will d/c patient without daily keflex (spoke with patient's outpatient ID Dr. Ceron)

## 2020-05-24 NOTE — PROGRESS NOTE ADULT - PROBLEM SELECTOR PLAN 8
-110/50-60 at admission, antihypertensives were initially held  -Patient HDS and hypertensive so Amlodipine 2.5 and coreg 6.25 BID added back on 5/20 w/ hold parameters  - patient persistently hypertensive to the 160s on 5/24 so home torsemide 5mg added back; will need to check lytes in AM

## 2020-05-24 NOTE — PROGRESS NOTE ADULT - SUBJECTIVE AND OBJECTIVE BOX
Dalia Garvin, PGY-1  Internal Medicine  Pager: [phone], F [phone]    PROGRESS NOTE:     Patient is a 89y old  Female who presents with a chief complaint of diarrhea, hypotension (23 May 2020 08:13)      SUBJECTIVE / OVERNIGHT EVENTS: Patient noted to be hypertensive to the 160s in the last 24 hours, added back home torsemide, will check lytes tomorrow. Patient plan for dispo per family discussion yesterday was d/c on Tuesday AM, will need COVID swab prior. Otherwise no acute events overnight, pt had 1 BM.     ADDITIONAL REVIEW OF SYSTEMS:    MEDICATIONS  (STANDING):  amLODIPine   Tablet 2.5 milliGRAM(s) Oral daily  ascorbic acid 500 milliGRAM(s) Oral daily  aspirin enteric coated 81 milliGRAM(s) Oral daily  atorvastatin 40 milliGRAM(s) Oral at bedtime  carvedilol 6.25 milliGRAM(s) Oral every 12 hours  fluticasone propionate 50 MICROgram(s)/spray Nasal Spray 1 Spray(s) Both Nostrils daily  gabapentin 300 milliGRAM(s) Oral daily  heparin   Injectable 5000 Unit(s) SubCutaneous every 12 hours  multivitamin 1 Tablet(s) Oral daily  torsemide 5 milliGRAM(s) Oral daily  vancomycin    Solution 125 milliGRAM(s) Oral every 6 hours    MEDICATIONS  (PRN):      CAPILLARY BLOOD GLUCOSE      POCT Blood Glucose.: 79 mg/dL (23 May 2020 08:31)    I&O's Summary    23 May 2020 07:01  -  24 May 2020 07:00  --------------------------------------------------------  IN: 690 mL / OUT: 0 mL / NET: 690 mL        PHYSICAL EXAM:  Vital Signs Last 24 Hrs  T(C): 36.3 (24 May 2020 06:14), Max: 36.6 (23 May 2020 23:20)  T(F): 97.3 (24 May 2020 06:14), Max: 97.9 (23 May 2020 23:20)  HR: 82 (24 May 2020 06:14) (72 - 84)  BP: 163/74 (24 May 2020 06:14) (158/70 - 168/77)  BP(mean): --  RR: 18 (24 May 2020 06:14) (18 - 18)  SpO2: 94% (24 May 2020 06:15) (90% - 98%)    CONSTITUTIONAL: NAD, well-developed  RESPIRATORY: Normal respiratory effort; lungs are clear to auscultation bilaterally  CARDIOVASCULAR: Regular rate and rhythm, normal S1 and S2, no murmur/rub/gallop; No lower extremity edema; Peripheral pulses are 2+ bilaterally  ABDOMEN: Nontender to palpation, normoactive bowel sounds, no rebound/guarding; No hepatosplenomegaly  MUSCLOSKELETAL: no clubbing or cyanosis of digits; no joint swelling or tenderness to palpation  PSYCH: A+O to person, place, and time; affect appropriate    LABS:                      RADIOLOGY & ADDITIONAL TESTS:  Results Reviewed:   Imaging Personally Reviewed:  Electrocardiogram Personally Reviewed:    COORDINATION OF CARE:  Care Discussed with Consultants/Other Providers [Y/N]:  Prior or Outpatient Records Reviewed [Y/N]: Dalia Garvin, PGY-1  Internal Medicine  Pager: [phone], LI[facility] [phone]    PROGRESS NOTE:     Patient is a 89y old  Female who presents with a chief complaint of diarrhea, hypotension (23 May 2020 08:13)      SUBJECTIVE / OVERNIGHT EVENTS: Patient noted to be hypertensive to the 160s in the last 24 hours, added back home torsemide, will check lytes tomorrow. Patient plan for dispo per family discussion yesterday was d/c on Tuesday AM, will need COVID swab prior. Otherwise no acute events overnight, pt had 1 BM. Patient seen and examined this AM, no complaints, denied pain or discomfort.     ADDITIONAL REVIEW OF SYSTEMS: neg unless noted above    MEDICATIONS  (STANDING):  amLODIPine   Tablet 2.5 milliGRAM(s) Oral daily  ascorbic acid 500 milliGRAM(s) Oral daily  aspirin enteric coated 81 milliGRAM(s) Oral daily  atorvastatin 40 milliGRAM(s) Oral at bedtime  carvedilol 6.25 milliGRAM(s) Oral every 12 hours  fluticasone propionate 50 MICROgram(s)/spray Nasal Spray 1 Spray(s) Both Nostrils daily  gabapentin 300 milliGRAM(s) Oral daily  heparin   Injectable 5000 Unit(s) SubCutaneous every 12 hours  multivitamin 1 Tablet(s) Oral daily  torsemide 5 milliGRAM(s) Oral daily  vancomycin    Solution 125 milliGRAM(s) Oral every 6 hours    MEDICATIONS  (PRN):      CAPILLARY BLOOD GLUCOSE      POCT Blood Glucose.: 79 mg/dL (23 May 2020 08:31)    I&O's Summary    23 May 2020 07:01  -  24 May 2020 07:00  --------------------------------------------------------  IN: 690 mL / OUT: 0 mL / NET: 690 mL        PHYSICAL EXAM:  Vital Signs Last 24 Hrs  T(C): 36.3 (24 May 2020 06:14), Max: 36.6 (23 May 2020 23:20)  T(F): 97.3 (24 May 2020 06:14), Max: 97.9 (23 May 2020 23:20)  HR: 82 (24 May 2020 06:14) (72 - 84)  BP: 163/74 (24 May 2020 06:14) (158/70 - 168/77)  BP(mean): --  RR: 18 (24 May 2020 06:14) (18 - 18)  SpO2: 94% (24 May 2020 06:15) (90% - 98%)    CONSTITUTIONAL: NAD, frail female, resting on NC  RESPIRATORY: Normal respiratory effort; lungs are clear to auscultation bilaterally  CARDIOVASCULAR: trace lower extremity edema; Peripheral pulses are 2+ bilaterally  ABDOMEN: nontender to palpation, no rebound/guarding, active bowel sounds  PSYCH: A+O x3, affect appropriate    LABS:        RADIOLOGY & ADDITIONAL TESTS:  Results Reviewed:   Imaging Personally Reviewed:  Electrocardiogram Personally Reviewed:    COORDINATION OF CARE:  Care Discussed with Consultants/Other Providers [Y/N]:  Prior or Outpatient Records Reviewed [Y/N]:

## 2020-05-24 NOTE — PROGRESS NOTE ADULT - PROBLEM SELECTOR PLAN 4
Improving  - Creatinine 2.06 (from baseline .96), improving to 0.43-no longer checking daily labs  - Pre-renal etiology in setting of hypovolemia and infection   - TOV passed

## 2020-05-24 NOTE — PROGRESS NOTE ADULT - PROBLEM SELECTOR PLAN 3
Resolved  - UA positive, Urine culture positive for Enterobacter, resistant to CTX switched to Cefepime on 5/17, 3 day course completed    #Elevated ALP  -most likely from cephalosporin  -no symptoms

## 2020-05-25 NOTE — PROGRESS NOTE ADULT - PROBLEM SELECTOR PLAN 7
- Elevated troponin on admission likely in setting of demand ischemia, downtrending on repeat  -no need to trend further  - EKG with sinus arrythmia with anterior q waves - Elevated troponin on admission likely in setting of demand ischemia, downtrending on repeat  - EKG with sinus arrythmia with anterior q waves

## 2020-05-25 NOTE — PROGRESS NOTE ADULT - ATTENDING COMMENTS
Diarrhea resolved   c/w po vanco for C.diff to complete 10 days  s/p cefepime for enterobacter uti  c/w wound care for sacral ulcers.  PT reccs [facility]; discharge planning in progress.
Pending dc to [facility]  Repeat covid monday prior to discharge.
Pending dc to [facility]  Repeat covid monday prior to discharge
Pt seen. Still having diarrhea. c/w PO Vancomycin. Pt is also on Ceftriaxone for UTI ; would give a short course ( x 3 days). f/up urine cx sensitivities.
diarrhea resolved   c/w po vanco for cdiff  s/p cefepime for enterobacter uti  f/u PT eval for dispo
Pt seen. Still having loose stools. She denies fever/chills, n/v, abd pain. c/w PO Vancomycin. Pt was started on Ceftriaxone for UTI ; now switched to Cefepime as  Urine cx is growing Enterobacter resistant to Ceftriaxone. Blood cx growing coag negative staph in 1 bottle ; likely contaminant.
c/w po vanco for cdiff  s/p cefepime for enterobacter uti  f/u PT eval for dispo
Stable  Awaiting D/C to [facility] when bed available   Repeat COVID PCR
C/w po vanco for cdiff   DC cefepime for uti in am   F/u PT eval
Diarrhea resolved   c/w po vanco for C.diff to complete 10 days  s/p cefepime for enterobacter uti  c/w wound care for sacral ulcers.  PT reccs [facility]; discharge planning in progress.

## 2020-05-25 NOTE — PROGRESS NOTE ADULT - ASSESSMENT
89F w/ PMH htn, hld, spinal stenosis, MI, left hip replacement, recent sacral decub ulcers p/w failure to thrive and profuse diarrhea X 4 weeks admitted with sepsis 2/2 to c difficile and UTI, now s/p cefepime for UTI and on day 10/10 of PO vanc w/ improvement in BMs now pending dispo

## 2020-05-25 NOTE — PROGRESS NOTE ADULT - PROBLEM SELECTOR PLAN 1
Improving-1 formed BM in last 24 hours  - Patient presented with profuse diarrhea 7 X daily of watery stool in the setting of daily keflex  - C diff Positive, first episode of C. diff colitis  - Continue with Vancomycin po 125 mg q6 X 10 days, today is day 10/10  - Will hold home pantoprazole given active c difficile infection and home donepezil given side effects of diarrhea  - will d/c patient without daily keflex (spoke with patient's outpatient ID Dr. Ceron) - Patient presented with profuse diarrhea 7 X daily of watery stool in the setting of daily keflex  - s/p 10 days PO vanc  - C diff Positive, first episode of C. diff colitis  - Will hold home pantoprazole given active c difficile infection and home donepezil given side effects of diarrhea  - will d/c without daily keflex (spoke with patient's outpatient ID Dr. Ceron)

## 2020-05-25 NOTE — PROGRESS NOTE ADULT - PROBLEM SELECTOR PLAN 9
DVT PPX: heparin sub q   Diet: Regular w/ Gerhard and Suplena  Dispo: [facility]--will need COVID swab within 48 hours of discharge to [facility]  Code Status: DNR/DNI, no feeding tube, MOLST filled out DVT PPX: heparin sub q   Diet: Regular w/ Gerhard and Suplena  Dispo: [facility]--will need COVID today pending discharge tomorrow  Code Status: DNR/DNI, no feeding tube, MOLST filled out

## 2020-05-25 NOTE — PROGRESS NOTE ADULT - PROBLEM SELECTOR PLAN 4
Improving  - Creatinine 2.06 (from baseline .96), improving to 0.43-no longer checking daily labs  - Pre-renal etiology in setting of hypovolemia and infection   - TOV passed Resolved  - Creatinine peaked at 2.06 from baseline .96, improved to 0.43  - Pre-renal etiology in setting of hypovolemia and infection   - TOV passed

## 2020-05-26 NOTE — PROGRESS NOTE ADULT - REASON FOR ADMISSION
diarrhea, hypotension

## 2020-05-26 NOTE — PROGRESS NOTE ADULT - PROBLEM/PLAN-2
DISPLAY PLAN FREE TEXT
Clear

## 2020-05-26 NOTE — PROGRESS NOTE ADULT - PROBLEM SELECTOR PROBLEM 1
C. difficile diarrhea

## 2020-05-26 NOTE — PROGRESS NOTE ADULT - PROBLEM SELECTOR PROBLEM 4
Acute kidney injury

## 2020-05-26 NOTE — DISCHARGE NOTE NURSING/CASE MANAGEMENT/SOCIAL WORK - PATIENT PORTAL LINK FT
You can access the FollowMyHealth Patient Portal offered by [facility] by registering at the following website: [email or web address]. By joining [employer]’s FollowMyHealth portal, you will also be able to view your health information using other applications (apps) compatible with our system.

## 2020-05-26 NOTE — PROGRESS NOTE ADULT - PROBLEM SELECTOR PLAN 9
DVT PPX: heparin sub q   Diet: Regular w/ Gerhard and Suplena  Dispo: [facility]--will need COVID today pending discharge tomorrow  Code Status: DNR/DNI, no feeding tube, MOLST filled out DVT PPX: heparin sub q   Diet: Regular w/ Gerhard and Suplena  Dispo: [facility] today  Code Status: DNR/DNI, no feeding tube, MOLST filled out

## 2020-05-26 NOTE — DISCHARGE NOTE NURSING/CASE MANAGEMENT/SOCIAL WORK - NSDCFUADDAPPT_GEN_ALL_CORE_FT
Please schedule an appointment for follow-up at the [facility] located at [address] [place] [address]. You can call [phone] to make an appointment.    Please follow up with your primary care physician within 2 weeks of discharge from the hospital.

## 2020-05-26 NOTE — PROGRESS NOTE ADULT - PROBLEM SELECTOR PLAN 7
- Elevated troponin on admission likely in setting of demand ischemia, downtrending on repeat  - EKG with sinus arrythmia with anterior q waves

## 2020-05-26 NOTE — PROGRESS NOTE ADULT - PROBLEM SELECTOR PROBLEM 5
Pulmonary mass

## 2020-05-26 NOTE — PROGRESS NOTE ADULT - PROBLEM SELECTOR PLAN 8
-110/50-60 at admission, antihypertensives were initially held  -Patient HDS and hypertensive so Amlodipine 2.5 and coreg 6.25 BID added back on 5/20 w/ hold parameters  - patient persistently hypertensive to the 160s on 5/24 so home torsemide 5mg added back; will need to check lytes in AM -110/50-60 at admission, antihypertensives were initially held  -Patient now resumed on home HTN meds, BP stable

## 2020-05-26 NOTE — PROGRESS NOTE ADULT - PROBLEM SELECTOR PROBLEM 6
Failure to thrive in adult

## 2020-05-26 NOTE — PROGRESS NOTE ADULT - PROBLEM SELECTOR PLAN 1
- Patient presented with profuse diarrhea 7 X daily of watery stool in the setting of daily keflex  - s/p 10 days PO vanc  - C diff Positive, first episode of C. diff colitis  - Will hold home pantoprazole given active c difficile infection and home donepezil given side effects of diarrhea  - will d/c without daily keflex (spoke with patient's outpatient ID Dr. Ceron)

## 2020-05-26 NOTE — PROGRESS NOTE ADULT - SUBJECTIVE AND OBJECTIVE BOX
PROGRESS NOTE:   Authored by Senthil Laughlin MD, Pager [phone] [facility], [id number] [facility]     Patient is a 89y old  Female who presents with a chief complaint of diarrhea, hypotension (26 May 2020 06:42)      SUBJECTIVE / OVERNIGHT EVENTS: Patient seen and examined at bedside. No acute events overnight. Patient denies any abdominal pain this morning, no nausea or vomiting.    ADDITIONAL REVIEW OF SYSTEMS: Denies f/c/cp/sob.    MEDICATIONS  (STANDING):  amLODIPine   Tablet 2.5 milliGRAM(s) Oral daily  ascorbic acid 500 milliGRAM(s) Oral daily  aspirin enteric coated 81 milliGRAM(s) Oral daily  atorvastatin 40 milliGRAM(s) Oral at bedtime  carvedilol 6.25 milliGRAM(s) Oral every 12 hours  fluticasone propionate 50 MICROgram(s)/spray Nasal Spray 1 Spray(s) Both Nostrils daily  gabapentin 300 milliGRAM(s) Oral daily  heparin   Injectable 5000 Unit(s) SubCutaneous every 12 hours  multivitamin 1 Tablet(s) Oral daily  torsemide 5 milliGRAM(s) Oral daily    MEDICATIONS  (PRN):      CAPILLARY BLOOD GLUCOSE      POCT Blood Glucose.: 99 mg/dL (25 May 2020 09:15)    I&O's Summary    25 May 2020 07:01  -  26 May 2020 07:00  --------------------------------------------------------  IN: 200 mL / OUT: 350 mL / NET: -150 mL        PHYSICAL EXAM:  Vital Signs Last 24 Hrs  T(C): 36.4 (26 May 2020 04:51), Max: 36.5 (25 May 2020 20:34)  T(F): 97.5 (26 May 2020 04:51), Max: 97.7 (25 May 2020 20:34)  HR: 100 (25 May 2020 20:34) (70 - 100)  BP: 132/70 (26 May 2020 04:51) (125/75 - 146/60)  BP(mean): --  RR: 18 (26 May 2020 04:51) (18 - 19)  SpO2: 98% (26 May 2020 04:51) (97% - 98%)    GENERAL: NAD, comfortable  ENMT: Oropharynx clear without erythema, no exudates, moist mucous membranes  NECK: Supple  RESPIRATORY: Clear to auscultation bilaterally, good inspiratory effort and air movement, no crackles, wheezes, rales  CARDIOVASCULAR: Normal S1/S2, regular rate and rhythm, no murmurs noted  GASTROINTESTINAL: Abdomen is soft, nontender, nondistended, normoactive bowel sounds, no palpable masses  SKIN: No rashes, excoriations, purpura  MUSCULOSKELETAL: No clubbing or cyanosis, no obvious deformity  VASCULAR: Warm, well perfused, no lower extremity edema  NEUROLOGIC: Grossly normal  PSYCHIATRIC: AAOx2      LABS:                        10.0   11.09 )-----------( 243      ( 25 May 2020 06:04 )             34.4     05-25    140  |  103  |  13  ----------------------------<  56<L>  4.0   |  25  |  0.78    Ca    9.6      25 May 2020 06:03  Phos  2.7     05-25  Mg     1.8     05-25    TPro  5.8<L>  /  Alb  1.8<L>  /  TBili  0.3  /  DBili  x   /  AST  30  /  ALT  16  /  AlkPhos  138<H>  05-25                RADIOLOGY & ADDITIONAL TESTS:  Results Reviewed: Y  Imaging Personally Reviewed: Y    COORDINATION OF CARE:  Care Discussed with Consultants/Other Providers [Y/N]: Y  Prior or Outpatient Records Reviewed [Y/N]: Y

## 2020-05-26 NOTE — PROGRESS NOTE ADULT - PROBLEM SELECTOR PLAN 4
Resolved  - Creatinine peaked at 2.06 from baseline .96, improved to 0.43  - Pre-renal etiology in setting of hypovolemia and infection   - TOV passed Resolved  - Creatinine peaked at 2.06 from baseline .96, improved to (0.40 range)  - Pre-renal etiology in setting of hypovolemia and infection   - TOV passed

## 2020-06-06 NOTE — ED ADULT NURSE NOTE - OBJECTIVE STATEMENT
89y/o F coming from [facility] d/t hypotension. As per EMS, pt was noted to be 60 systolic @ nursing home and on arrival was 80s systolic. As per EMS, pt is C-diff positive being treated with vanco P.O. as well as pleural effusions being treated with IV cefepime. Pt was noted to have B/L infiltration in arms d/t nursing homes trying to put in a PICC line. Pt denies any CP/SOB/N/V/D. On exam, pt noted to have un-stageable sacral wound with tunneling & stage 4 wound noted on L hip. Abdomen soft, nontender, nondistended. Heart monitor placed, NSR. Pt breathing spontaneously & able to answer questions w/o difficulty, saturating 99% on 2L NC. IV placed. Labs collected and sent. Bed placed in lowest position with safety maintained.

## 2020-06-06 NOTE — ED PROVIDER NOTE - PHYSICAL EXAMINATION
Necrotic unstageable tunnelling sacral decubitus ulcer with purulent and necrotic tissue/drainage, L hip with deep tunneling necrotic pressure ulcer with purulent/necrotic drainage    Bilateral heels with pressure ulcers

## 2020-06-06 NOTE — H&P ADULT - PROBLEM SELECTOR PLAN 1
Patient DNR/DNI, comfort care only. MOLST completed and in pt's chart, confirmed with pt's daughter  - Dilaudid 0.5mg IV q2h PRN for Pain/Dyspnea  - Ativan 0.25mg IV q4h PRN for Anxiety/Agitation   - Robinul 0.4mg IV q6h PRN for Excessive Secretions  - Palliative recs appreciated

## 2020-06-06 NOTE — ED PROVIDER NOTE - PMH
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

## 2020-06-06 NOTE — ED PROVIDER NOTE - OBJECTIVE STATEMENT
89F pmhx HTN, HLD, spinal stenosis, MI, left hip replacement, recent sacral decub ulcers presents from nursing home with hypotension after getting PICC placed which infiltrated. Is being treated for CDiff with PO Vancomycin. Patient AOx1, arousable, but unable to provide history. Patient with DNR/DNI forms. As per EMS, no known fever, complaints.
held by/Clothing

## 2020-06-06 NOTE — H&P ADULT - NSICDXPASTMEDICALHX_GEN_ALL_CORE_FT
PAST MEDICAL HISTORY:  Atherosclerotic cardiovascular disease Stent 10 years ago    Hyperlipidemia     Hypertension     Spinal stenosis

## 2020-06-06 NOTE — H&P ADULT - ASSESSMENT
89 F with HTN, HLD, spinal stenosis, MI, left hip replacement, sacral decub ulcers presents from nursing home with hypotension, likely from septic shock in setting of C diff or decub ulcers. 89 F with HTN, HLD, spinal stenosis, MI, left hip replacement, sacral decub ulcers presents from nursing home with hypotension, due to septic shock,  2/2 C diff colitis, decub ulcers or UTI.

## 2020-06-06 NOTE — CONSULT NOTE ADULT - ASSESSMENT
89 F with HTN, HLD, spinal stenosis, MI, left hip replacement, sacral decub ulcers presents from nursing home with hypotension, likely from septic shock in setting of C diff or decub ulcers.    # Hypotension.  - s/p 3L IVF.  - Would bolus 500 cc 5% albumin.  - Titrate off levophed, for goal SBP >95 or MAP >55.  - Treat C diff with po vanc or IV flagyl.  - Patient is DNR/DNI per ED staff. Would clarify further GOC with family.    Patient is not a candidate for MICU level of care. 89 F with HTN, HLD, spinal stenosis, MI, left hip replacement, sacral decub ulcers presents from nursing home with hypotension, likely from septic shock in setting of C diff or decub ulcers.    # Hypotension.  - s/p 3L IVF.  - Would bolus 500 cc 5% albumin.  - Titrate off levophed, for goal SBP >95 or MAP >55.  - Treat C diff with po vanc and IV flagyl. Can administer vanc through NGT, if unable to swallow.  - Patient is DNR/DNI per ED staff. Would clarify further GOC with family.    Patient is not a candidate for MICU level of care.

## 2020-06-06 NOTE — H&P ADULT - NSHPSOCIALHISTORY_GEN_ALL_CORE
Recent hospital discharge, currently residing in rehab  Had been living with daughter prior to last admission Recent hospital discharge, currently residing in rehab  Had been living with daughter prior to last admission.  No alcohol or drug use. Smoked in college.

## 2020-06-06 NOTE — ED PROVIDER NOTE - ATTENDING CONTRIBUTION TO CARE
Attending MD Lyles.  Agree with above.  Pt is a 89 yo female who presents to ED from nursing home after she was to get a picc line yesterday which infiltrated, hypotense to 60's in nursing home today and transferred to ED because of hypotension.  Pt is known C. Diff positive.  Pt is DNR/DNI.

## 2020-06-06 NOTE — CONSULT NOTE ADULT - SUBJECTIVE AND OBJECTIVE BOX
CHIEF COMPLAINT: Hypotension    HPI: 89 F with HTN, HLD, spinal stenosis, MI, left hip replacement, sacral decub ulcers presents from nursing home with hypotension after attempt to place PICC. Patient AOx1, arousable, but unable to provide history. In ED, she was hypotensive to 40/20s, desatted to 76% on 2L NC, hypothermic. Patient was d/liz on , at which time she was treated for sepsis 2/2 C diff and UTI. She receive 10 days po vanc on last admission, with resolution of diarrhea. Family did not was work-up for pulmonary mass on last admission.    She was given cefepime, IV vanc, 3L IVF in the ED and started on levophed. Later became tachycardic to 110s on levophed.    FAMILY HISTORY:  No pertinent family history in first degree relatives      SOCIAL HISTORY:  Smoking: __ packs x ___ years  EtOH Use:  Marital Status:  Occupation:  Recent Travel:  Country of Birth:  Advance Directives:    Allergies    penicillin (Unknown)    Intolerances        HOME MEDICATIONS:    REVIEW OF SYSTEMS:  Constitutional:   Eyes:  ENT:  CV:  Resp:  GI:  :  MSK:  Integumentary:  Neurological:  Psychiatric:  Endocrine:  Hematologic/Lymphatic:  Allergic/Immunologic:  [ ] All other systems negative  [x ] Unable to assess ROS because mental status.    OBJECTIVE:  ICU Vital Signs Last 24 Hrs  T(C): 35.3 (2020 16:54), Max: 35.3 (2020 16:54)  T(F): 95.6 (2020 16:54), Max: 95.6 (2020 16:54)  HR: 114 (2020 20:30) (64 - 150)  BP: 85/56 (2020 20:30) (47/29 - 94/65)  BP(mean): 73 (2020 20:30) (36 - 73)  ABP: --  ABP(mean): --  RR: 19 (2020 20:30) (16 - 23)  SpO2: 100% (2020 20:30) (76% - 100%)        CAPILLARY BLOOD GLUCOSE      POCT Blood Glucose.: 196 mg/dL (2020 20:42)    PHYSICAL EXAM:  GENERAL: lethargic, but arousable, cachectic  HEAD:  Atraumatic, Normocephalic  EYES: EOMI, conjunctiva and sclera clear  NECK: Supple, No JVD  CHEST/LUNG: Clear to auscultation bilaterally; No wheeze, ronchi or rales, on NRB, no increased WOB  HEART: tachycardic, irregularly irregular, No murmurs, rubs, or gallops  ABDOMEN: Soft, Nontender, Nondistended; Bowel sounds present  EXTREMITIES:  2+ Peripheral Pulses, No clubbing, cyanosis, or edema  PSYCH: AAOx1  NEUROLOGY: non-focal  SKIN: erythematous and swollen upper extremities, likely for IVs.      HOSPITAL MEDICATIONS:  MEDICATIONS  (STANDING):  albumin human  5% IVPB 500 milliLiter(s) IV Intermittent Once  metroNIDAZOLE  IVPB 500 milliGRAM(s) IV Intermittent once  norepinephrine Infusion 2 MICROgram(s)/kG/Min (204 mL/Hr) IV Continuous <Continuous>  sodium chloride 0.9% Bolus 1000 milliLiter(s) IV Bolus once  sodium chloride 0.9% Bolus 1000 milliLiter(s) IV Bolus once  vancomycin  IVPB 750 milliGRAM(s) IV Intermittent once    MEDICATIONS  (PRN):      LABS:                        8.2    13.16 )-----------( 307      ( 2020 16:49 )             28.2     06-06    133<L>  |  103  |  47<H>  ----------------------------<  132<H>  3.7   |  20<L>  |  1.72<H>    Ca    8.4      2020 16:49    TPro  4.8<L>  /  Alb  1.4<L>  /  TBili  0.1<L>  /  DBili  x   /  AST  18  /  ALT  15  /  AlkPhos  112  06-06    PT/INR - ( 2020 16:49 )   PT: 12.4 sec;   INR: 1.08 ratio         PTT - ( 2020 16:49 )  PTT:33.0 sec  Urinalysis Basic - ( 2020 18:37 )    Color: Yellow / Appearance: Slightly Turbid / S.026 / pH: x  Gluc: x / Ketone: Trace  / Bili: Negative / Urobili: Negative   Blood: x / Protein: 30 mg/dL / Nitrite: Negative   Leuk Esterase: Large / RBC: 6 /hpf /  /HPF   Sq Epi: x / Non Sq Epi: 9 /hpf / Bacteria: Few            MICROBIOLOGY:     RADIOLOGY:  [ ] Reviewed and interpreted by me    EKG:

## 2020-06-06 NOTE — ED PROVIDER NOTE - UNABLE TO OBTAIN
Unresponsive Pt verbally responsive at time but disoriented, confused, hard of hearing, hx able to be obtained in HPI

## 2020-06-06 NOTE — ED ADULT NURSE REASSESSMENT NOTE - NS ED NURSE REASSESS COMMENT FT1
Family at the bedside with MD Lyles. Per family, fluids, levophed and atb to be discontinued. Will provide comfort measures and keep patient on cardiac monitor and pulse ox monitoring. Pt appears comfortable. Family at the bedside with MD Lyles. Per family, fluids, levophed and atb to be discontinued. Will provide comfort measures and keep patient on cardiac monitor and pulse ox monitoring. No s/s distress.

## 2020-06-06 NOTE — CONSULT NOTE ADULT - ATTENDING COMMENTS
89F NHR Recent DC 5/26 from [address] [place] [address] treated for C.Diff Pancolitis, Urosepsis, Presacral Decubital Wound Sepsis, Large Rt UL Lung Mass encroaching on RUL and Bronchus Intermedius p/w ED for Hypotension and Worsening Mental Status.   - Bed Bound and A&Ox1 with Metabolic Encephalopathy Grade 2-3 but DNR/DNI clarified by family   - Dehydrated and hypotensive need more aggressive IV fluid resuscitation   - Sepsis associated Hypotension on pressor to wean off with goal SBP 95mmHg or MAP 60 mmHg   - C. Diff Pancolitis/diarrhea and Urosepsis to continue treatment with empiric ABx   - SpO2 100% RaO2 with large RUL 7.5cm x 5cm encroached partially on RUL bronchus and Bronchus Intermedius (no further work-up)   - Family arrival awaiting [facility] Discussion     Patient seen and examined with ICU Resident/Fellow at bedside and lab data, medical records and radiology reports reviewed. I have read and agreeable with resident's Documentation, Assessment and Management Plans above in general which reflected my opinions from discussion. 89F NHR recently DC 5/26 from [address] [place] [address] being treated for C.Diff Pancolitis, Urosepsis, Presacral Decubital Wound Sepsis, Large Rt UL Lung Mass encroaching on RUL and Bronchus Intermedius p/w ED for Hypotension and Worsening Mental Status.   - Bed Bound and A&Ox1 with Metabolic Encephalopathy Grade 2-3 but DNR/DNI clarified by family   - Dehydrated and hypotensive need more aggressive IV fluid resuscitation   - Sepsis associated Hypotension on pressor to wean off with goal SBP 95mmHg or MAP 60 mmHg   - C. Diff Pancolitis/diarrhea and Urosepsis to continue treatment with empiric ABx   - SpO2 100% RaO2 with large RUL 7.5cm x 5cm encroached partially on RUL bronchus and Bronchus Intermedius (no further work-up)   - Family arrival awaiting [facility] Discussion     Patient seen and examined with ICU Resident/Fellow at bedside and lab data, medical records and radiology reports reviewed. I have read and agreeable with resident's Documentation, Assessment and Management Plans above in general which reflected my opinions from discussion. 89F NHR recently DC 5/26 from [address] [place] [address] being treated for C.Diff Pancolitis, Urosepsis, Presacral Decubital Wound Sepsis, Large Rt UL Lung Mass p/w ED for Hypotension and Worsening Mental Status.   - Bed Bound and A&Ox1 with Metabolic Encephalopathy Grade 2-3 but DNR/DNI clarified by family   - Dehydrated and hypotensive need more aggressive IV fluid resuscitation   - Sepsis associated Hypotension on pressor to wean off with goal SBP 95mmHg or MAP 60 mmHg   - C. Diff Pancolitis/diarrhea and Urosepsis to continue treatment with empiric ABx   - SpO2 100% on NRMO2   - Known large RUL 7.5cm x 5cm encroached partially on RUL Bronchus and Bronchus Intermedius (No further work-up)   - Family arrival awaiting [facility] Discussion     Patient seen and examined with ICU Resident/Fellow at bedside and lab data, medical records and radiology reports reviewed. I have read and agreeable with resident's Documentation, Assessment and Management Plans above in general which reflected my opinions from discussion.

## 2020-06-06 NOTE — H&P ADULT - NSHPPHYSICALEXAM_GEN_ALL_CORE
T(C): 35.3 (06-06-20 @ 16:54), Max: 35.3 (06-06-20 @ 16:54)  HR: 73 (06-06-20 @ 22:20) (64 - 150)  BP: 85/56 (06-06-20 @ 20:30) (47/29 - 94/65)  RR: 19 (06-06-20 @ 20:30) (16 - 23)  SpO2: 96% (06-06-20 @ 22:20) (76% - 100%)  General: comfortably lying in bed, NRB in place  Eyes: no conjunctival erythema  ENT: MMM  Neck: Neck supple   Respiratory: CTA B/L, No wheezing, rales, rhonchi  CV: RRR no murmurs  Abdominal: Soft, NT, ND +BS  MSK: no joint swelling  Extremities: cool extremities with pitting edema in hand and feet  Neurology: A&Ox0, grimaces to painful stimuli   Skin: No Rashes  Psych: unable to access T(C): 35.3 (06-06-20 @ 16:54), Max: 35.3 (06-06-20 @ 16:54)  HR: 73 (06-06-20 @ 22:20) (64 - 150)  BP: 85/56 (06-06-20 @ 20:30) (47/29 - 94/65)  RR: 19 (06-06-20 @ 20:30) (16 - 23)  SpO2: 96% (06-06-20 @ 22:20) (76% - 100%)  General: comfortably lying in bed, NRB in place  Eyes: no conjunctival erythema  ENT: MMM  Neck: Neck supple   Respiratory: CTA B/L, No wheezing, rales, rhonchi  CV: RRR no murmurs  Abdominal: Soft, NT, ND +BS  MSK: no joint swelling  Extremities: cool extremities with pitting edema in hand and feet  Neurology: A&Ox0, grimaces to painful stimuli   Skin: No Rashes  Psych: unable to access  ED Exam: Necrotic unstageable tunnelling sacral decubitus ulcer with purulent and necrotic tissue/drainage, L hip with deep tunneling necrotic pressure ulcer with purulent/necrotic drainage. Bilateral heels with pressure ulcers T(C): 35.3 (06-06-20 @ 16:54), Max: 35.3 (06-06-20 @ 16:54)  HR: 73 (06-06-20 @ 22:20) (64 - 150)  BP: 85/56 (06-06-20 @ 20:30) (47/29 - 94/65)  RR: 19 (06-06-20 @ 20:30) (16 - 23)  SpO2: 96% (06-06-20 @ 22:20) (76% - 100%)    General: comfortably lying in bed, NRB in place  Eyes: no conjunctival erythema, no scleral icterus  ENT: MMM, poor dentition  Neck: Neck supple, no palpable cervical lymphadenopathy, peripheral IV at L side of neck  Respiratory: CTA B/L anteriorly , No wheezing, rales, rhonchi  CV: RRR, no murmurs  Abdominal: Soft, NT, ND   MSK: no joint swelling  Extremities: cool extremities with pitting edema in hand and feet  Neurology: A&Ox0, grimaces to painful stimuli   Skin: some weeping at hands, ecchymosis at previous venipuncture sites  Per ED Exam: Necrotic unstageable tunnelling sacral decubitus ulcer with purulent and necrotic tissue/drainage, L hip with deep tunneling necrotic pressure ulcer with purulent/necrotic drainage. Bilateral heels with pressure ulcers  Psych: unable to access

## 2020-06-06 NOTE — ED ADULT TRIAGE NOTE - CHIEF COMPLAINT QUOTE
episode of hypotension today after attempts to place picc line x two. DNR/DNI. Currently treated for C.diff

## 2020-06-06 NOTE — H&P ADULT - HISTORY OF PRESENT ILLNESS
Ms. Ham is a 89y/o F w/ pmhx of HTN, HLD, spinal stenosis, MI, left hip replacement, recent sacral decub ulcers presents from nursing home with hypotension after getting PICC placed which infiltrated. Is being treated for CDiff with PO Vancomycin. In the ED patient  was AAOx1, arousable, but unable to provide history. She was noted to be hypotensive to 40/20s, desatted to 76% on 2L NC, hypothermic. Patient was d/liz on 5/26, at which time she was treated for sepsis 2/2 C diff and UTI. She received 10 days po vanc on last admission, with resolution of diarrhea. Family did not was work-up for pulmonary mass on last admission. She was given cefepime, IV vanc, 3L IVF in the ED and started on levophed. Later became tachycardic to 110s on levophed. Based on GOC discussion in the ED all interventions were discontinued and pt was made comfort care only.   Confirmed GOC with daughter (Manuela) confirms pt is DNR/DNI comfort care. Family unable to provide additional history as pt has been isolated in rehab since her recent discharge. Ms. Ham is a 89y/o F w/ pmhx of HTN, HLD, spinal stenosis, MI, left hip replacement, recent sacral decub ulcers presents from nursing home with hypotension after getting PICC placed which infiltrated. Is being treated for CDiff with PO Vancomycin. In the ED patient  was AAOx1, arousable, but unable to provide history. She was noted to be hypotensive to 40/20s, desatted to 76% on 2L NC, hypothermic. Patient was d/liz on 5/26, at which time she was treated for sepsis 2/2 C diff and UTI. She received 10 days po vanc on last admission, with resolution of diarrhea. Family did not pursue work-up for pulmonary mass on last admission. She was given cefepime, IV vanc, 3L IVF in the ED and started on levophed. Later became tachycardic to 110s on levophed. Based on GOC discussion in the ED all interventions were discontinued and pt was made comfort care only.   Confirmed GOC with daughter (Manuela) confirms pt is DNR/DNI comfort care. Family unable to provide additional history as pt has been isolated in rehab since her recent discharge.

## 2020-06-06 NOTE — ED PROVIDER NOTE - CARE PLAN
Pilar Paz, MS CCC-SLP 11/5/2018    " Principal Discharge DX:	Septic shock Principal Discharge DX:	Septic shock  Secondary Diagnosis:	Unstageable decubitus ulcer  Secondary Diagnosis:	UTI (urinary tract infection)  Secondary Diagnosis:	Clostridium difficile colitis  Secondary Diagnosis:	Lung mass

## 2020-06-06 NOTE — ED PROVIDER NOTE - PROGRESS NOTE DETAILS
Attending MD Lyles.  Pt noted to drop her BP from systolic 80's where she had been maintaining map >60 to 40's systolic.  Initially levophed begun for pressor 2/2 presumed septic shock however pt now in afib with RVR, will switch to neosynephrine.  She has received 1-2L saline.  30 cc/kg for this pt is 1632 cc which she has received.  Goal map 65.  Pt's daughter (Manuela De Luna) called and advised of concern for septic shock and high risk mortality.  Daughter confirms that pt is DNR/DNI but at this time is not prepared to make pt comfort measures.  Advised that likely source is decubitus wounds/UTI and possibly still C. Diff though pt receiving appropriate abxs for C. Diff and reportedly had had improvement with abxs with reduction in stool output.  Daughter and her  and sister are to come to ED to see pt for end of life visit.  MICU called for consult.  Will rediscuss goals of care at that time. Attending MD Lyles.  Pt's daughter Manuela De Luna and son in law are now present in room.  Prolonged discussion re: concern for futility of antibiotics with 30%+ bands and underlying CA with bilateral pleural effusions and 2 unstageable tunneling necrotic ulcers with prob UTI.  Daughter in agreement with comfort measures.  She is in agreement with low dose fentanyl pushes as needed.  Keep O2 by NRB/NC to reduce air hunger risk.  Discontinue all fluids, antibiotics, pressors, BP measurements.  Pt has peripheral IV's, no central line to be placed.  Daughter aware of expected demise within unclear length of time but anticipated during this admission.  MICU has seen pt but pt will be admitted to hospitalist for palliative consult and end of life management.  Dr. James Kent and myself were present for conversation.  All questions answered and daughter comfortable with plan of care.  Pt remains largely unresponsive without apparent discomfort or distress.  New MOLST completed and entered into chart.  DNR and Comfort measures orders placed.

## 2020-06-06 NOTE — H&P ADULT - PROBLEM SELECTOR PLAN 2
Likely 2/2 decub ulcers vs c diff. Received 3L bolus, Vanc/Cefepime and Levophen in ED. Cultures sent.   - based on GOC no further antibiotics, fluids, or blood draws Likely 2/2 decub ulcers vs c diff. Received 3L bolus, Vanc/Cefepime and Levophed in ED. Cultures sent.   - based on GOC no further antibiotics, fluids, or blood draws Likely 2/2 decub ulcers vs c diff v UTI. Received 3L bolus, Vanc/Cefepime and Levophed in ED. Cultures sent.   - based on GOC no further antibiotics, fluids, or blood draws Likely 2/2 decub ulcers vs c diff v UTI. Received 3L bolus, Vanc/Cefepime and Levophed in ED. Cultures sent.   - based on GOC no further antibiotics, fluids, or blood draws  - continue isolation precautions for suspected C diff colitis.

## 2020-06-06 NOTE — CHART NOTE - NSCHARTNOTEFT_GEN_A_CORE
Consult placed by ED for End of Life Care; initial recommendations below  Chart reviewed. Full Consult and Assessment to follow tomorrow.    ISTOP checked, Reference # [id number]  Rx Written	Rx Dispensed	Drug	Quantity	Days Supply	Prescriber Name	Payment Method	Dispenser  06/02/2020 06/02/2020 	oxycodone hcl 5 mg tablet 	30 	15 	Myles Younger MD 	[place] 	[facility]    HPI: 89yo F with PMH of HTN, Spinal Stenosis, h/o L ANILA, and sacral decubiti presenting from NH with hypotension. Patient recently discharged and being treated for C. diff infection. Patient has multiple potential sources of infections and is in septic shock. ED documented extensive conversation with family resulting in reaffirmation of DNR/DNI designation (MOLST originally completed on previous admission) and pursuing symptom-directed care without fluids, pressors, or Abx.    End of Life Care Recommendations for Medicine Floor management:  -recommend Dilaudid 0.2mg IV q2h PRN for Pain  -recommend Dilaudid 0.2mg IV q2h PRN for Dyspnea  -consider Ativan 0.25mg IV q4h PRN for Anxiety/Agitation  -consider Robinul 0.4mg IV q6h PRN for Excessive Secretions    Given goal of comfort, would provide medications as needed regardless of hemodynamics.      Will reassess regimen after full assessment.  Please page [phone] (NS) for any questions or further concerns. Consult placed by ED for End of Life Care; initial recommendations below  Chart reviewed. Full Consult and Assessment to follow tomorrow.    ISTOP checked, Reference # [id number]  Rx Written	Rx Dispensed	Drug	Quantity	Days Supply	Prescriber Name	Payment Method	Dispenser  06/02/2020 06/02/2020 	oxycodone hcl 5 mg tablet 	30 	15 	Myles Younger MD 	[place] 	[facility]    HPI: 89yo F with PMH of HTN, Spinal Stenosis, h/o L ANILA, and sacral decubiti presenting from NH with hypotension. Patient recently discharged and being treated for C. diff infection. Patient has multiple potential sources of infections and is in septic shock. ED documented extensive conversation with family resulting in reaffirmation of DNR/DNI designation (MOLST originally completed on previous admission) and pursuing symptom-directed care without fluids, pressors, or Abx.    End of Life Care Recommendations for Medicine Floor management:  -recommend Dilaudid 0.2mg IV q2h PRN for Pain -> if dose does not provide adequate relief, would escalate to 0.5mg  -recommend Dilaudid 0.2mg IV q2h PRN for Dyspnea -> if dose does not provide adequate relief, would escalate to 0.5mg  -consider Ativan 0.25mg IV q4h PRN for Anxiety/Agitation  -consider Robinul 0.4mg IV q6h PRN for Excessive Secretions    Given goal of comfort, would provide medications as needed regardless of hemodynamics.      Will reassess regimen after full assessment.  Please page [phone] (NS) for any questions or further concerns. Consult placed by ED for End of Life Care; initial recommendations below  Chart reviewed. Full Consult and Assessment to follow tomorrow.    ISTOP checked, Reference # [id number]  Rx Written	Rx Dispensed	Drug	Quantity	Days Supply	Prescriber Name	Payment Method	Dispenser  06/02/2020 06/02/2020 	oxycodone hcl 5 mg tablet 	30 	15 	Myles Younger MD 	[place] 	[facility]    HPI: 89yo F with PMH of HTN, Spinal Stenosis, h/o L ANILA, and sacral decubiti presenting from NH with hypotension. Patient recently discharged and being treated for C. diff infection. Patient has multiple potential sources of infections and is in septic shock. ED documented extensive conversation with family resulting in reaffirmation of DNR/DNI designation (MOLST originally completed on previous admission) and pursuing symptom-directed care without fluids, pressors, or Abx.    End of Life Care Recommendations for Medicine Floor management:  -recommend Dilaudid 0.5mg IV q2h PRN for Pain  -recommend Dilaudid 0.5mg IV q2h PRN for Dyspnea  -consider Ativan 0.25mg IV q4h PRN for Anxiety/Agitation -> if dose does not provide adequate relief, would escalate to 0.5mg  -consider Robinul 0.4mg IV q6h PRN for Excessive Secretions    Given goal of comfort, would provide medications as needed regardless of hemodynamics.      Will reassess regimen after full assessment.  Please page [phone] (NS) for any questions or further concerns. Consult placed by ED for End of Life Care; initial recommendations below  Chart reviewed. Full Consult and Assessment to follow tomorrow.    ISTOP checked, Reference # [id number]  Rx Written	Rx Dispensed	Drug	Quantity	Days Supply	Prescriber Name	Payment Method	Dispenser  06/02/2020 06/02/2020 	oxycodone hcl 5 mg tablet 	30 	15 	Myles Younger MD 	[place] 	[facility]    HPI: 89yo F with PMH of HTN, Spinal Stenosis, h/o L ANIAL, and sacral decubiti presenting from NH with hypotension. Patient recently discharged and being treated for C. diff infection. Patient has multiple potential sources of infections and is in septic shock. ED documented extensive conversation with family resulting in reaffirmation of DNR/DNI designation (MOLST originally completed on previous admission) and pursuing symptom-directed care without fluids, pressors, or Abx.    End of Life Care Recommendations for Medicine Floor management:  -recommend Dilaudid 0.5mg IV q2h PRN for Pain  -recommend Dilaudid 0.5mg IV q2h PRN for Dyspnea  -consider Ativan 0.25mg IV q4h PRN for Anxiety/Agitation -> if dose does not provide adequate relief, would escalate to 0.5mg  -consider Robinul 0.4mg IV q6h PRN for Excessive Secretions    Given goal of comfort, would provide medications when needed regardless of hemodynamics.      Will reassess regimen after full assessment.  Please page [phone] (NS) for any questions or further concerns.

## 2020-06-06 NOTE — H&P ADULT - ATTENDING COMMENTS
Patient assigned to me by night hospitalist in charge for management and care for patient for this evening only. Care to be resumed by day hospitalist in the morning and thereafter.   Venecia Travis MD p [phone]    On exam, the patient opens eyes to voice, but cannot follow commands, moves minimally. Lungs are CTAB anteriorly, cardiac exam with distant heart sounds, regular rhythm, abd soft without guarding, extremities are cool and upper extremities are edematous with some weeping. She is lying in bed, and appears to be breathing comfortably, without grimacing, moaning or other signs of distress at this time. RN has not been able to register a blood pressure with automatic cuff. The patient is DNR/DNI and has a MOLST form filled out expressing wishes. Continue contact isolation. Continue comfort measures. Patient assigned to me by night hospitalist in charge for management and care for patient for this evening only. Care to be resumed by day hospitalist in the morning and thereafter.   Venecia Travis MD p [phone]    This pt is a 89yo lady with PMH of htn, hLd, spinal stenosis, MI, L hip replacement, sacral decubitus ulcers who was recently hospitalized from 5/14-5/26/20 for UTI and  C diff infection, and pulmonary mass, who returns to the ED due to hypotension.  On exam, the patient opens eyes to voice, but cannot follow commands, moves minimally. Lungs are CTAB anteriorly, cardiac exam with distant heart sounds, regular rhythm, abd soft without guarding, extremities are cool and upper extremities are edematous with some weeping. She is lying in bed, and appears to be breathing comfortably, without grimacing, moaning or other signs of distress at this time. RN has not been able to register a blood pressure with automatic cuff. The patient is DNR/DNI and has a MOLST form filled out expressing wishes. Continue contact isolation. Continue comfort measures.

## 2020-06-06 NOTE — ED ADULT NURSE REASSESSMENT NOTE - NS ED NURSE REASSESS COMMENT FT1
Pt AAOx0. Non-verbal. VSS @ baseline. Left 20g AC IVL site patent. Upper extremities b/l swollen. Left extremity ecchymotic. No s/s distress. Pt covid swab done and sent to lab.

## 2020-06-06 NOTE — ED ADULT NURSE NOTE - NSIMPLEMENTINTERV_GEN_ALL_ED
Implemented All Fall with Harm Risk Interventions:  [place] to call system. Call bell, personal items and telephone within reach. Instruct patient to call for assistance. Room bathroom lighting operational. Non-slip footwear when patient is off stretcher. Physically safe environment: no spills, clutter or unnecessary equipment. Stretcher in lowest position, wheels locked, appropriate side rails in place. Provide visual cue, wrist band, yellow gown, etc. Monitor gait and stability. Monitor for mental status changes and reorient to person, place, and time. Review medications for side effects contributing to fall risk. Reinforce activity limits and safety measures with patient and family. Provide visual clues: red socks.

## 2020-06-06 NOTE — ED PROVIDER NOTE - MUSCULOSKELETAL MINIMAL EXAM
RUE with ecchymosis 2/2 infiltrate of RUE PICC attempt yesterday. RUE with ecchymosis 2/2 infiltrate of RUE PICC attempt yesterday, nvsc intact distal to site.

## 2020-06-06 NOTE — ED ADULT NURSE REASSESSMENT NOTE - NS ED NURSE REASSESS COMMENT FT1
Patient transported to CT scan. Straight catheterization performed with RN Rigor; 100 cc of clear yellow urine drained- sterile field maintained. Cefepime started. Blood pressure 80/30s- MD Chilel made aware.

## 2020-06-07 PROBLEM — I25.10 ATHEROSCLEROTIC HEART DISEASE OF NATIVE CORONARY ARTERY WITHOUT ANGINA PECTORIS: Chronic | Status: ACTIVE | Noted: 2018-11-02

## 2020-06-07 NOTE — CONSULT NOTE ADULT - PROBLEM SELECTOR RECOMMENDATION 2
Currently on NRB, will de-escalate to NC upon arrival to [facility].  -c/w Hydromorphone 0.5mg IV q2h PRN (will increase to q1h upon arrival to [facility])  -c/w Ativan 0.25mg q4h PRN (will increase to q1h upon arrival to [facility])

## 2020-06-07 NOTE — PROGRESS NOTE ADULT - ASSESSMENT
89 F with HTN, HLD, spinal stenosis, MI, left hip replacement, sacral decub ulcers presents from nursing home with hypotension, due to septic shock,  2/2 C diff colitis, decub ulcers or UTI.

## 2020-06-07 NOTE — DISCHARGE NOTE FOR THE EXPIRED PATIENT - HOSPITAL COURSE
89yo F with PMH of HTN, Spinal Stenosis, h/o L ANILA, and sacral decubiti presenting from NH with hypotension likely due to septic shock from multiple possible sources. Palliative consulted for EOL and symptom-directed care. Patient transferred to PCU on 2020 and . family notified. no autopsy being pursued.

## 2020-06-07 NOTE — CONSULT NOTE ADULT - PROBLEM SELECTOR RECOMMENDATION 9
Audible secretions on exam, received Robinul PRN x1 since admission.  -c/w Robinul 0.4mg IV q4h PRN  -avoid airway suctioning to prevent trauma

## 2020-06-07 NOTE — CONSULT NOTE ADULT - PROBLEM SELECTOR RECOMMENDATION 3
Multiple potential sources of infection. After discussion with family, no pressors/IVFs/Abx.  -c/w supportive care

## 2020-06-07 NOTE — PROVIDER CONTACT NOTE (OTHER) - ASSESSMENT
No response to external stimuli.  No spontaneous respirations.  No apical heart rate.  Negative pupillary response to light

## 2020-06-07 NOTE — CONSULT NOTE ADULT - ASSESSMENT
Plan for transfer to [facility] 89yo F with PMH of HTN, Spinal Stenosis, h/o L ANILA, and sacral decubiti presenting from NH with hypotension likely due to septic shock from multiple possible sources. Palliative consulted for EOL and symptom-directed care.

## 2020-06-07 NOTE — CONSULT NOTE ADULT - SUBJECTIVE AND OBJECTIVE BOX
[facility]  José Miguel Bernabe, Palliative Care Fellow  Questions/Concerns (including nights/weekends): Page [phone] ([facility]) or [phone] ([facility])      HPI: 89yo F with PMH of HTN, Spinal Stenosis, h/o L ANILA, and sacral decubiti presenting from NH with hypotension. Patient recently discharged and being treated for C. diff infection. Patient has multiple potential sources of infections and is in septic shock. ED documented extensive conversation with family resulting in reaffirmation of DNR/DNI designation (MOLST originally completed on previous admission) and pursuing symptom-directed care without fluids, pressors, or Abx.  Patient received Fentanyl 10mcg IV x1 and 25mcg IV x2 while in the ED. Patient seen and examined at bedside, unresponsive but without labored breathing. Noted to have audible secretions. Discussed plan of care with daughter over the phone, she is amenable to transfer to PCU.    PERTINENT PM/SXH:   Atherosclerotic cardiovascular disease  Spinal stenosis  Hyperlipidemia  Hypertension  No pertinent past medical history    History of left hip replacement  No significant past surgical history    FAMILY HISTORY:  No pertinent family history in first degree relatives    ITEMS NOT CHECKED ARE NOT PRESENT    SOCIAL HISTORY:   Significant other/partner:  []  Children:  [x]  Cheondoism/Spirituality:  Substance hx:  []   Tobacco hx:  []   Alcohol hx: []   Home Opioid hx:  [x] I-Stop Reference No: [id number]  Living Situation: []Home  []Long term care  [x]Rehab []Other    ADVANCE DIRECTIVES:    DNR  Yes  Yes   [x]MOLST  []Living Will  DECISION MAKER(s):  [x] Health Care Proxy(s)  [] Surrogate(s)  [] Guardian           Name(s): Manuela De Luna             Phone Number(s): [phone]    BASELINE (I)ADL(s) (prior to admission):  [place]: []Total  [] Moderate [x]Dependent    ALLERGIES:  Allergies    penicillin (Unknown)    Intolerances    MEDICATIONS  (STANDING):    MEDICATIONS  (PRN):  glycopyrrolate Injectable 0.4 milliGRAM(s) IV Push every 6 hours PRN excessive secretions  HYDROmorphone  Injectable 0.5 milliGRAM(s) IV Push every 2 hours PRN pain or dyspnea  LORazepam   Injectable 0.25 milliGRAM(s) IV Push every 4 hours PRN anxiety/agitation    PRESENT SYMPTOMS: []Unable to obtain due to poor mentation/encephalopathy  Source if other than patient:  []Family   []Team     Pain: [ ] yes [ ] no  QOL impact -   Location -                    Aggravating factors -  Quality -  Radiation -  Timing -  Severity (0-10 scale):  Minimal acceptable level (0-10 scale):    PAIN AD Score:  [email or web address] (press ctrl +  left click to view)    Dyspnea:                           []Mild  []Moderate []Severe  Anxiety:                             []Mild []Moderate []Severe  Fatigue:                             []Mild []Moderate []Severe  Nausea:                             []Mild []Moderate []Severe  Loss of appetite:              []Mild []Moderate []Severe  Constipation:                    []Mild []Moderate []Severe    Other Symptoms:  []All other review of systems negative     Palliative Performance Status Version 2:  %    [email or web address]_[email or web address]_[email or web address]    PHYSICAL EXAM:  Vital Signs Last 24 Hrs  T(C): 35.3 (2020 16:54), Max: 35.3 (2020 16:54)  T(F): 95.6 (2020 16:54), Max: 95.6 (2020 16:54)  HR: 73 (2020 22:20) (64 - 150)  BP: 85/56 (2020 20:30) (47/29 - 94/65)  BP(mean): 73 (2020 20:30) (36 - 73)  RR: 19 (2020 20:30) (16 - 23)  SpO2: 96% (2020 22:20) (76% - 100%) I&O's Summary  GENERAL:  []Alert  []Oriented x   []Lethargic  []Cachexia  []Unarousable  []Verbal  []Non-Verbal  Behavioral:   [] Anxiety  [] Delirium [] Agitation [] Other  HEENT:  []Normal   []Dry mouth   []ET Tube/Trach  []Oral lesions  PULMONARY:   []Clear []Tachypnea  []Audible excessive secretions   []Rhonchi        []Right []Left []Bilateral  []Crackles        []Right []Left []Bilateral  []Wheezing     []Right []Left []Bilateral  CARDIOVASCULAR:    []Regular []Irregular []Tachy  []Rayshawn []Murmur []Other  GASTROINTESTINAL:  []Soft  []Distended   []+BS  []Non tender []Tender  []PEG []OGT/ NGT  Last BM:   GENITOURINARY:  []Normal [] Incontinent   []Oliguria/Anuria   []Negro  MUSCULOSKELETAL:   []Normal   []Weakness  []Bed/Wheelchair bound []Edema  NEUROLOGIC:   []No focal deficits  [] Cognitive impairment  [] Dysphagia []Dysarthria [] Paresis []Other   SKIN:   []Normal   []Pressure ulcer(s)  []Rash    CRITICAL CARE:  [ ] Shock Present  [ ]Septic [ ]Cardiogenic [ ]Neurologic [ ]Hypovolemic  [ ]  Vasopressors [ ]  Inotropes   [ ] Respiratory failure present [ ] Mechanical Ventilation [ ] Non-invasive ventilatory support [ ] High-Flow  [ ] Acute  [ ] Chronic [ ] Hypoxic  [ ] Hypercarbic [ ] Other  [ ] Other organ failure    LABS:                        8.2    13.16 )-----------( 307      ( 2020 16:49 )             28.2   06-06    133<L>  |  103  |  47<H>  ----------------------------<  132<H>  3.7   |  20<L>  |  1.72<H>    Ca    8.4      2020 16:49    TPro  4.8<L>  /  Alb  1.4<L>  /  TBili  0.1<L>  /  DBili  x   /  AST  18  /  ALT  15  /  AlkPhos  112  06-06  PT/INR - ( 2020 16:49 )   PT: 12.4 sec;   INR: 1.08 ratio         PTT - ( 2020 16:49 )  PTT:33.0 sec    Urinalysis Basic - ( 2020 18:37 )    Color: Yellow / Appearance: Slightly Turbid / S.026 / pH: x  Gluc: x / Ketone: Trace  / Bili: Negative / Urobili: Negative   Blood: x / Protein: 30 mg/dL / Nitrite: Negative   Leuk Esterase: Large / RBC: 6 /hpf /  /HPF   Sq Epi: x / Non Sq Epi: 9 /hpf / Bacteria: Few      RADIOLOGY & ADDITIONAL STUDIES:    PROTEIN CALORIE MALNUTRITION PRESENT: [ ]mild [ ]moderate [ ]severe [ ]underweight [ ]morbid obesity  []PPSV2 < or = to 30% []significant weight loss  []poor nutritional intake []catabolic state []anasarca     Albumin, Serum: 1.4 g/dL (20 @ 16:49)  Artificial Nutrition []     REFERRALS:   []Chaplaincy  []Hospice  []Child Life  [x]Social Work  []Case management []Holistic Therapy     Goals of Care Document: BRONWYN Garvin (05-15-20 @ 14:14)  Goals of Care Conversation:   Participants:  · Participants  Family  · Child(stefan)  Manuela De Luna    Advance Directives:  · Does patient have Advance Directive  unk    Conversation Discussion:  · Conversation  Diagnosis; MOLST Discussed  · Conversation Details  Spoke at length with patient's daughter Manuela De Luna who has paperwork at home for Advanced Directive. She stated that per her mother's wishes, she would not like to have a feeding tube placed. Additionally, CPR and DNR was explained to Ms. De Luna who stated that given her mother's age and frailty that she would not want her to undergo a traumatic experience such as chest compressions. Similarly, she would not want her mother to be intubated given current suspicion of lung malignancy, age, and comorbidities. Patient's daughter agreed to make patient DNR/DNI. MOLST to be filled out and placed in patient chart.    Personal Advance Directives Treatment Guidelines:   Treatment Guidelines:  · Decision Maker  Surrogate  · Treatment Guidelines  DNR Order    MOLST:  · Completed  15-May-2020    Duration of Advanced Care Planning Meeting:  · Time spent (in minutes)  15      Electronic Signatures:  Dalia Garvin)  (Signed 15-May-2020 14:17)  	Authored: Goals of Care Conversation, Personal Advance Directives Treatment Guidelines      Last Updated: 15-May-2020 14:17 by Dalia Garvin) [facility]  José Miguel Bernabe, Palliative Care Fellow  Questions/Concerns (including nights/weekends): Page [phone] ([facility]) or [phone] ([facility])      HPI: 89yo F with PMH of HTN, Spinal Stenosis, h/o L ANILA, and sacral decubiti presenting from NH with hypotension. Patient recently discharged and being treated for C. diff infection. Patient has multiple potential sources of infections and is in septic shock. ED documented extensive conversation with family resulting in reaffirmation of DNR/DNI designation (MOLST originally completed on previous admission) and pursuing symptom-directed care without fluids, pressors, or Abx. Patient received Fentanyl 10mcg IV x1 and 25mcg IV x2 while in the ED. Patient seen and examined at bedside, unresponsive but without labored breathing. Noted to have audible secretions. Discussed plan of care with daughter over the phone, she is amenable to transfer to PCU.    PERTINENT PM/SXH:   Atherosclerotic cardiovascular disease  Spinal stenosis  Hyperlipidemia  Hypertension  No pertinent past medical history    History of left hip replacement  No significant past surgical history    FAMILY HISTORY:  No pertinent family history in first degree relatives    ITEMS NOT CHECKED ARE NOT PRESENT    SOCIAL HISTORY:   Significant other/partner:  []  Children:  [x]  Mormon/Spirituality:  Substance hx:  []   Tobacco hx:  []   Alcohol hx: []   Home Opioid hx:  [x] I-Stop Reference No: [id number]  Living Situation: []Home  []Long term care  [x]Rehab []Other    ADVANCE DIRECTIVES:    DNR  Yes  Yes   [x]MOLST  []Living Will  DECISION MAKER(s):  [x] Health Care Proxy(s)  [] Surrogate(s)  [] Guardian           Name(s): Manuela De Luna             Phone Number(s): [phone]    BASELINE (I)ADL(s) (prior to admission):  [place]: []Total  [] Moderate [x]Dependent    ALLERGIES:  penicillin (Unknown)    MEDICATIONS  (STANDING):    MEDICATIONS  (PRN):  glycopyrrolate Injectable 0.4 milliGRAM(s) IV Push every 6 hours PRN excessive secretions  HYDROmorphone  Injectable 0.5 milliGRAM(s) IV Push every 2 hours PRN pain or dyspnea  LORazepam   Injectable 0.25 milliGRAM(s) IV Push every 4 hours PRN anxiety/agitation    PRESENT SYMPTOMS: [x]Unable to obtain due to poor mentation/encephalopathy  Source if other than patient:  []Family   []Team     Pain: [ ] yes [x] no  QOL impact -   Location -                    Aggravating factors -  Quality -  Radiation -  Timing -  Severity (0-10 scale):  Minimal acceptable level (0-10 scale):    PAIN AD Score: 0  [email or web address]/painad.pdf (press ctrl +  left click to view)    Dyspnea:                           [x]Mild  []Moderate []Severe  Anxiety:                             []Mild []Moderate []Severe  Fatigue:                             []Mild []Moderate []Severe  Nausea:                             []Mild []Moderate []Severe  Loss of appetite:              []Mild []Moderate []Severe  Constipation:                    []Mild []Moderate []Severe    Other Symptoms:  [x]All other review of systems negative     Palliative Performance Status Version 2:  10%    [email or web address]_[email or web address]_[email or web address]    PHYSICAL EXAM:  Vital Signs Last 24 Hrs  T(C): 35.3 (2020 16:54), Max: 35.3 (2020 16:54)  T(F): 95.6 (2020 16:54), Max: 95.6 (2020 16:54)  HR: 73 (2020 22:20) (64 - 150)  BP: 85/56 (2020 20:30) (47/29 - 94/65)  BP(mean): 73 (2020 20:30) (36 - 73)  RR: 19 (2020 20:30) (16 - 23)  SpO2: 96% (2020 22:20) (76% - 100%) I&O's Summary  GENERAL:  []Alert  []Oriented x   []Lethargic  []Cachexia  [x]Unarousable  []Verbal  [x]Non-Verbal  Behavioral:   [] Anxiety  [x] Delirium [] Agitation [] Other  HEENT:  []Normal   [x]Dry mouth   []ET Tube/Trach  []Oral lesions  PULMONARY:   []Clear [x]Tachypnea  [x]Audible excessive secretions   []Rhonchi        []Right []Left []Bilateral  [x]Crackles        []Right []Left [x]Bilateral  []Wheezing     []Right []Left []Bilateral  CARDIOVASCULAR:    [x]Regular []Irregular []Tachy  []Rayshawn []Murmur []Other  GASTROINTESTINAL:  [x]Soft  []Distended   [x]+BS  []Non tender []Tender  []PEG []OGT/ NGT  Last BM: ?  GENITOURINARY:  []Normal [] Incontinent   [x]Oliguria/Anuria   []Negro  MUSCULOSKELETAL:   []Normal   []Weakness  [x]Bed/Wheelchair bound []Edema  NEUROLOGIC:   []No focal deficits  [x] Cognitive impairment  [] Dysphagia []Dysarthria [] Paresis []Other   SKIN:   []Normal   [x]Pressure ulcer(s)  []Rash    CRITICAL CARE:  [x] Shock Present  [x]Septic [ ]Cardiogenic [ ]Neurologic [ ]Hypovolemic  [ ]  Vasopressors [ ]  Inotropes   [ ] Respiratory failure present [ ] Mechanical Ventilation [ ] Non-invasive ventilatory support [ ] High-Flow  [ ] Acute  [ ] Chronic [ ] Hypoxic  [ ] Hypercarbic [ ] Other  [x] Other organ failure - Renal    LABS:                        8.2    13.16 )-----------( 307      ( 2020 16:49 )             28.2    133<L>  |  103  |  47<H>  ----------------------------<  132<H>  3.7   |  20<L>  |  1.72<H>    Ca    8.4      2020 16:49    TPro  4.8<L>  /  Alb  1.4<L>  /  TBili  0.1<L>  /  DBili  x   /  AST  18  /  ALT  15  /  AlkPhos  112  06-06  PT/INR - ( 2020 16:49 )   PT: 12.4 sec;   INR: 1.08 ratio    PTT - ( 2020 16:49 )  PTT:33.0 sec    Urinalysis Basic - ( 2020 18:37 )  Color: Yellow / Appearance: Slightly Turbid / S.026 / pH: x  Gluc: x / Ketone: Trace  / Bili: Negative / Urobili: Negative   Blood: x / Protein: 30 mg/dL / Nitrite: Negative   Leuk Esterase: Large / RBC: 6 /hpf /  /HPF   Sq Epi: x / Non Sq Epi: 9 /hpf / Bacteria: Few      RADIOLOGY & ADDITIONAL STUDIES:  < from: CT Abdomen and Pelvis No Cont (20 @ 18:47) >  Sacral decubitus ulcer better evaluated with direct visualization. No discrete focal drainable collection. No obvious bony destruction involving the sacrum. If there is clinical concern for osteomyelitis, nuclear bone scan or MRI are more sensitive imaging modalities.  Mild improvement of pancolitis of infectious or inflammatory etiology. Consider pseudomembranous colitis.  Redemonstration of a 7.4 x 4.9 cm right upper lobe mass that causes complete atelectasis of the right lower lobe of the lung. Bilateral pleural effusions.    PROTEIN CALORIE MALNUTRITION PRESENT: [ ]mild [ ]moderate [x]severe [ ]underweight [ ]morbid obesity  [x]PPSV2 < or = to 30% []significant weight loss  [x]poor nutritional intake []catabolic state []anasarca     Albumin, Serum: 1.4 g/dL (20 @ 16:49)  Artificial Nutrition []     REFERRALS:   []Chaplaincy  []Hospice  []Child Life  [x]Social Work  []Case management []Holistic Therapy     Goals of Care Document: BRONYWN Garvin (05-15-20 @ 14:14)  Diagnosis; MOLST Discussed  · Conversation Details  Spoke at length with patient's daughter Manuela De Luna who has paperwork at home for Advanced Directive. She stated that per her mother's wishes, she would not like to have a feeding tube placed. Additionally, CPR and DNR was explained to Ms. De Luna who stated that given her mother's age and frailty that she would not want her to undergo a traumatic experience such as chest compressions. Similarly, she would not want her mother to be intubated given current suspicion of lung malignancy, age, and comorbidities. Patient's daughter agreed to make patient DNR/DNI. MOLST to be filled out and placed in patient chart.    Personal Advance Directives Treatment Guidelines:   Treatment Guidelines:  · Decision Maker  Surrogate  · Treatment Guidelines  DNR Order    MOLST:  · Completed  15-May-2020    Last Updated: 15-May-2020 14:17 by Dalia Garvin)

## 2020-06-07 NOTE — CONSULT NOTE ADULT - ATTENDING COMMENTS
Patient seen and examined and agree with the above documentation with the following additions:   Palliative consulted in setting of end of life. In brief, patient admitted likely with septic shock; family goal at this time is symptom focused care.  Agree with diludid PRN for dyspnea, pain. Goal RR <26. Continue robinul PRN for secretions and ativan prn for anxiety/agitation.  Can transfer to PCU for ongoing care; discussed with daughter by Fellow.

## 2020-06-07 NOTE — PROGRESS NOTE ADULT - SUBJECTIVE AND OBJECTIVE BOX
[facility] Division of Hospital Medicine  Griselda Romero MD  Pager (M-F, 8A-5P): [phone]  Other Times:  [phone]    Patient is a 89y old  Female who presents with a chief complaint of Hypotension (2020 09:59)      SUBJECTIVE / OVERNIGHT EVENTS:    no overnight events. pt appears comfortable.   actively dying.    ADDITIONAL REVIEW OF SYSTEMS:    MEDICATIONS  (STANDING):    MEDICATIONS  (PRN):  glycopyrrolate Injectable 0.4 milliGRAM(s) IV Push every 6 hours PRN excessive secretions  HYDROmorphone  Injectable 0.5 milliGRAM(s) IV Push every 2 hours PRN pain or dyspnea  LORazepam   Injectable 0.25 milliGRAM(s) IV Push every 4 hours PRN anxiety/agitation      CAPILLARY BLOOD GLUCOSE      POCT Blood Glucose.: 196 mg/dL (2020 20:42)    I&O's Summary      PHYSICAL EXAM:  Vital Signs Last 24 Hrs  T(C): 35.3 (2020 16:54), Max: 35.3 (2020 16:54)  T(F): 95.6 (2020 16:54), Max: 95.6 (2020 16:54)  HR: 73 (2020 22:20) (64 - 150)  BP: 85/56 (2020 20:30) (47/29 - 94/65)  BP(mean): 73 (2020 20:30) (36 - 73)  RR: 19 (2020 20:30) (16 - 23)  SpO2: 96% (2020 22:20) (76% - 100%)  CONSTITUTIONAL: agonal breathing. no signs of distess      LABS:                        8.2    13.16 )-----------( 307      ( 2020 16:49 )             28.2     06-    133<L>  |  103  |  47<H>  ----------------------------<  132<H>  3.7   |  20<L>  |  1.72<H>    Ca    8.4      2020 16:49    TPro  4.8<L>  /  Alb  1.4<L>  /  TBili  0.1<L>  /  DBili  x   /  AST  18  /  ALT  15  /  AlkPhos  112  06-06    PT/INR - ( 2020 16:49 )   PT: 12.4 sec;   INR: 1.08 ratio         PTT - ( 2020 16:49 )  PTT:33.0 sec      Urinalysis Basic - ( 2020 18:37 )    Color: Yellow / Appearance: Slightly Turbid / S.026 / pH: x  Gluc: x / Ketone: Trace  / Bili: Negative / Urobili: Negative   Blood: x / Protein: 30 mg/dL / Nitrite: Negative   Leuk Esterase: Large / RBC: 6 /hpf /  /HPF   Sq Epi: x / Non Sq Epi: 9 /hpf / Bacteria: Few

## 2020-06-07 NOTE — PROGRESS NOTE ADULT - PROBLEM SELECTOR PLAN 2
Likely 2/2 decub ulcers vs c diff v UTI. Received 3L bolus, Vanc/Cefepime and Levophed in ED. Cultures sent.   - based on GOC no further antibiotics, fluids, or blood draws  - continue isolation precautions for suspected C diff colitis.

## 2020-12-23 PROBLEM — J06.9 ACUTE URI: Status: RESOLVED | Noted: 2020-01-01 | Resolved: 2020-12-23

## 2021-05-14 NOTE — PROGRESS NOTE ADULT - PROBLEM SELECTOR PROBLEM 8
Problem: Mobility - Impaired:  Goal: Mobility will improve  Description: Mobility will improve  5/14/2021 1623 by Moises Garcia RN  Outcome: Ongoing  5/14/2021 0942 by Moises Garcia RN  Outcome: Ongoing     Problem: Skin Integrity:  Goal: Will show no infection signs and symptoms  Description: Will show no infection signs and symptoms  5/14/2021 1623 by Moises Garcia RN  Outcome: Ongoing  5/14/2021 0942 by Moises Garcia RN  Outcome: Ongoing  Goal: Absence of new skin breakdown  Description: Absence of new skin breakdown  5/14/2021 1623 by Moises Garcia RN  Outcome: Ongoing  5/14/2021 0942 by Moises Garcia RN  Outcome: Ongoing     Problem: Falls - Risk of:  Goal: Will remain free from falls  Description: Will remain free from falls  Outcome: Ongoing     Problem: Pain:  Goal: Pain level will decrease  Description: Pain level will decrease  Outcome: Ongoing  Goal: Control of acute pain  Description: Control of acute pain  Outcome: Ongoing  Goal: Control of chronic pain  Description: Control of chronic pain  Outcome: Ongoing     Problem: Nutrition  Goal: Optimal nutrition therapy  Outcome: Ongoing     Problem: Cardiac:  Goal: Cardiovascular alteration will improve  Description: Cardiovascular alteration will improve  Outcome: Ongoing Need for prophylactic measure

## 2021-11-10 NOTE — PROGRESS NOTE ADULT - PROBLEM SELECTOR PLAN 7
Previously Declined (within the last year) - Elevated troponin likely in setting of demand ischemia, downtrending on repeat  -no need to trend further  - EKG with sinus arrythmia with anterior q waves will compare with prior EKG, continue to monitor

## 2022-02-26 NOTE — PROGRESS NOTE ADULT - PROBLEM SELECTOR PLAN 4
Yes... - unclear etiol  - check vitd, pth, pthrp for now  - s/p 2.5L ns  - will pursue further workup

## 2023-03-22 NOTE — HISTORY OF PRESENT ILLNESS
[FreeTextEntry1] : 89 y/o female comes for f/u visit.\par \par She was diagnosed with GBS left hip PJI, s/p IR drain placement. Got 6 weeks IV ceftriaxone and on PO Keflex currently.\par \par No major issues. Had drain removed by IR on 1/4. \par \par No diarrhea, nausea, vomiting, abd pain.  No rash, yeast infection. \par \par No urinary issues. \par \par She is getting wound care for her feet for decubitus sores. It is improving.\par \par Daughters at bedside helped with hx.  Within functional limits

## 2023-10-20 NOTE — PROGRESS NOTE ADULT - MS EXT PE MLT D E PC
What Type Of Note Output Would You Prefer (Optional)?: Standard Output
How Severe Is Your Acne?: mild
Is This A New Presentation, Or A Follow-Up?: Acne
no cyanosis

## 2024-02-02 NOTE — PROGRESS NOTE ADULT - SUBJECTIVE AND OBJECTIVE BOX
Fax: [phone]    [id number] - [place], [place] - [address] - P [phone] - F [phone]  [address]  [facility] [address]  Phone: [phone] Fax: [phone]    [id number] [place] - [address] - P [phone] - F [phone]  [address]  [facility] [address]  Phone: [phone] Fax: [phone]      Assistance purchasing medications?: Potential Assistance Purchasing Medications: No  Assistance provided by Case Management: None at this time    Does patient want to participate in local refill/ meds to beds program?: Not Assessed    Meds To Beds General Rules:  1. Can ONLY be done Monday- Friday between 8:30am-5pm  2. Prescription(s) must be in pharmacy by 3pm to be filled same day  3.Copy of patient's insurance/ prescription drug card and patient face sheet must be sent along with the prescription(s)  4. Cost of Rx cannot be added to hospital bill. If financial assistance is needed, please contact unit  or ;  or  CANNOT provide pharmacy voucher for patients co-pays  5. Patients can then  the prescription on their way out of the hospital at discharge, or pharmacy can deliver to the bedside if staff is available. (payment due at time of pick-up or delivery - cash, check, or card accepted)     Able to afford home medications/ co-pay costs: Yes    ADLS:  Current PT AM-PAC Score: 7 /24  Current OT AM-PAC Score: 14 /24    Pt to dc to:  [facility]    Services Available   Skilled Nursing      Address   [address] [place] [address]             Contact Information    [phone]          LOC at discharge: Skilled  MORGAN Completed: Yes    Notification completed in HENS/PAS?:  Yes : [facility] has completed HENS online through secure website for SNF admission at 2/2/24.   Document ID #: [id number]    IMM Completed:   Yes, Case management has presented and reviewed IMM  Yes    Freedom of choice list was provided with basic dialogue that supports the patient's individualized plan of care/goals and shares the quality data associated with the providers. Yes    Care Transitions patient: No    GHISLAINE Ríos  [facility]  Case Management Department  Ph: [phone]  Fax: [phone]    PROGRESS NOTE:   Authored by Senthil Laughlin MD, Pager [phone] [facility], [address] [facility]     Patient is a 89y old  Female who presents with a chief complaint of diarrhea, hypotension (25 May 2020 06:50)      SUBJECTIVE / OVERNIGHT EVENTS: Patient seen and examined at bedside. Patient had one soft bm overnight. Patient denies any abdominal pain. Denies fevers or chills.    ADDITIONAL REVIEW OF SYSTEMS: No nausea or vomiting.    MEDICATIONS  (STANDING):  amLODIPine   Tablet 2.5 milliGRAM(s) Oral daily  ascorbic acid 500 milliGRAM(s) Oral daily  aspirin enteric coated 81 milliGRAM(s) Oral daily  atorvastatin 40 milliGRAM(s) Oral at bedtime  carvedilol 6.25 milliGRAM(s) Oral every 12 hours  fluticasone propionate 50 MICROgram(s)/spray Nasal Spray 1 Spray(s) Both Nostrils daily  gabapentin 300 milliGRAM(s) Oral daily  heparin   Injectable 5000 Unit(s) SubCutaneous every 12 hours  multivitamin 1 Tablet(s) Oral daily  torsemide 5 milliGRAM(s) Oral daily    MEDICATIONS  (PRN):      CAPILLARY BLOOD GLUCOSE      POCT Blood Glucose.: 70 mg/dL (25 May 2020 07:52)    I&O's Summary    24 May 2020 07:01  -  25 May 2020 07:00  --------------------------------------------------------  IN: 1070 mL / OUT: 1000 mL / NET: 70 mL        PHYSICAL EXAM:  Vital Signs Last 24 Hrs  T(C): 36.2 (25 May 2020 05:57), Max: 36.5 (24 May 2020 22:18)  T(F): 97.2 (25 May 2020 05:57), Max: 97.7 (24 May 2020 22:18)  HR: 65 (25 May 2020 05:57) (61 - 80)  BP: 154/71 (25 May 2020 05:57) (149/70 - 168/63)  BP(mean): --  RR: 18 (25 May 2020 05:57) (18 - 18)  SpO2: 95% (25 May 2020 05:57) (91% - 98%)    GENERAL: NAD, comfortable  ENMT: Oropharynx clear without erythema, no exudates, moist mucous membranes  NECK: Supple  RESPIRATORY: Clear to auscultation bilaterally, good inspiratory effort and air movement, no crackles, wheezes, rales  CARDIOVASCULAR: Normal S1/S2, regular rate and rhythm, no murmurs noted  GASTROINTESTINAL: Abdomen is soft, nontender, nondistended, normoactive bowel sounds, no palpable masses  SKIN: No rashes, excoriations, purpura  MUSCULOSKELETAL: No clubbing or cyanosis, no obvious deformity  VASCULAR: Warm, well perfused, no lower extremity edema  NEUROLOGIC: Grossly normal  PSYCHIATRIC: AAOx2 (knows she is in hospital, not which one)    LABS:                        10.0   11.09 )-----------( 243      ( 25 May 2020 06:04 )             34.4     05-25    140  |  103  |  13  ----------------------------<  56<L>  4.0   |  25  |  0.78    Ca    9.6      25 May 2020 06:03  Phos  2.7     05-25  Mg     1.8     05-25    TPro  5.8<L>  /  Alb  1.8<L>  /  TBili  0.3  /  DBili  x   /  AST  30  /  ALT  16  /  AlkPhos  138<H>  05-25                RADIOLOGY & ADDITIONAL TESTS:  Results Reviewed:   Imaging Personally Reviewed:    COORDINATION OF CARE:  Care Discussed with Consultants/Other Providers [Y/N]:  Prior or Outpatient Records Reviewed [Y/N]:

## 2024-07-25 NOTE — CONSULT NOTE ADULT - PROBLEM SELECTOR RECOMMENDATION 6
End of Life care in the setting of Septic Shock, multiple potential sources. Family requesting symptom-directed care, no aggressive/invasive measures to prolong suffering. Reiterated plan of care with HCP and she is amenable. Patient to be transferred to PCU.
none